# Patient Record
Sex: MALE | Race: OTHER | URBAN - METROPOLITAN AREA
[De-identification: names, ages, dates, MRNs, and addresses within clinical notes are randomized per-mention and may not be internally consistent; named-entity substitution may affect disease eponyms.]

---

## 2021-11-01 ENCOUNTER — OUTPATIENT (OUTPATIENT)
Dept: OUTPATIENT SERVICES | Facility: HOSPITAL | Age: 51
LOS: 1 days | Discharge: HOME | End: 2021-11-01

## 2021-11-01 DIAGNOSIS — Z11.59 ENCOUNTER FOR SCREENING FOR OTHER VIRAL DISEASES: ICD-10-CM

## 2024-11-15 PROBLEM — Z00.00 ENCOUNTER FOR PREVENTIVE HEALTH EXAMINATION: Status: ACTIVE | Noted: 2024-11-15

## 2024-12-18 ENCOUNTER — APPOINTMENT (OUTPATIENT)
Dept: CARDIOLOGY | Facility: CLINIC | Age: 54
End: 2024-12-18
Payer: COMMERCIAL

## 2024-12-18 ENCOUNTER — RESULT CHARGE (OUTPATIENT)
Age: 54
End: 2024-12-18

## 2024-12-18 VITALS
WEIGHT: 192 LBS | HEIGHT: 70 IN | HEART RATE: 84 BPM | SYSTOLIC BLOOD PRESSURE: 120 MMHG | DIASTOLIC BLOOD PRESSURE: 62 MMHG | BODY MASS INDEX: 27.49 KG/M2

## 2024-12-18 DIAGNOSIS — Z82.49 FAMILY HISTORY OF ISCHEMIC HEART DISEASE AND OTHER DISEASES OF THE CIRCULATORY SYSTEM: ICD-10-CM

## 2024-12-18 DIAGNOSIS — E78.1 PURE HYPERGLYCERIDEMIA: ICD-10-CM

## 2024-12-18 DIAGNOSIS — E11.9 TYPE 2 DIABETES MELLITUS W/OUT COMPLICATIONS: ICD-10-CM

## 2024-12-18 DIAGNOSIS — R06.00 DYSPNEA, UNSPECIFIED: ICD-10-CM

## 2024-12-18 DIAGNOSIS — Z86.39 PERSONAL HISTORY OF OTHER ENDOCRINE, NUTRITIONAL AND METABOLIC DISEASE: ICD-10-CM

## 2024-12-18 PROCEDURE — 93000 ELECTROCARDIOGRAM COMPLETE: CPT

## 2024-12-18 PROCEDURE — 99204 OFFICE O/P NEW MOD 45 MIN: CPT | Mod: 25

## 2024-12-18 RX ORDER — ICOSAPENT ETHYL 1 G/1
1 CAPSULE ORAL
Refills: 0 | Status: ACTIVE | COMMUNITY

## 2024-12-18 RX ORDER — UBIDECARENONE/VIT E ACET 100MG-5
CAPSULE ORAL
Refills: 0 | Status: ACTIVE | COMMUNITY

## 2024-12-18 RX ORDER — LOSARTAN POTASSIUM 25 MG/1
25 TABLET, FILM COATED ORAL DAILY
Refills: 0 | Status: ACTIVE | COMMUNITY

## 2024-12-18 RX ORDER — METFORMIN HYDROCHLORIDE 500 MG/1
500 TABLET, COATED ORAL DAILY
Refills: 0 | Status: ACTIVE | COMMUNITY

## 2024-12-18 RX ORDER — SEMAGLUTIDE 0.68 MG/ML
2 INJECTION, SOLUTION SUBCUTANEOUS
Refills: 0 | Status: ACTIVE | COMMUNITY

## 2024-12-18 RX ORDER — METOPROLOL TARTRATE 100 MG/1
100 TABLET ORAL
Qty: 2 | Refills: 0 | Status: ACTIVE | COMMUNITY
Start: 2024-12-18 | End: 1900-01-01

## 2024-12-18 RX ORDER — ATORVASTATIN CALCIUM 40 MG/1
40 TABLET, FILM COATED ORAL DAILY
Qty: 90 | Refills: 3 | Status: ACTIVE | COMMUNITY

## 2024-12-20 PROBLEM — E11.9 NON-INSULIN DEPENDENT TYPE 2 DIABETES MELLITUS: Status: ACTIVE | Noted: 2024-12-18

## 2024-12-20 PROBLEM — E78.1 HYPERTRIGLYCERIDEMIA: Status: ACTIVE | Noted: 2024-12-18

## 2024-12-20 PROBLEM — Z86.39 H/O MIXED HYPERLIPIDEMIA: Status: ACTIVE | Noted: 2024-12-18

## 2024-12-31 ENCOUNTER — APPOINTMENT (OUTPATIENT)
Dept: CARDIOLOGY | Facility: CLINIC | Age: 54
End: 2024-12-31
Payer: COMMERCIAL

## 2024-12-31 PROCEDURE — 93306 TTE W/DOPPLER COMPLETE: CPT

## 2025-01-15 ENCOUNTER — OUTPATIENT (OUTPATIENT)
Dept: OUTPATIENT SERVICES | Facility: HOSPITAL | Age: 55
LOS: 1 days | End: 2025-01-15
Payer: COMMERCIAL

## 2025-01-15 ENCOUNTER — RESULT REVIEW (OUTPATIENT)
Age: 55
End: 2025-01-15

## 2025-01-15 DIAGNOSIS — R06.00 DYSPNEA, UNSPECIFIED: ICD-10-CM

## 2025-01-15 DIAGNOSIS — Z00.8 ENCOUNTER FOR OTHER GENERAL EXAMINATION: ICD-10-CM

## 2025-01-15 PROCEDURE — 75574 CT ANGIO HRT W/3D IMAGE: CPT | Mod: 26

## 2025-01-15 PROCEDURE — 75574 CT ANGIO HRT W/3D IMAGE: CPT

## 2025-01-16 DIAGNOSIS — R06.00 DYSPNEA, UNSPECIFIED: ICD-10-CM

## 2025-01-17 ENCOUNTER — RESULT CHARGE (OUTPATIENT)
Age: 55
End: 2025-01-17

## 2025-01-17 ENCOUNTER — APPOINTMENT (OUTPATIENT)
Dept: CARDIOLOGY | Facility: CLINIC | Age: 55
End: 2025-01-17
Payer: COMMERCIAL

## 2025-01-17 ENCOUNTER — NON-APPOINTMENT (OUTPATIENT)
Age: 55
End: 2025-01-17

## 2025-01-17 VITALS
BODY MASS INDEX: 27.49 KG/M2 | DIASTOLIC BLOOD PRESSURE: 78 MMHG | HEART RATE: 99 BPM | WEIGHT: 192 LBS | HEIGHT: 70 IN | SYSTOLIC BLOOD PRESSURE: 114 MMHG

## 2025-01-17 VITALS — SYSTOLIC BLOOD PRESSURE: 114 MMHG | DIASTOLIC BLOOD PRESSURE: 78 MMHG

## 2025-01-17 DIAGNOSIS — I25.118 ATHEROSCLEROTIC HEART DISEASE OF NATIVE CORONARY ARTERY WITH OTHER FORMS OF ANGINA PECTORIS: ICD-10-CM

## 2025-01-17 DIAGNOSIS — R06.00 DYSPNEA, UNSPECIFIED: ICD-10-CM

## 2025-01-17 DIAGNOSIS — E11.9 TYPE 2 DIABETES MELLITUS W/OUT COMPLICATIONS: ICD-10-CM

## 2025-01-17 DIAGNOSIS — Z82.49 FAMILY HISTORY OF ISCHEMIC HEART DISEASE AND OTHER DISEASES OF THE CIRCULATORY SYSTEM: ICD-10-CM

## 2025-01-17 DIAGNOSIS — E78.1 PURE HYPERGLYCERIDEMIA: ICD-10-CM

## 2025-01-17 DIAGNOSIS — Z86.39 PERSONAL HISTORY OF OTHER ENDOCRINE, NUTRITIONAL AND METABOLIC DISEASE: ICD-10-CM

## 2025-01-17 LAB
ANION GAP SERPL CALC-SCNC: 14 MMOL/L
BUN SERPL-MCNC: 11 MG/DL
CALCIUM SERPL-MCNC: 10.2 MG/DL
CHLORIDE SERPL-SCNC: 98 MMOL/L
CHOLEST SERPL-MCNC: 265 MG/DL
CO2 SERPL-SCNC: 28 MMOL/L
CREAT SERPL-MCNC: 0.9 MG/DL
EGFR: 102 ML/MIN/1.73M2
ESTIMATED AVERAGE GLUCOSE: 157 MG/DL
GLUCOSE SERPL-MCNC: 135 MG/DL
HBA1C MFR BLD HPLC: 7.1 %
HCT VFR BLD CALC: 43.7 %
HDLC SERPL-MCNC: 35 MG/DL
HGB BLD-MCNC: 14.5 G/DL
INR PPP: 1.01 RATIO
LDLC SERPL CALC-MCNC: 182 MG/DL
MCHC RBC-ENTMCNC: 25.3 PG
MCHC RBC-ENTMCNC: 33.2 G/DL
MCV RBC AUTO: 76.3 FL
NONHDLC SERPL-MCNC: 230 MG/DL
PLATELET # BLD AUTO: 382 K/UL
PMV BLD AUTO: 0 /100 WBCS
PMV BLD: 8.7 FL
POTASSIUM SERPL-SCNC: 4.5 MMOL/L
PT BLD: 11.9 SEC
RBC # BLD: 5.73 M/UL
RBC # FLD: 14 %
SODIUM SERPL-SCNC: 140 MMOL/L
TRIGL SERPL-MCNC: 251 MG/DL
WBC # FLD AUTO: 6.98 K/UL

## 2025-01-17 PROCEDURE — 93000 ELECTROCARDIOGRAM COMPLETE: CPT

## 2025-01-17 PROCEDURE — 99215 OFFICE O/P EST HI 40 MIN: CPT | Mod: 25

## 2025-01-17 RX ORDER — ASPIRIN 81 MG/1
81 TABLET, COATED ORAL
Qty: 90 | Refills: 3 | Status: ACTIVE | COMMUNITY
Start: 2025-01-17 | End: 1900-01-01

## 2025-01-17 RX ORDER — METOPROLOL TARTRATE 25 MG/1
25 TABLET ORAL TWICE DAILY
Qty: 60 | Refills: 3 | Status: ACTIVE | COMMUNITY
Start: 2025-01-17 | End: 1900-01-01

## 2025-01-18 LAB
APO B SERPL-MCNC: 154 MG/DL
CRP SERPL HS-MCNC: 9.53 MG/L
NT-PROBNP SERPL-MCNC: <36 PG/ML

## 2025-01-20 LAB — APO LP(A) SERPL-MCNC: 58.8 NMOL/L

## 2025-01-21 ENCOUNTER — RESULT REVIEW (OUTPATIENT)
Age: 55
End: 2025-01-21

## 2025-01-21 ENCOUNTER — OUTPATIENT (OUTPATIENT)
Dept: OUTPATIENT SERVICES | Facility: HOSPITAL | Age: 55
LOS: 1 days | End: 2025-01-21
Payer: COMMERCIAL

## 2025-01-21 DIAGNOSIS — R93.1 ABNORMAL FINDINGS ON DIAGNOSTIC IMAGING OF HEART AND CORONARY CIRCULATION: ICD-10-CM

## 2025-01-21 PROCEDURE — 75580 N-INVAS EST C FFR SW ALY CTA: CPT

## 2025-01-21 PROCEDURE — 75580 N-INVAS EST C FFR SW ALY CTA: CPT | Mod: 26

## 2025-01-22 DIAGNOSIS — R93.1 ABNORMAL FINDINGS ON DIAGNOSTIC IMAGING OF HEART AND CORONARY CIRCULATION: ICD-10-CM

## 2025-02-04 VITALS
RESPIRATION RATE: 19 BRPM | DIASTOLIC BLOOD PRESSURE: 80 MMHG | OXYGEN SATURATION: 100 % | SYSTOLIC BLOOD PRESSURE: 119 MMHG | HEART RATE: 82 BPM

## 2025-02-04 NOTE — H&P CARDIOLOGY - HISTORY OF PRESENT ILLNESS
Patient is a 54y Male PMH of HLD, NIDDM, hypertriglyceridemia, + family h/o CAD. Pt reports shortness of breathanddyspnea during exertion, but no chest discomfort, no extremity edema, no intermittent leg claudication, no palpitations, no orthopnea, no PND and no syncope.  CCTA 1/15 showed severe stenosis of LAD and RCA. Further FFR analysis was significant for LAD 0.56 and RCA 0.76. Patient presents today for [facility] with possible intervention.     CCTA 01/15/2025   IMPRESSION:   -The quality of this study was somewhat limited by cardiac motion artifact.  -Severe stenosis in the proximal LAD due to high-risk mixed plaque.  -Severe stenosis in the mid RCA due to non-calcified plaque.  -The total Agatston coronary artery calcium score equals 61    CT FFR  FINDINGS:  Left Main: FFR CT extending to the distal segment is 0.99 with no evidence of hemodynamically significant lesion.  LAD: FFR CT extending to the distal segment is 0.56 which is consistent with a hemodynamically significant lesion.  LCX:FFR CT extending to the distal segment is 0.87 with no evidence of hemodynamically significant lesion.  RCA:FFR CT extending to the distal segment is 0.76 which is consistent with a hemodynamically significant lesion.      Pre cath note:  indication:  [ ] STEMI                [ ] NSTEMI                 [ ] Acute coronary syndrome                   [ ]Unstable Angina   [ ] high risk  [ ] intermediate risk  [ ] low risk                   [ ] Stable Angina     non-invasive testing:         cta     Date:       1/2025      result: [ ] high risk  [ x] intermediate risk  [ ] low risk    Anti- Anginal medications:                    [ ] not used d/t                     [ ] used   ( ) BB     ( ) CCB      ( ) Nitrate   (  ) Ranexa          [ ] not used but strong indication not to use    Ejection Fraction                   [ ] <29            [ ] 30-39%   [ ] 40-49%     [ ]>50%    CHF          [ ] active (within last 14 days on meds   [ ] Chronic (on meds but no exacerbation)  NYHA Functional Class:  (  ) Class I (no limitations)  (  ) Class II (slight limitation)  (  ) Class III (marked limitation)  (  ) Class IV (symptoms at rest)    COPD                   [ ] mild (on chronic bronchodilators)  [ ] moderate (on chronic steroid therapy)      [ ] severe (indication for home O2 or PACO2 >50)    Other risk factors:                     [ ] Previous MI                     [ ] CVA/ stroke                    [ ] carotid stent/ CEA                    [ ] PVD/PAD- (arterial aneurysm, non-palpable pulses, tortuous vessel with inability to insert catheter, infra-renal dissection, renal or subclavian artery stenosis)                    [ ] previous CABG                    [ ] Renal Failure:  on HD  (  ) yes  (  ) no                    [ xDiabetic  (  ) Type 1  ( x ) Type 2                                         (  ) Insulin dependent  (  ) non-insulin dependent                                         ( x ) Metformin  (  ) Januvia  (  ) Glimepiride  (  ) Glipizide  (  ) Glyburide  (  ) Actos                                         ( x ) GLP-1 receptor agonists (Ozempic, Mounjaro, Wegovy, trulicity, Byetta, Victoza)                                         (  ) SGLT2 Inhibitors (Farxiga, Jardiance, Invokana)                                         (  ) Other                Bleeding Risk: 0.7%    Pre-cath Hydration: (  )  cc IV bolus x 1 over 1 hr followed by:  (  ) NS @ 75cc/hr until procedure (up to 2 hrs) if EF> 50%                                                                                                                         (  ) NS @ 50cc/hr until procedure (up to 2 hrs) if EF< 50%                                      (  ) No precath hydration d/t    RIGHT RADIAL ARTERY EVALUATION:  MIKE TEST: [] Negative          [] Positive    EF: 50%  Date: 12/2/24    EKG:   Date: Patient is a 54y Male PMH of HLD, NIDDM, hypertriglyceridemia, + family h/o CAD. Pt reports shortness of breathanddyspnea during exertion, but no chest discomfort, no extremity edema, no intermittent leg claudication, no palpitations, no orthopnea, no PND and no syncope.  CCTA 1/15 showed severe stenosis of LAD and RCA. Further FFR analysis was significant for LAD 0.56 and RCA 0.76. Patient presents today for [facility] with possible intervention.     CCTA 01/15/2025   IMPRESSION:   -The quality of this study was somewhat limited by cardiac motion artifact.  -Severe stenosis in the proximal LAD due to high-risk mixed plaque.  -Severe stenosis in the mid RCA due to non-calcified plaque.  -The total Agatston coronary artery calcium score equals 61    CT FFR  FINDINGS:  Left Main: FFR CT extending to the distal segment is 0.99 with no evidence of hemodynamically significant lesion.  LAD: FFR CT extending to the distal segment is 0.56 which is consistent with a hemodynamically significant lesion.  LCX:FFR CT extending to the distal segment is 0.87 with no evidence of hemodynamically significant lesion.  RCA:FFR CT extending to the distal segment is 0.76 which is consistent with a hemodynamically significant lesion.      Pre cath note:  indication:  [ ] STEMI                [ ] NSTEMI                 [ ] Acute coronary syndrome                   [ ]Unstable Angina   [ ] high risk  [ ] intermediate risk  [ ] low risk                   [x ] Stable Angina     non-invasive testing:         cta     Date:       1/2025      result: [ ] high risk  [ x] intermediate risk  [ ] low risk    Anti- Anginal medications:                    [x ] not used d/t    soft bp                  [ x] used   (x ) BB     ( ) CCB      ( ) Nitrate   (  ) Ranexa          [ ] not used but strong indication not to use    Ejection Fraction                   [ ] <29            [ ] 30-39%   [ ] 40-49%     [x ]>50%    CHF          [ ] active (within last 14 days on meds   [ ] Chronic (on meds but no exacerbation)  NYHA Functional Class:  (  ) Class I (no limitations)  (  ) Class II (slight limitation)  (  ) Class III (marked limitation)  (  ) Class IV (symptoms at rest)    COPD                   [ ] mild (on chronic bronchodilators)  [ ] moderate (on chronic steroid therapy)      [ ] severe (indication for home O2 or PACO2 >50)    Other risk factors:                     [ ] Previous MI                     [ ] CVA/ stroke                    [ ] carotid stent/ CEA                    [ ] PVD/PAD- (arterial aneurysm, non-palpable pulses, tortuous vessel with inability to insert catheter, infra-renal dissection, renal or subclavian artery stenosis)                    [ ] previous CABG                    [ ] Renal Failure:  on HD  (  ) yes  (  ) no                    [ xDiabetic  (  ) Type 1  ( x ) Type 2                                         (  ) Insulin dependent  (  ) non-insulin dependent                                         ( x ) Metformin  (  ) Januvia  (  ) Glimepiride  (  ) Glipizide  (  ) Glyburide  (  ) Actos                                         ( x ) GLP-1 receptor agonists (Ozempic, Mounjaro, Wegovy, trulicity, Byetta, Victoza)                                         (  ) SGLT2 Inhibitors (Farxiga, Jardiance, Invokana)                                         (  ) Other                Bleeding Risk: 0.7%    Pre-cath Hydration: ( x )  cc IV bolus x 1 over 1 hr followed by:  (x  ) NS @ 75cc/hr until procedure (up to 2 hrs) if EF> 50%                                                                                                                         (  ) NS @ 50cc/hr until procedure (up to 2 hrs) if EF< 50%                                      (  ) No precath hydration d/t    RIGHT RADIAL ARTERY EVALUATION:  MIKE TEST: [] Negative          [x] Positive    EF: 50%  Date: 12/2/24    EKG:   Date: 1/17/25 sr

## 2025-02-04 NOTE — H&P CARDIOLOGY - NSICDXPASTMEDICALHX_GEN_ALL_CORE_FT
PAST MEDICAL HISTORY:  DM (diabetes mellitus)     HLD (hyperlipidemia)     Hypertriglyceridemia

## 2025-02-07 ENCOUNTER — OUTPATIENT (OUTPATIENT)
Dept: OUTPATIENT SERVICES | Facility: HOSPITAL | Age: 55
LOS: 1 days | Discharge: HOME | End: 2025-02-07
Payer: COMMERCIAL

## 2025-02-07 ENCOUNTER — RESULT REVIEW (OUTPATIENT)
Age: 55
End: 2025-02-07

## 2025-02-07 ENCOUNTER — TRANSCRIPTION ENCOUNTER (OUTPATIENT)
Age: 55
End: 2025-02-07

## 2025-02-07 VITALS
SYSTOLIC BLOOD PRESSURE: 119 MMHG | DIASTOLIC BLOOD PRESSURE: 64 MMHG | RESPIRATION RATE: 16 BRPM | OXYGEN SATURATION: 100 % | HEART RATE: 80 BPM

## 2025-02-07 DIAGNOSIS — I25.10 ATHEROSCLEROTIC HEART DISEASE OF NATIVE CORONARY ARTERY WITHOUT ANGINA PECTORIS: ICD-10-CM

## 2025-02-07 LAB
ANION GAP SERPL CALC-SCNC: 10 MMOL/L — SIGNIFICANT CHANGE UP (ref 7–14)
BUN SERPL-MCNC: 10 MG/DL — SIGNIFICANT CHANGE UP (ref 10–20)
CALCIUM SERPL-MCNC: 9.3 MG/DL — SIGNIFICANT CHANGE UP (ref 8.4–10.5)
CHLORIDE SERPL-SCNC: 98 MMOL/L — SIGNIFICANT CHANGE UP (ref 98–110)
CO2 SERPL-SCNC: 29 MMOL/L — SIGNIFICANT CHANGE UP (ref 17–32)
CREAT SERPL-MCNC: 0.9 MG/DL — SIGNIFICANT CHANGE UP (ref 0.7–1.5)
EGFR: 102 ML/MIN/1.73M2 — SIGNIFICANT CHANGE UP
GLUCOSE BLDC GLUCOMTR-MCNC: 135 MG/DL — HIGH (ref 70–99)
GLUCOSE SERPL-MCNC: 122 MG/DL — HIGH (ref 70–99)
HCT VFR BLD CALC: 38.3 % — LOW (ref 42–52)
HGB BLD-MCNC: 13 G/DL — LOW (ref 14–18)
MCHC RBC-ENTMCNC: 25.2 PG — LOW (ref 27–31)
MCHC RBC-ENTMCNC: 33.9 G/DL — SIGNIFICANT CHANGE UP (ref 32–37)
MCV RBC AUTO: 74.2 FL — LOW (ref 80–94)
NRBC # BLD: 0 /100 WBCS — SIGNIFICANT CHANGE UP (ref 0–0)
NRBC BLD-RTO: 0 /100 WBCS — SIGNIFICANT CHANGE UP (ref 0–0)
PLATELET # BLD AUTO: 297 K/UL — SIGNIFICANT CHANGE UP (ref 130–400)
PMV BLD: 8.9 FL — SIGNIFICANT CHANGE UP (ref 7.4–10.4)
POTASSIUM SERPL-MCNC: 4.8 MMOL/L — SIGNIFICANT CHANGE UP (ref 3.5–5)
POTASSIUM SERPL-SCNC: 4.8 MMOL/L — SIGNIFICANT CHANGE UP (ref 3.5–5)
RBC # BLD: 5.16 M/UL — SIGNIFICANT CHANGE UP (ref 4.7–6.1)
RBC # FLD: 13.6 % — SIGNIFICANT CHANGE UP (ref 11.5–14.5)
SODIUM SERPL-SCNC: 137 MMOL/L — SIGNIFICANT CHANGE UP (ref 135–146)
WBC # BLD: 6.9 K/UL — SIGNIFICANT CHANGE UP (ref 4.8–10.8)
WBC # FLD AUTO: 6.9 K/UL — SIGNIFICANT CHANGE UP (ref 4.8–10.8)

## 2025-02-07 PROCEDURE — C1887: CPT

## 2025-02-07 PROCEDURE — 71250 CT THORAX DX C-: CPT | Mod: 26

## 2025-02-07 PROCEDURE — 80048 BASIC METABOLIC PNL TOTAL CA: CPT

## 2025-02-07 PROCEDURE — 71250 CT THORAX DX C-: CPT | Mod: MC

## 2025-02-07 PROCEDURE — 71045 X-RAY EXAM CHEST 1 VIEW: CPT

## 2025-02-07 PROCEDURE — 93306 TTE W/DOPPLER COMPLETE: CPT

## 2025-02-07 PROCEDURE — 93458 L HRT ARTERY/VENTRICLE ANGIO: CPT

## 2025-02-07 PROCEDURE — 93306 TTE W/DOPPLER COMPLETE: CPT | Mod: 26

## 2025-02-07 PROCEDURE — 82962 GLUCOSE BLOOD TEST: CPT

## 2025-02-07 PROCEDURE — 71045 X-RAY EXAM CHEST 1 VIEW: CPT | Mod: 26

## 2025-02-07 PROCEDURE — 85027 COMPLETE CBC AUTOMATED: CPT

## 2025-02-07 PROCEDURE — C1894: CPT

## 2025-02-07 PROCEDURE — 36415 COLL VENOUS BLD VENIPUNCTURE: CPT

## 2025-02-07 PROCEDURE — 93880 EXTRACRANIAL BILAT STUDY: CPT

## 2025-02-07 PROCEDURE — C1769: CPT

## 2025-02-07 PROCEDURE — 93458 L HRT ARTERY/VENTRICLE ANGIO: CPT | Mod: 26

## 2025-02-07 PROCEDURE — 93880 EXTRACRANIAL BILAT STUDY: CPT | Mod: 26

## 2025-02-07 RX ORDER — ACETAMINOPHEN 160 MG/5ML
650 SUSPENSION ORAL ONCE
Refills: 0 | Status: COMPLETED | OUTPATIENT
Start: 2025-02-07 | End: 2025-02-07

## 2025-02-07 RX ORDER — BACTERIOSTATIC SODIUM CHLORIDE 0.9 %
1000 VIAL (ML) INJECTION
Refills: 0 | Status: DISCONTINUED | OUTPATIENT
Start: 2025-02-07 | End: 2025-02-07

## 2025-02-07 RX ORDER — ASPIRIN 81 MG/1
81 TABLET, COATED ORAL ONCE
Refills: 0 | Status: COMPLETED | OUTPATIENT
Start: 2025-02-07 | End: 2025-02-07

## 2025-02-07 RX ORDER — ASPIRIN 81 MG/1
0 TABLET, COATED ORAL
Refills: 0 | DISCHARGE

## 2025-02-07 RX ORDER — ATORVASTATIN CALCIUM 80 MG/1
1 TABLET, FILM COATED ORAL
Refills: 0 | DISCHARGE

## 2025-02-07 RX ORDER — METFORMIN HYDROCHLORIDE 1000 MG/1
1 TABLET, COATED ORAL
Refills: 0 | DISCHARGE

## 2025-02-07 RX ORDER — BACTERIOSTATIC SODIUM CHLORIDE 0.9 %
250 VIAL (ML) INJECTION ONCE
Refills: 0 | Status: COMPLETED | OUTPATIENT
Start: 2025-02-07 | End: 2025-02-07

## 2025-02-07 RX ORDER — SEMAGLUTIDE 0.25 MG/.5ML
0 INJECTION, SOLUTION SUBCUTANEOUS
Refills: 0 | DISCHARGE

## 2025-02-07 RX ORDER — LOSARTAN POTASSIUM 100 MG
1 TABLET ORAL
Refills: 0 | DISCHARGE

## 2025-02-07 RX ORDER — ICOSAPENT ETHYL 0.5 G/1
2 CAPSULE ORAL
Refills: 0 | DISCHARGE

## 2025-02-07 RX ORDER — ROSUVASTATIN CALCIUM 10 MG/1
80 TABLET, FILM COATED ORAL
Refills: 0 | DISCHARGE

## 2025-02-07 RX ORDER — METOPROLOL SUCCINATE 25 MG
1 TABLET, EXTENDED RELEASE 24 HR ORAL
Refills: 0 | DISCHARGE

## 2025-02-07 RX ADMIN — ASPIRIN 81 MILLIGRAM(S): 81 TABLET, COATED ORAL at 10:46

## 2025-02-07 RX ADMIN — Medication 500 MILLILITER(S): at 10:46

## 2025-02-07 RX ADMIN — ACETAMINOPHEN 650 MILLIGRAM(S): 160 SUSPENSION ORAL at 17:11

## 2025-02-07 RX ADMIN — Medication 100 MILLILITER(S): at 15:01

## 2025-02-07 NOTE — ASU DISCHARGE PLAN (ADULT/PEDIATRIC) - NS MD DC FALL RISK RISK
For information on Fall & Injury Prevention, visit: [email or web address] OR  [email or web address] OR  https://www.cdc.gov/steadi/patient.html

## 2025-02-07 NOTE — ASU DISCHARGE PLAN (ADULT/PEDIATRIC) - PROVIDER TOKENS
PROVIDER:[TOKEN:[[id number]],FOLLOWUP:[2 weeks]],PROVIDER:[TOKEN:[[id number]],SCHEDULEDAPPT:[02/13/2025],SCHEDULEDAPPTTIME:[11:15 AM]]

## 2025-02-07 NOTE — ASU DISCHARGE PLAN (ADULT/PEDIATRIC) - ASU DC SPECIAL INSTRUCTIONSFT
Activity:  - Do not drive or operate heavy machinery for 24 hours.  - Limit your physical or any strenuous activity for 2 weeks after angioplasty and 48 hours for angiogram. Support the groin site with your hand when you sneeze or cough. No heavy lifting ( objects more then 10 pounds).  - For wrist access, avoid using affected arm for 24 hours after removal of dressing and avoid heavy lifting for 7 days.  Hygiene:  - After 24 hours, you may shower and remove the dressing from the site. Do not tub bathe for one week. Do not rub or apply lotion, cream, powder to the affected site. Leave it open to air.   Diet:   - You may resume your diet. Low Sodium. Low Fat, Low Cholesterol.  If Diabetic - Carbohydrate Consistent Diet.      - Drink extra fluid unless otherwise advised.   Special Instructions:  - Bruising or black and blue at the puncture site is possible.  - If there is bleeding from the puncture site (groin or wrist) apply direct firm pressure on the site and call 911.  - Any sudden swelling, redness, fever, discharge or severe pain, call your physician or call the cath lab.   - If you notice any scab formation in the area avoid touching the site and allow it to heal.  - Numbness or "pins and needle" sensation in the affected arm, hand, leg or if the affected site become cool to touch or pale that persist for extended period of time call your physician immediately to be checked.  - If you developed chest pain, not relieved by your usual routine medication, fainting, lethargy, weakness, report to the nearest emergency room.   - Inform your Dentist or Surgeon if you are taking Aspirin or any antiplatelet medications. Report any bleeding in your urine or stool.   Medications:  - Soreness or tenderness at the site is possible it will diminish over time. You may take Tylenol every 4-6 hours as needed. Nothing stronger is needed.  - If you are diabetic and taking medication containing Metformin, do not take them for 48 hours after the procedure.     Any questions call Cardiac Cath Lab at [phone] or [phone], Monday - Friday from 7 - 9 pm.

## 2025-02-07 NOTE — ASU DISCHARGE PLAN (ADULT/PEDIATRIC) - FINANCIAL ASSISTANCE
[facility] provides services at a reduced cost to those who are determined to be eligible through [facility]’s financial assistance program. Information regarding [facility]’s financial assistance program can be found by going to [email or web address] or by calling [phone].

## 2025-02-07 NOTE — CHART NOTE - NSCHARTNOTEFT_GEN_A_CORE
PROCEDURE:   - Left heart cath       PHYSICIAN: Dr. Kothari  FELLOW: Dr. José Toledo    Pre-procedure Diagnosis: +CCTA    Consent: Patient   Anesthesia: Sedation | Local     ACCESS & CLOSURE:  6 Fr R Radial Artery -> TR Band    IV Contrast: 60 mL      Intervention:   None    Implants:   None    AUC: 7     FINDINGS:     Coronary Dominance: Right    LM: Mild disease    LAD: 80% lesion in pLAD, 80% lesion in mLAD and diffuse disease in dLAD  D1: mild disease     CX: 70% lesion at the level of bifurcation of pLcx and OM1 ([place] code 1,1,1)    RCA:  at the level of dRCA with L-R collaterals to PDA supplied by Lcx and LAD septals  RPDA: retrograde filling from L-R collaterals    LVEDP: 7 mmHg     EF:  50 % by Echo       SYNTAX Score: 49     ESTIMATED BLOOD LOSS: < 10 mL      CONDITION: Good   SPECIMEN REMOVED: N/A     POST-OP DIAGNOSIS:    3- Vessel Coronary Artery Disease:  LAD: 80% lesion in pLAD, 80% lesion in mLAD and diffuse disease in dLAD  RCA:  at the level of dRCA with L-R collaterals to PDA supplied by Lcx and LAD septals  CX: 70% lesion at the level of bifurcation of pLcx and OM1 ([place] code 1,1,1)    SYNTAX score: 49     PLAN OF CARE:   [X] D/C Home Today   [X] CT Surgery evaluation (saw patient in cath lab): patient will follow up as OP  [X] Medications:   - Continue ASA 81 mg PO QD  - Atorvastatin 80 mg QD  [X] LVEDP guided IV Fluids: NS @ 100cc/h until discharge   [X] Remove TR band. Hold manual pressure if signs of hematoma or bleeding over radial access site. PROCEDURE:   - Left heart cath       PHYSICIAN: Dr. Kothari  FELLOW: Dr. José Toledo    Pre-procedure Diagnosis: +CCTA    Consent: Patient   Anesthesia: Sedation | Local     ACCESS & CLOSURE:  6 Fr R Radial Artery -> TR Band    IV Contrast: 60 mL      Intervention:   None    Implants:   None    AUC: 7     FINDINGS:     Coronary Dominance: Right    LM: Mild disease    LAD: 80% lesion in pLAD, 90% lesion in mLAD and diffuse disease in dLAD  D1: mild disease     CX: 70-80% lesion at the level of bifurcation of pLcx and OM1 ([place]air code 1,1,1)    RCA:  at the level of dRCA with L-R collaterals to PDA supplied by Lcx and LAD septals  RPDA: retrograde filling from L-R collaterals    LVEDP: 7 mmHg     EF:  50 % by Echo       SYNTAX Score: 49     ESTIMATED BLOOD LOSS: < 10 mL      CONDITION: Good   SPECIMEN REMOVED: N/A     POST-OP DIAGNOSIS:    3- Vessel Coronary Artery Disease:  LAD: 80% lesion in pLAD, 80% lesion in mLAD and diffuse disease in dLAD  RCA:  at the level of dRCA with L-R collaterals to PDA supplied by Lcx and LAD septals  CX: 70% lesion at the level of bifurcation of pLcx and OM1 ([place] code 1,1,1)    SYNTAX score: 49     PLAN OF CARE:   [X] D/C Home Today   [X] CT Surgery evaluation (saw patient in cath lab): patient will follow up as OP  [X] Medications:   - Continue ASA 81 mg PO QD  - Atorvastatin 80 mg QD  [X] LVEDP guided IV Fluids: NS @ 100cc/h until discharge   [X] Remove TR band. Hold manual pressure if signs of hematoma or bleeding over radial access site.

## 2025-02-07 NOTE — ASU DISCHARGE PLAN (ADULT/PEDIATRIC) - CARE PROVIDER_API CALL
Brenton Sánchez  Cardiovascular Disease  [address] [place] [address], [address]  [place] [address]  Phone: [phone]  Fax: [phone]  Follow Up Time: 2 weeks    Gabriel Babcock  Thoracic and Cardiac Surgery  [address] [place] [address], [address]  [place] [address]  Phone: [phone]  Fax: [phone]  Scheduled Appointment: 02/13/2025 11:15 AM

## 2025-02-07 NOTE — CHART NOTE - NSCHARTNOTEFT_GEN_A_CORE
CT Surgery was called for an evaluation of myocardial revascularization via bypass grafts. Patient is to be discharged per Interventional Cardiology, will follow-up with CT Surgeon Dr. Babcock on 2/13 @ 11:15am

## 2025-02-08 PROBLEM — E11.9 TYPE 2 DIABETES MELLITUS WITHOUT COMPLICATIONS: Chronic | Status: ACTIVE | Noted: 2025-02-04

## 2025-02-08 PROBLEM — E78.5 HYPERLIPIDEMIA, UNSPECIFIED: Chronic | Status: ACTIVE | Noted: 2025-02-04

## 2025-02-08 PROBLEM — E78.1 PURE HYPERGLYCERIDEMIA: Chronic | Status: ACTIVE | Noted: 2025-02-04

## 2025-02-11 DIAGNOSIS — I25.10 ATHEROSCLEROTIC HEART DISEASE OF NATIVE CORONARY ARTERY WITHOUT ANGINA PECTORIS: ICD-10-CM

## 2025-02-11 DIAGNOSIS — E78.5 HYPERLIPIDEMIA, UNSPECIFIED: ICD-10-CM

## 2025-02-11 DIAGNOSIS — I10 ESSENTIAL (PRIMARY) HYPERTENSION: ICD-10-CM

## 2025-02-13 ENCOUNTER — APPOINTMENT (OUTPATIENT)
Dept: CARDIOTHORACIC SURGERY | Facility: CLINIC | Age: 55
End: 2025-02-13

## 2025-02-13 VITALS
HEART RATE: 76 BPM | WEIGHT: 185 LBS | DIASTOLIC BLOOD PRESSURE: 77 MMHG | RESPIRATION RATE: 16 BRPM | TEMPERATURE: 98.6 F | SYSTOLIC BLOOD PRESSURE: 116 MMHG | BODY MASS INDEX: 26.48 KG/M2 | HEIGHT: 70 IN | OXYGEN SATURATION: 98 %

## 2025-02-13 DIAGNOSIS — I25.118 ATHEROSCLEROTIC HEART DISEASE OF NATIVE CORONARY ARTERY WITH OTHER FORMS OF ANGINA PECTORIS: ICD-10-CM

## 2025-02-13 PROCEDURE — 99204 OFFICE O/P NEW MOD 45 MIN: CPT

## 2025-02-19 ENCOUNTER — APPOINTMENT (OUTPATIENT)
Dept: CARDIOLOGY | Facility: CLINIC | Age: 55
End: 2025-02-19

## 2025-02-26 ENCOUNTER — OUTPATIENT (OUTPATIENT)
Dept: OUTPATIENT SERVICES | Facility: HOSPITAL | Age: 55
LOS: 1 days | End: 2025-02-26
Payer: COMMERCIAL

## 2025-02-26 ENCOUNTER — APPOINTMENT (OUTPATIENT)
Dept: PULMONOLOGY | Facility: HOSPITAL | Age: 55
End: 2025-02-26
Payer: COMMERCIAL

## 2025-02-26 DIAGNOSIS — R06.02 SHORTNESS OF BREATH: ICD-10-CM

## 2025-02-26 PROCEDURE — 94060 EVALUATION OF WHEEZING: CPT | Mod: 26

## 2025-02-26 PROCEDURE — 94729 DIFFUSING CAPACITY: CPT | Mod: 26

## 2025-02-26 PROCEDURE — 94729 DIFFUSING CAPACITY: CPT

## 2025-02-26 PROCEDURE — 94726 PLETHYSMOGRAPHY LUNG VOLUMES: CPT

## 2025-02-26 PROCEDURE — 94070 EVALUATION OF WHEEZING: CPT

## 2025-02-26 PROCEDURE — 94727 GAS DIL/WSHOT DETER LNG VOL: CPT | Mod: 26

## 2025-02-26 PROCEDURE — 94664 DEMO&/EVAL PT USE INHALER: CPT

## 2025-02-27 DIAGNOSIS — R06.02 SHORTNESS OF BREATH: ICD-10-CM

## 2025-02-28 ENCOUNTER — NON-APPOINTMENT (OUTPATIENT)
Age: 55
End: 2025-02-28

## 2025-02-28 ENCOUNTER — RESULT REVIEW (OUTPATIENT)
Age: 55
End: 2025-02-28

## 2025-02-28 ENCOUNTER — OUTPATIENT (OUTPATIENT)
Dept: OUTPATIENT SERVICES | Facility: HOSPITAL | Age: 55
LOS: 1 days | End: 2025-02-28
Payer: COMMERCIAL

## 2025-02-28 VITALS
HEART RATE: 74 BPM | WEIGHT: 184.97 LBS | HEIGHT: 70 IN | SYSTOLIC BLOOD PRESSURE: 135 MMHG | OXYGEN SATURATION: 100 % | RESPIRATION RATE: 18 BRPM | DIASTOLIC BLOOD PRESSURE: 88 MMHG | TEMPERATURE: 98 F

## 2025-02-28 DIAGNOSIS — I25.10 ATHEROSCLEROTIC HEART DISEASE OF NATIVE CORONARY ARTERY WITHOUT ANGINA PECTORIS: ICD-10-CM

## 2025-02-28 DIAGNOSIS — Z01.818 ENCOUNTER FOR OTHER PREPROCEDURAL EXAMINATION: ICD-10-CM

## 2025-02-28 LAB
A1C WITH ESTIMATED AVERAGE GLUCOSE RESULT: 6.6 % — HIGH (ref 4–5.6)
ALBUMIN SERPL ELPH-MCNC: 4.6 G/DL — SIGNIFICANT CHANGE UP (ref 3.5–5.2)
ALP SERPL-CCNC: 94 U/L — SIGNIFICANT CHANGE UP (ref 30–115)
ALT FLD-CCNC: 17 U/L — SIGNIFICANT CHANGE UP (ref 0–41)
ANION GAP SERPL CALC-SCNC: 13 MMOL/L — SIGNIFICANT CHANGE UP (ref 7–14)
APPEARANCE UR: CLEAR — SIGNIFICANT CHANGE UP
APTT BLD: 37.7 SEC — SIGNIFICANT CHANGE UP (ref 27–39.2)
AST SERPL-CCNC: 14 U/L — SIGNIFICANT CHANGE UP (ref 0–41)
BASOPHILS # BLD AUTO: 0.02 K/UL — SIGNIFICANT CHANGE UP (ref 0–0.2)
BASOPHILS NFR BLD AUTO: 0.3 % — SIGNIFICANT CHANGE UP (ref 0–1)
BILIRUB SERPL-MCNC: 0.4 MG/DL — SIGNIFICANT CHANGE UP (ref 0.2–1.2)
BILIRUB UR-MCNC: NEGATIVE — SIGNIFICANT CHANGE UP
BUN SERPL-MCNC: 11 MG/DL — SIGNIFICANT CHANGE UP (ref 10–20)
CALCIUM SERPL-MCNC: 9.9 MG/DL — SIGNIFICANT CHANGE UP (ref 8.4–10.5)
CHLORIDE SERPL-SCNC: 98 MMOL/L — SIGNIFICANT CHANGE UP (ref 98–110)
CO2 SERPL-SCNC: 27 MMOL/L — SIGNIFICANT CHANGE UP (ref 17–32)
COLOR SPEC: YELLOW — SIGNIFICANT CHANGE UP
CREAT SERPL-MCNC: 0.9 MG/DL — SIGNIFICANT CHANGE UP (ref 0.7–1.5)
DIFF PNL FLD: NEGATIVE — SIGNIFICANT CHANGE UP
EGFR: 102 ML/MIN/1.73M2 — SIGNIFICANT CHANGE UP
EOSINOPHIL # BLD AUTO: 0.2 K/UL — SIGNIFICANT CHANGE UP (ref 0–0.7)
EOSINOPHIL NFR BLD AUTO: 3.2 % — SIGNIFICANT CHANGE UP (ref 0–8)
ESTIMATED AVERAGE GLUCOSE: 143 MG/DL — HIGH (ref 68–114)
GLUCOSE SERPL-MCNC: 222 MG/DL — HIGH (ref 70–99)
GLUCOSE UR QL: NEGATIVE MG/DL — SIGNIFICANT CHANGE UP
HCT VFR BLD CALC: 38.9 % — LOW (ref 42–52)
HGB BLD-MCNC: 13.3 G/DL — LOW (ref 14–18)
IMM GRANULOCYTES NFR BLD AUTO: 0.2 % — SIGNIFICANT CHANGE UP (ref 0.1–0.3)
INR BLD: 0.89 RATIO — SIGNIFICANT CHANGE UP (ref 0.65–1.3)
KETONES UR-MCNC: ABNORMAL MG/DL
LEUKOCYTE ESTERASE UR-ACNC: NEGATIVE — SIGNIFICANT CHANGE UP
LYMPHOCYTES # BLD AUTO: 1.67 K/UL — SIGNIFICANT CHANGE UP (ref 1.2–3.4)
LYMPHOCYTES # BLD AUTO: 27.1 % — SIGNIFICANT CHANGE UP (ref 20.5–51.1)
MCHC RBC-ENTMCNC: 25.6 PG — LOW (ref 27–31)
MCHC RBC-ENTMCNC: 34.2 G/DL — SIGNIFICANT CHANGE UP (ref 32–37)
MCV RBC AUTO: 75 FL — LOW (ref 80–94)
MONOCYTES # BLD AUTO: 0.4 K/UL — SIGNIFICANT CHANGE UP (ref 0.1–0.6)
MONOCYTES NFR BLD AUTO: 6.5 % — SIGNIFICANT CHANGE UP (ref 1.7–9.3)
MRSA PCR RESULT.: NEGATIVE — SIGNIFICANT CHANGE UP
NEUTROPHILS # BLD AUTO: 3.86 K/UL — SIGNIFICANT CHANGE UP (ref 1.4–6.5)
NEUTROPHILS NFR BLD AUTO: 62.7 % — SIGNIFICANT CHANGE UP (ref 42.2–75.2)
NITRITE UR-MCNC: NEGATIVE — SIGNIFICANT CHANGE UP
NRBC BLD AUTO-RTO: 0 /100 WBCS — SIGNIFICANT CHANGE UP (ref 0–0)
PH UR: 5.5 — SIGNIFICANT CHANGE UP (ref 5–8)
PLATELET # BLD AUTO: 306 K/UL — SIGNIFICANT CHANGE UP (ref 130–400)
PMV BLD: 9.6 FL — SIGNIFICANT CHANGE UP (ref 7.4–10.4)
POTASSIUM SERPL-MCNC: 4.6 MMOL/L — SIGNIFICANT CHANGE UP (ref 3.5–5)
POTASSIUM SERPL-SCNC: 4.6 MMOL/L — SIGNIFICANT CHANGE UP (ref 3.5–5)
PROT SERPL-MCNC: 8.1 G/DL — HIGH (ref 6–8)
PROT UR-MCNC: NEGATIVE MG/DL — SIGNIFICANT CHANGE UP
PROTHROM AB SERPL-ACNC: 10.5 SEC — SIGNIFICANT CHANGE UP (ref 9.95–12.87)
RBC # BLD: 5.19 M/UL — SIGNIFICANT CHANGE UP (ref 4.7–6.1)
RBC # FLD: 14.2 % — SIGNIFICANT CHANGE UP (ref 11.5–14.5)
SODIUM SERPL-SCNC: 138 MMOL/L — SIGNIFICANT CHANGE UP (ref 135–146)
SP GR SPEC: 1.02 — SIGNIFICANT CHANGE UP (ref 1–1.03)
UROBILINOGEN FLD QL: 0.2 MG/DL — SIGNIFICANT CHANGE UP (ref 0.2–1)
WBC # BLD: 6.16 K/UL — SIGNIFICANT CHANGE UP (ref 4.8–10.8)
WBC # FLD AUTO: 6.16 K/UL — SIGNIFICANT CHANGE UP (ref 4.8–10.8)

## 2025-02-28 PROCEDURE — 87086 URINE CULTURE/COLONY COUNT: CPT

## 2025-02-28 PROCEDURE — 85025 COMPLETE CBC W/AUTO DIFF WBC: CPT

## 2025-02-28 PROCEDURE — 36415 COLL VENOUS BLD VENIPUNCTURE: CPT

## 2025-02-28 PROCEDURE — 80053 COMPREHEN METABOLIC PANEL: CPT

## 2025-02-28 PROCEDURE — 86901 BLOOD TYPING SEROLOGIC RH(D): CPT

## 2025-02-28 PROCEDURE — 99214 OFFICE O/P EST MOD 30 MIN: CPT | Mod: 25

## 2025-02-28 PROCEDURE — 93005 ELECTROCARDIOGRAM TRACING: CPT

## 2025-02-28 PROCEDURE — 87641 MR-STAPH DNA AMP PROBE: CPT

## 2025-02-28 PROCEDURE — 86850 RBC ANTIBODY SCREEN: CPT

## 2025-02-28 PROCEDURE — 71046 X-RAY EXAM CHEST 2 VIEWS: CPT

## 2025-02-28 PROCEDURE — 86900 BLOOD TYPING SEROLOGIC ABO: CPT

## 2025-02-28 PROCEDURE — 87640 STAPH A DNA AMP PROBE: CPT

## 2025-02-28 PROCEDURE — 81003 URINALYSIS AUTO W/O SCOPE: CPT

## 2025-02-28 PROCEDURE — 85610 PROTHROMBIN TIME: CPT

## 2025-02-28 PROCEDURE — 85730 THROMBOPLASTIN TIME PARTIAL: CPT

## 2025-02-28 PROCEDURE — 93010 ELECTROCARDIOGRAM REPORT: CPT

## 2025-02-28 PROCEDURE — 71046 X-RAY EXAM CHEST 2 VIEWS: CPT | Mod: 26

## 2025-02-28 PROCEDURE — 83036 HEMOGLOBIN GLYCOSYLATED A1C: CPT

## 2025-02-28 NOTE — H&P PST ADULT - NSICDXPASTMEDICALHX_GEN_ALL_CORE_FT
PAST MEDICAL HISTORY:  CAD (coronary artery disease)     DM (diabetes mellitus)     HLD (hyperlipidemia)     HTN (hypertension)     Hypertriglyceridemia

## 2025-02-28 NOTE — H&P PST ADULT - HISTORY OF PRESENT ILLNESS
PATIENT/GUARDIAN CURRENTLY DENIES CHEST PAIN  SHORTNESS OF BREATH  PALPITATIONS,  DYSURIA, OR UPPER RESPIRATORY INFECTION IN PAST 2 WEEKS  denies travel outside the [place] in the past 30 days    Anesthesia Alert  NO--Difficult Airway; class III  NO--History of neck surgery or radiation  NO--Limited ROM of neck  NO--History of Malignant hyperthermia  NO--No personal or family history of Pseudocholinesterase deficiency.  NO--Prior Anesthesia Complication  NO--Latex Allergy  NO--Loose teeth  NO--History of Rheumatoid Arthritis  NO--Bleeding risk  NO--BLANCA  NO--Other_____    PT/GUARDIAN DENIES ANY RASHES, ABRASION, OR OPEN WOUNDS OR CUTS    AS PER THE PT/GUARDIAN, THIS IS HIS/HER COMPLETE MEDICAL AND SURGICAL HX, INCLUDING MEDICATIONS PRESCRIBED AND OVER THE COUNTER    Patient/guardian understands the instructions and was given the opportunity to ask questions and have them answered.      Duke Activity Status Index (DASI)Duke Activity Status Index (DASI) from [employer]  on 2/28/2025  ** All calculations should be rechecked by clinician prior to use **    RESULT SUMMARY:  58.2 points  The higher the score (maximum 58.2), the higher the functional status.    9.89 METs        INPUTS:  Take care of self —> 2.75 = Yes  Walk indoors —> 1.75 = Yes  Walk 1&ndash;2 blocks on level ground —> 2.75 = Yes  Climb a flight of stairs or walk up a hill —> 5.5 = Yes  Run a short distance —> 8 = Yes  Do light work around the house —> 2.7 = Yes  Do moderate work around the house —> 3.5 = Yes  Do heavy work around the house —> 8 = Yes  Do yardwork —> 4.5 = Yes  Have sexual relations —> 5.25 = Yes  Participate in moderate recreational activities —> 6 = Yes  Participate in strenuous sports —> 7.5 = Yes        Revised Cardiac Risk Index for Pre-Operative Risk  Revised Cardiac Risk Index for Pre-Operative Risk from [employer]  on 2/28/2025  ** All calculations should be rechecked by clinician prior to use **    RESULT SUMMARY:  1 points  RCRI Score    6.0 %  Risk of major cardiac event      INPUTS:  High-risk surgery —> 0 = No  History of ischemic heart disease —> 1 = Yes  History of congestive heart failure —> 0 = No  History of cerebrovascular disease —> 0 = No  Pre-operative treatment with insulin —> 0 = No  Pre-operative creatinine >2 mg/dL / 176.8 µmol/L —> 0 = No      Frailty score reviewed

## 2025-03-01 DIAGNOSIS — Z01.818 ENCOUNTER FOR OTHER PREPROCEDURAL EXAMINATION: ICD-10-CM

## 2025-03-01 DIAGNOSIS — I25.10 ATHEROSCLEROTIC HEART DISEASE OF NATIVE CORONARY ARTERY WITHOUT ANGINA PECTORIS: ICD-10-CM

## 2025-03-01 LAB
CULTURE RESULTS: SIGNIFICANT CHANGE UP
SPECIMEN SOURCE: SIGNIFICANT CHANGE UP

## 2025-03-04 VITALS
WEIGHT: 185.19 LBS | HEART RATE: 66 BPM | HEIGHT: 70.08 IN | DIASTOLIC BLOOD PRESSURE: 73 MMHG | RESPIRATION RATE: 20 BRPM | OXYGEN SATURATION: 100 % | TEMPERATURE: 98 F | SYSTOLIC BLOOD PRESSURE: 120 MMHG

## 2025-03-04 NOTE — PRE-OP CHECKLIST - SELECT TESTS ORDERED
Left a message for the patient to get his labs drawn prior to his appointment on 6-26-18.  He will need a PSA blood test. The orders are in the Epic system.    Results in MD note CONFIRMATION TUBE SENT/Results in MD note

## 2025-03-05 ENCOUNTER — APPOINTMENT (OUTPATIENT)
Dept: CARDIOTHORACIC SURGERY | Facility: HOSPITAL | Age: 55
End: 2025-03-05

## 2025-03-05 ENCOUNTER — INPATIENT (INPATIENT)
Facility: HOSPITAL | Age: 55
LOS: 8 days | Discharge: HOME HEALTH | DRG: 166 | End: 2025-03-14
Attending: THORACIC SURGERY (CARDIOTHORACIC VASCULAR SURGERY) | Admitting: THORACIC SURGERY (CARDIOTHORACIC VASCULAR SURGERY)
Payer: COMMERCIAL

## 2025-03-05 ENCOUNTER — TRANSCRIPTION ENCOUNTER (OUTPATIENT)
Age: 55
End: 2025-03-05

## 2025-03-05 VITALS
HEIGHT: 70 IN | DIASTOLIC BLOOD PRESSURE: 88 MMHG | RESPIRATION RATE: 18 BRPM | OXYGEN SATURATION: 100 % | WEIGHT: 184.97 LBS | TEMPERATURE: 98 F | SYSTOLIC BLOOD PRESSURE: 135 MMHG | HEART RATE: 74 BPM

## 2025-03-05 DIAGNOSIS — I25.10 ATHEROSCLEROTIC HEART DISEASE OF NATIVE CORONARY ARTERY WITHOUT ANGINA PECTORIS: ICD-10-CM

## 2025-03-05 LAB
ABO RH CONFIRMATION: SIGNIFICANT CHANGE UP
ALBUMIN SERPL ELPH-MCNC: 3.9 G/DL — SIGNIFICANT CHANGE UP (ref 3.5–5.2)
ALBUMIN SERPL ELPH-MCNC: 4.1 G/DL — SIGNIFICANT CHANGE UP (ref 3.5–5.2)
ALP SERPL-CCNC: 44 U/L — SIGNIFICANT CHANGE UP (ref 30–115)
ALP SERPL-CCNC: 50 U/L — SIGNIFICANT CHANGE UP (ref 30–115)
ALT FLD-CCNC: 11 U/L — SIGNIFICANT CHANGE UP (ref 0–41)
ALT FLD-CCNC: 13 U/L — SIGNIFICANT CHANGE UP (ref 0–41)
ANION GAP SERPL CALC-SCNC: 7 MMOL/L — SIGNIFICANT CHANGE UP (ref 7–14)
ANION GAP SERPL CALC-SCNC: 9 MMOL/L — SIGNIFICANT CHANGE UP (ref 7–14)
APTT BLD: 20.4 SEC — CRITICAL LOW (ref 27–39.2)
APTT BLD: 30.7 SEC — SIGNIFICANT CHANGE UP (ref 27–39.2)
APTT BLD: 37.9 SEC — SIGNIFICANT CHANGE UP (ref 27–39.2)
AST SERPL-CCNC: 24 U/L — SIGNIFICANT CHANGE UP (ref 0–41)
AST SERPL-CCNC: 29 U/L — SIGNIFICANT CHANGE UP (ref 0–41)
BASOPHILS # BLD AUTO: 0.02 K/UL — SIGNIFICANT CHANGE UP (ref 0–0.2)
BASOPHILS NFR BLD AUTO: 0.1 % — SIGNIFICANT CHANGE UP (ref 0–1)
BILIRUB SERPL-MCNC: 0.7 MG/DL — SIGNIFICANT CHANGE UP (ref 0.2–1.2)
BILIRUB SERPL-MCNC: 0.8 MG/DL — SIGNIFICANT CHANGE UP (ref 0.2–1.2)
BUN SERPL-MCNC: 10 MG/DL — SIGNIFICANT CHANGE UP (ref 10–20)
BUN SERPL-MCNC: 10 MG/DL — SIGNIFICANT CHANGE UP (ref 10–20)
CALCIUM SERPL-MCNC: 7.8 MG/DL — LOW (ref 8.4–10.5)
CALCIUM SERPL-MCNC: 8 MG/DL — LOW (ref 8.4–10.5)
CHLORIDE SERPL-SCNC: 107 MMOL/L — SIGNIFICANT CHANGE UP (ref 98–110)
CHLORIDE SERPL-SCNC: 110 MMOL/L — SIGNIFICANT CHANGE UP (ref 98–110)
CO2 SERPL-SCNC: 23 MMOL/L — SIGNIFICANT CHANGE UP (ref 17–32)
CO2 SERPL-SCNC: 24 MMOL/L — SIGNIFICANT CHANGE UP (ref 17–32)
CREAT SERPL-MCNC: 0.7 MG/DL — SIGNIFICANT CHANGE UP (ref 0.7–1.5)
CREAT SERPL-MCNC: 0.8 MG/DL — SIGNIFICANT CHANGE UP (ref 0.7–1.5)
EGFR: 106 ML/MIN/1.73M2 — SIGNIFICANT CHANGE UP
EGFR: 106 ML/MIN/1.73M2 — SIGNIFICANT CHANGE UP
EGFR: 110 ML/MIN/1.73M2 — SIGNIFICANT CHANGE UP
EGFR: 110 ML/MIN/1.73M2 — SIGNIFICANT CHANGE UP
EOSINOPHIL # BLD AUTO: 0.17 K/UL — SIGNIFICANT CHANGE UP (ref 0–0.7)
EOSINOPHIL NFR BLD AUTO: 1 % — SIGNIFICANT CHANGE UP (ref 0–8)
FIBRINOGEN PPP-MCNC: 212 MG/DL — SIGNIFICANT CHANGE UP (ref 200–435)
FIBRINOGEN PPP-MCNC: 225 MG/DL — SIGNIFICANT CHANGE UP (ref 200–435)
GAS PNL BLDA: SIGNIFICANT CHANGE UP
GLUCOSE BLDC GLUCOMTR-MCNC: 115 MG/DL — HIGH (ref 70–99)
GLUCOSE BLDC GLUCOMTR-MCNC: 126 MG/DL — HIGH (ref 70–99)
GLUCOSE BLDC GLUCOMTR-MCNC: 129 MG/DL — HIGH (ref 70–99)
GLUCOSE BLDC GLUCOMTR-MCNC: 131 MG/DL — HIGH (ref 70–99)
GLUCOSE BLDC GLUCOMTR-MCNC: 153 MG/DL — HIGH (ref 70–99)
GLUCOSE BLDC GLUCOMTR-MCNC: 86 MG/DL — SIGNIFICANT CHANGE UP (ref 70–99)
GLUCOSE SERPL-MCNC: 118 MG/DL — HIGH (ref 70–99)
GLUCOSE SERPL-MCNC: 80 MG/DL — SIGNIFICANT CHANGE UP (ref 70–99)
HCT VFR BLD CALC: 22.5 % — LOW (ref 42–52)
HCT VFR BLD CALC: 23.1 % — LOW (ref 42–52)
HCT VFR BLD CALC: 25.9 % — LOW (ref 42–52)
HGB BLD-MCNC: 7.7 G/DL — LOW (ref 14–18)
HGB BLD-MCNC: 8.1 G/DL — LOW (ref 14–18)
HGB BLD-MCNC: 9.1 G/DL — LOW (ref 14–18)
IMM GRANULOCYTES NFR BLD AUTO: 0.7 % — HIGH (ref 0.1–0.3)
INR BLD: 1.15 RATIO — SIGNIFICANT CHANGE UP (ref 0.65–1.3)
INR BLD: 1.17 RATIO — SIGNIFICANT CHANGE UP (ref 0.65–1.3)
INR BLD: 1.24 RATIO — SIGNIFICANT CHANGE UP (ref 0.65–1.3)
LYMPHOCYTES # BLD AUTO: 16.3 % — LOW (ref 20.5–51.1)
LYMPHOCYTES # BLD AUTO: 2.85 K/UL — SIGNIFICANT CHANGE UP (ref 1.2–3.4)
MAGNESIUM SERPL-MCNC: 1.9 MG/DL — SIGNIFICANT CHANGE UP (ref 1.8–2.4)
MAGNESIUM SERPL-MCNC: 2.5 MG/DL — HIGH (ref 1.8–2.4)
MCHC RBC-ENTMCNC: 25.7 PG — LOW (ref 27–31)
MCHC RBC-ENTMCNC: 25.8 PG — LOW (ref 27–31)
MCHC RBC-ENTMCNC: 26 PG — LOW (ref 27–31)
MCHC RBC-ENTMCNC: 34.2 G/DL — SIGNIFICANT CHANGE UP (ref 32–37)
MCHC RBC-ENTMCNC: 35.1 G/DL — SIGNIFICANT CHANGE UP (ref 32–37)
MCHC RBC-ENTMCNC: 35.1 G/DL — SIGNIFICANT CHANGE UP (ref 32–37)
MCV RBC AUTO: 73.2 FL — LOW (ref 80–94)
MCV RBC AUTO: 74.3 FL — LOW (ref 80–94)
MCV RBC AUTO: 75.3 FL — LOW (ref 80–94)
MONOCYTES # BLD AUTO: 0.45 K/UL — SIGNIFICANT CHANGE UP (ref 0.1–0.6)
MONOCYTES NFR BLD AUTO: 2.6 % — SIGNIFICANT CHANGE UP (ref 1.7–9.3)
NEUTROPHILS # BLD AUTO: 13.85 K/UL — HIGH (ref 1.4–6.5)
NEUTROPHILS NFR BLD AUTO: 79.3 % — HIGH (ref 42.2–75.2)
NRBC BLD AUTO-RTO: 0 /100 WBCS — SIGNIFICANT CHANGE UP (ref 0–0)
PLATELET # BLD AUTO: 179 K/UL — SIGNIFICANT CHANGE UP (ref 130–400)
PLATELET # BLD AUTO: 214 K/UL — SIGNIFICANT CHANGE UP (ref 130–400)
PLATELET # BLD AUTO: 273 K/UL — SIGNIFICANT CHANGE UP (ref 130–400)
PMV BLD: 9.1 FL — SIGNIFICANT CHANGE UP (ref 7.4–10.4)
PMV BLD: 9.3 FL — SIGNIFICANT CHANGE UP (ref 7.4–10.4)
PMV BLD: 9.4 FL — SIGNIFICANT CHANGE UP (ref 7.4–10.4)
POTASSIUM SERPL-MCNC: 3.8 MMOL/L — SIGNIFICANT CHANGE UP (ref 3.5–5)
POTASSIUM SERPL-MCNC: 4.2 MMOL/L — SIGNIFICANT CHANGE UP (ref 3.5–5)
POTASSIUM SERPL-SCNC: 3.8 MMOL/L — SIGNIFICANT CHANGE UP (ref 3.5–5)
POTASSIUM SERPL-SCNC: 4.2 MMOL/L — SIGNIFICANT CHANGE UP (ref 3.5–5)
PROT SERPL-MCNC: 5.1 G/DL — LOW (ref 6–8)
PROT SERPL-MCNC: 5.3 G/DL — LOW (ref 6–8)
PROTHROM AB SERPL-ACNC: 13.6 SEC — HIGH (ref 9.95–12.87)
PROTHROM AB SERPL-ACNC: 13.8 SEC — HIGH (ref 9.95–12.87)
PROTHROM AB SERPL-ACNC: 14.7 SEC — HIGH (ref 9.95–12.87)
RBC # BLD: 2.99 M/UL — LOW (ref 4.7–6.1)
RBC # BLD: 3.11 M/UL — LOW (ref 4.7–6.1)
RBC # BLD: 3.54 M/UL — LOW (ref 4.7–6.1)
RBC # FLD: 13.7 % — SIGNIFICANT CHANGE UP (ref 11.5–14.5)
RBC # FLD: 13.9 % — SIGNIFICANT CHANGE UP (ref 11.5–14.5)
RBC # FLD: 13.9 % — SIGNIFICANT CHANGE UP (ref 11.5–14.5)
SODIUM SERPL-SCNC: 138 MMOL/L — SIGNIFICANT CHANGE UP (ref 135–146)
SODIUM SERPL-SCNC: 142 MMOL/L — SIGNIFICANT CHANGE UP (ref 135–146)
WBC # BLD: 12.13 K/UL — HIGH (ref 4.8–10.8)
WBC # BLD: 15.1 K/UL — HIGH (ref 4.8–10.8)
WBC # BLD: 17.46 K/UL — HIGH (ref 4.8–10.8)
WBC # FLD AUTO: 12.13 K/UL — HIGH (ref 4.8–10.8)
WBC # FLD AUTO: 15.1 K/UL — HIGH (ref 4.8–10.8)
WBC # FLD AUTO: 17.46 K/UL — HIGH (ref 4.8–10.8)

## 2025-03-05 PROCEDURE — 94760 N-INVAS EAR/PLS OXIMETRY 1: CPT

## 2025-03-05 PROCEDURE — 93005 ELECTROCARDIOGRAM TRACING: CPT

## 2025-03-05 PROCEDURE — 83735 ASSAY OF MAGNESIUM: CPT

## 2025-03-05 PROCEDURE — C1889: CPT

## 2025-03-05 PROCEDURE — 84132 ASSAY OF SERUM POTASSIUM: CPT

## 2025-03-05 PROCEDURE — 85027 COMPLETE CBC AUTOMATED: CPT

## 2025-03-05 PROCEDURE — 33534 CABG ARTERIAL TWO: CPT | Mod: AS

## 2025-03-05 PROCEDURE — 80202 ASSAY OF VANCOMYCIN: CPT

## 2025-03-05 PROCEDURE — 87205 SMEAR GRAM STAIN: CPT

## 2025-03-05 PROCEDURE — 97110 THERAPEUTIC EXERCISES: CPT | Mod: GP

## 2025-03-05 PROCEDURE — 84145 PROCALCITONIN (PCT): CPT

## 2025-03-05 PROCEDURE — 86900 BLOOD TYPING SEROLOGIC ABO: CPT

## 2025-03-05 PROCEDURE — 36430 TRANSFUSION BLD/BLD COMPNT: CPT

## 2025-03-05 PROCEDURE — 85018 HEMOGLOBIN: CPT

## 2025-03-05 PROCEDURE — C1751: CPT

## 2025-03-05 PROCEDURE — P9037: CPT

## 2025-03-05 PROCEDURE — 84480 ASSAY TRIIODOTHYRONINE (T3): CPT

## 2025-03-05 PROCEDURE — 85610 PROTHROMBIN TIME: CPT

## 2025-03-05 PROCEDURE — 84436 ASSAY OF TOTAL THYROXINE: CPT

## 2025-03-05 PROCEDURE — 85014 HEMATOCRIT: CPT

## 2025-03-05 PROCEDURE — 85025 COMPLETE CBC W/AUTO DIFF WBC: CPT

## 2025-03-05 PROCEDURE — 84443 ASSAY THYROID STIM HORMONE: CPT

## 2025-03-05 PROCEDURE — 33534 CABG ARTERIAL TWO: CPT

## 2025-03-05 PROCEDURE — 82330 ASSAY OF CALCIUM: CPT

## 2025-03-05 PROCEDURE — 81003 URINALYSIS AUTO W/O SCOPE: CPT

## 2025-03-05 PROCEDURE — C1769: CPT

## 2025-03-05 PROCEDURE — 71046 X-RAY EXAM CHEST 2 VIEWS: CPT

## 2025-03-05 PROCEDURE — 93010 ELECTROCARDIOGRAM REPORT: CPT

## 2025-03-05 PROCEDURE — 80053 COMPREHEN METABOLIC PANEL: CPT

## 2025-03-05 PROCEDURE — 36415 COLL VENOUS BLD VENIPUNCTURE: CPT

## 2025-03-05 PROCEDURE — 85384 FIBRINOGEN ACTIVITY: CPT

## 2025-03-05 PROCEDURE — P9047: CPT | Mod: JZ

## 2025-03-05 PROCEDURE — 71045 X-RAY EXAM CHEST 1 VIEW: CPT | Mod: 26,76

## 2025-03-05 PROCEDURE — 33508 ENDOSCOPIC VEIN HARVEST: CPT | Mod: 59

## 2025-03-05 PROCEDURE — 84439 ASSAY OF FREE THYROXINE: CPT

## 2025-03-05 PROCEDURE — 33508 ENDOSCOPIC VEIN HARVEST: CPT | Mod: AS,59

## 2025-03-05 PROCEDURE — P9016: CPT

## 2025-03-05 PROCEDURE — 33518 CABG ARTERY-VEIN TWO: CPT

## 2025-03-05 PROCEDURE — 94640 AIRWAY INHALATION TREATMENT: CPT

## 2025-03-05 PROCEDURE — 86891 AUTOLOGOUS BLOOD OP SALVAGE: CPT

## 2025-03-05 PROCEDURE — 33518 CABG ARTERY-VEIN TWO: CPT | Mod: AS

## 2025-03-05 PROCEDURE — 86901 BLOOD TYPING SEROLOGIC RH(D): CPT

## 2025-03-05 PROCEDURE — 87040 BLOOD CULTURE FOR BACTERIA: CPT

## 2025-03-05 PROCEDURE — 87070 CULTURE OTHR SPECIMN AEROBIC: CPT

## 2025-03-05 PROCEDURE — 99291 CRITICAL CARE FIRST HOUR: CPT

## 2025-03-05 PROCEDURE — 97530 THERAPEUTIC ACTIVITIES: CPT | Mod: GP

## 2025-03-05 PROCEDURE — 86850 RBC ANTIBODY SCREEN: CPT

## 2025-03-05 PROCEDURE — 86923 COMPATIBILITY TEST ELECTRIC: CPT

## 2025-03-05 PROCEDURE — 83605 ASSAY OF LACTIC ACID: CPT

## 2025-03-05 PROCEDURE — 97116 GAIT TRAINING THERAPY: CPT | Mod: GP

## 2025-03-05 PROCEDURE — 84295 ASSAY OF SERUM SODIUM: CPT

## 2025-03-05 PROCEDURE — 71250 CT THORAX DX C-: CPT | Mod: MC

## 2025-03-05 PROCEDURE — 97162 PT EVAL MOD COMPLEX 30 MIN: CPT | Mod: GP

## 2025-03-05 PROCEDURE — P9100: CPT

## 2025-03-05 PROCEDURE — 82962 GLUCOSE BLOOD TEST: CPT

## 2025-03-05 PROCEDURE — 85730 THROMBOPLASTIN TIME PARTIAL: CPT

## 2025-03-05 PROCEDURE — 94010 BREATHING CAPACITY TEST: CPT

## 2025-03-05 PROCEDURE — 94002 VENT MGMT INPAT INIT DAY: CPT

## 2025-03-05 PROCEDURE — P9045: CPT | Mod: JZ

## 2025-03-05 PROCEDURE — 71045 X-RAY EXAM CHEST 1 VIEW: CPT

## 2025-03-05 PROCEDURE — 82803 BLOOD GASES ANY COMBINATION: CPT

## 2025-03-05 RX ORDER — FENTANYL CITRATE-0.9 % NACL/PF 100MCG/2ML
25 SYRINGE (ML) INTRAVENOUS ONCE
Refills: 0 | Status: DISCONTINUED | OUTPATIENT
Start: 2025-03-05 | End: 2025-03-05

## 2025-03-05 RX ORDER — HYDROMORPHONE/SOD CHLOR,ISO/PF 2 MG/10 ML
0.5 SYRINGE (ML) INJECTION EVERY 6 HOURS
Refills: 0 | Status: DISCONTINUED | OUTPATIENT
Start: 2025-03-05 | End: 2025-03-05

## 2025-03-05 RX ORDER — VASOPRESSIN 20 [USP'U]/ML
0.04 INJECTION INTRAVENOUS
Qty: 40 | Refills: 0 | Status: DISCONTINUED | OUTPATIENT
Start: 2025-03-05 | End: 2025-03-06

## 2025-03-05 RX ORDER — ATORVASTATIN CALCIUM 80 MG/1
1 TABLET, FILM COATED ORAL
Refills: 0 | DISCHARGE

## 2025-03-05 RX ORDER — NICARDIPINE HCL 30 MG
5 CAPSULE ORAL
Qty: 40 | Refills: 0 | Status: DISCONTINUED | OUTPATIENT
Start: 2025-03-05 | End: 2025-03-06

## 2025-03-05 RX ORDER — SENNA 187 MG
2 TABLET ORAL AT BEDTIME
Refills: 0 | Status: DISCONTINUED | OUTPATIENT
Start: 2025-03-06 | End: 2025-03-14

## 2025-03-05 RX ORDER — DEXMEDETOMIDINE HYDROCHLORIDE IN SODIUM CHLORIDE 4 UG/ML
0.02 INJECTION INTRAVENOUS
Qty: 200 | Refills: 0 | Status: DISCONTINUED | OUTPATIENT
Start: 2025-03-05 | End: 2025-03-06

## 2025-03-05 RX ORDER — DEXTROSE 50 % IN WATER 50 %
25 SYRINGE (ML) INTRAVENOUS
Refills: 0 | Status: DISCONTINUED | OUTPATIENT
Start: 2025-03-05 | End: 2025-03-14

## 2025-03-05 RX ORDER — ALBUMIN (HUMAN) 12.5 G/50ML
500 INJECTION, SOLUTION INTRAVENOUS ONCE
Refills: 0 | Status: COMPLETED | OUTPATIENT
Start: 2025-03-05 | End: 2025-03-05

## 2025-03-05 RX ORDER — NOREPINEPHRINE BITARTRATE 8 MG
0.05 SOLUTION INTRAVENOUS
Qty: 8 | Refills: 0 | Status: DISCONTINUED | OUTPATIENT
Start: 2025-03-05 | End: 2025-03-06

## 2025-03-05 RX ORDER — ASPIRIN 325 MG
300 TABLET ORAL ONCE
Refills: 0 | Status: COMPLETED | OUTPATIENT
Start: 2025-03-05 | End: 2025-03-05

## 2025-03-05 RX ORDER — ACETAMINOPHEN 500 MG/5ML
650 LIQUID (ML) ORAL EVERY 6 HOURS
Refills: 0 | Status: DISCONTINUED | OUTPATIENT
Start: 2025-03-05 | End: 2025-03-07

## 2025-03-05 RX ORDER — CEFAZOLIN SODIUM IN 0.9 % NACL 3 G/100 ML
1000 INTRAVENOUS SOLUTION, PIGGYBACK (ML) INTRAVENOUS EVERY 8 HOURS
Refills: 0 | Status: COMPLETED | OUTPATIENT
Start: 2025-03-05 | End: 2025-03-07

## 2025-03-05 RX ORDER — PROPOFOL 10 MG/ML
15 INJECTION, EMULSION INTRAVENOUS
Qty: 1000 | Refills: 0 | Status: DISCONTINUED | OUTPATIENT
Start: 2025-03-05 | End: 2025-03-06

## 2025-03-05 RX ORDER — BISACODYL 5 MG
10 TABLET, DELAYED RELEASE (ENTERIC COATED) ORAL ONCE
Refills: 0 | Status: DISCONTINUED | OUTPATIENT
Start: 2025-03-07 | End: 2025-03-14

## 2025-03-05 RX ORDER — OXYCODONE HYDROCHLORIDE 30 MG/1
10 TABLET ORAL EVERY 4 HOURS
Refills: 0 | Status: DISCONTINUED | OUTPATIENT
Start: 2025-03-05 | End: 2025-03-11

## 2025-03-05 RX ORDER — OXYCODONE HYDROCHLORIDE 30 MG/1
5 TABLET ORAL EVERY 4 HOURS
Refills: 0 | Status: DISCONTINUED | OUTPATIENT
Start: 2025-03-05 | End: 2025-03-12

## 2025-03-05 RX ORDER — DEXTROSE 50 % IN WATER 50 %
50 SYRINGE (ML) INTRAVENOUS
Refills: 0 | Status: DISCONTINUED | OUTPATIENT
Start: 2025-03-05 | End: 2025-03-14

## 2025-03-05 RX ORDER — VANCOMYCIN HCL IN 5 % DEXTROSE 1.5G/250ML
1000 PLASTIC BAG, INJECTION (ML) INTRAVENOUS EVERY 12 HOURS
Refills: 0 | Status: COMPLETED | OUTPATIENT
Start: 2025-03-05 | End: 2025-03-06

## 2025-03-05 RX ORDER — ACETAMINOPHEN 500 MG/5ML
1000 LIQUID (ML) ORAL ONCE
Refills: 0 | Status: COMPLETED | OUTPATIENT
Start: 2025-03-05 | End: 2025-03-05

## 2025-03-05 RX ORDER — NITROGLYCERIN 20 MG/G
15 OINTMENT TOPICAL
Qty: 50 | Refills: 0 | Status: DISCONTINUED | OUTPATIENT
Start: 2025-03-05 | End: 2025-03-06

## 2025-03-05 RX ORDER — POLYETHYLENE GLYCOL 3350 17 G/17G
17 POWDER, FOR SOLUTION ORAL DAILY
Refills: 0 | Status: DISCONTINUED | OUTPATIENT
Start: 2025-03-06 | End: 2025-03-14

## 2025-03-05 RX ADMIN — Medication 20 MILLIGRAM(S): at 17:08

## 2025-03-05 RX ADMIN — Medication 100 MILLIEQUIVALENT(S): at 17:08

## 2025-03-05 RX ADMIN — Medication 15 MILLILITER(S): at 17:06

## 2025-03-05 RX ADMIN — NOREPINEPHRINE BITARTRATE 7.88 MICROGRAM(S)/KG/MIN: 8 SOLUTION at 17:09

## 2025-03-05 RX ADMIN — Medication 25 MICROGRAM(S): at 21:30

## 2025-03-05 RX ADMIN — Medication 25 MICROGRAM(S): at 20:30

## 2025-03-05 RX ADMIN — ALBUMIN (HUMAN) 250 MILLILITER(S): 12.5 INJECTION, SOLUTION INTRAVENOUS at 17:06

## 2025-03-05 RX ADMIN — VASOPRESSIN 6 UNIT(S)/MIN: 20 INJECTION INTRAVENOUS at 17:09

## 2025-03-05 RX ADMIN — Medication 100 MILLIGRAM(S): at 22:23

## 2025-03-05 RX ADMIN — Medication 15 MILLILITER(S): at 17:07

## 2025-03-05 RX ADMIN — Medication 250 MILLIGRAM(S): at 17:06

## 2025-03-05 RX ADMIN — Medication 400 MILLIGRAM(S): at 20:00

## 2025-03-05 RX ADMIN — Medication 1000 MILLIGRAM(S): at 21:00

## 2025-03-05 RX ADMIN — Medication 5 UNIT(S)/HR: at 18:15

## 2025-03-05 RX ADMIN — ALBUMIN (HUMAN) 100 MILLILITER(S): 12.5 INJECTION, SOLUTION INTRAVENOUS at 18:57

## 2025-03-05 RX ADMIN — Medication 1 APPLICATION(S): at 17:08

## 2025-03-05 NOTE — DISCHARGE NOTE PROVIDER - NSDCCPTREATMENT_GEN_ALL_CORE_FT
PRINCIPAL PROCEDURE  Procedure: CABG, with CATARINA  Findings and Treatment: Activities/Restrictions  1.	Do not – drive, lift, pull or push anything over 10 pounds for 8 weeks.  2.	Shower every night and carefully wash wound, pat dry.  Cover is wound is draining with dry sterile dressing. No baths or swimming for two months.   3.	Apply support stockings /ace wraps to legs as soon as you get out of bed in the morning, remove in evening.   4.	Do progressive walking exercises every day, gradually increasing to 30 to 40 minutes/day, five days a week and incentive spirometer 10 times every hour while awake  5.	DO NOT DRIVE OR CONSUME ALCOHOL WHILE TAKING PAIN MEDICATION.  Contact your Physician promptly if:  1.	 Signs of wound infection, such as increasing redness, swelling, pain or drainage from incision.  2.	Progressive shortness of breath or increasing difficulty with breathing when lying down.  3.	Excessive nausea, vomiting, diarrhea or coughing.  4.	Increase swelling of legs that does not resolve with leg elevation.  5.	Chest pain that spreads to arms, jaw or back or sudden development of numbness, weakness, difficulty speaking or facial droop – Call 911.  6.	Diabetics who are unable to keep finger stick glucose under 150 for three consecutive readings.  Instructions:  1.	 Keep a daily log for Temperature, pulse, blood pressure, and weight twice a day and Glucose if diabetic with each meal. Call office for Temp > 101, pulse greater than 110 or less than 55, BP first # greater than 160 or less than 100, 3 pound weight gain in 1 day or 5 pounds in 3 days  2.	Hold pillow to chest and grab elbows when you need to cough.

## 2025-03-05 NOTE — DISCHARGE NOTE PROVIDER - CARE PROVIDER_API CALL
Gabriel Babcock  Thoracic and Cardiac Surgery  [address], [address]  [place] [address]  Phone: [phone]  Fax: [phone]  Follow Up Time:     Brenton Sánchez  Cardiovascular Disease  [address], [address]  [place] [address]  Phone: [phone]  Fax: [phone]  Follow Up Time:

## 2025-03-05 NOTE — BRIEF OPERATIVE NOTE - NSICDXBRIEFPROCEDURE_GEN_ALL_CORE_FT
PROCEDURES:  CABG, with CATARINA 05-Mar-2025 15:28:42 LIMA to LAd, RSVG to LPL, RPDA, LRAD to OM1 Manolo Molina

## 2025-03-05 NOTE — DISCHARGE NOTE PROVIDER - NSDCFUSCHEDAPPT_GEN_ALL_CORE_FT
Gabriel Babcock  [facility] Physician [facility]  CTSURG [address]  Scheduled Appointment: 03/20/2025    Brenton Sánchez  [facility]  CARDIOLOGY [address]  Scheduled Appointment: 04/10/2025

## 2025-03-05 NOTE — PRE-ANESTHESIA EVALUATION ADULT - NSANTHAIRWAYFT_ENT_ALL_CORE
[facility]    [address]    [facility] [address]    Phone:  [phone]    Fax:  [phone]       Thank You for choosing us for your health care visit. We are glad to serve you and happy to provide you with this summary of your visit. Please help us to ensure we have accurate records. If you find anything that needs to be changed, please let our staff know as soon as possible.          Your Demographic Information     Patient Name Sex Jack Clements Male 1984       Ethnic Group Patient Race    Not of  or  Origin White      Your Visit Details     Date & Time Provider Department    3/28/2017 8:40 AM Geronimo Vicente MD [facility]      Conditions Discussed Today or Order-Related Diagnoses        Comments    Other acute sinusitis    -  Primary       Your Vitals Were     BP Pulse Temp Smoking Status          131/91 (BP Location: [facility] [place] Patient Position: Sitting, Cuff Size: Regular) 89 97.7 °F (36.5 °C) (Tympanic) Never Smoker        Medications Prescribed or Re-Ordered Today     azithromycin (ZITHROMAX Z-SABI) 250 MG tablet    Si tablets day one, then 1 tablet days 2-5    Class: Eprescribe    Pharmacy: [facility] [address] [place] [address] AT [facility] Ph #: [phone]    methylPREDNISolone (MEDROL, SABI,) 4 MG tablet    Sig: follow package directions    Class: Eprescribe    Pharmacy: [facility] [address] [place] [address] AT [facility] Ph #: [phone]      Your Current Medications Are        Disp Refills Start End    azithromycin (ZITHROMAX Z-SABI) 250 MG tablet 6 tablet 0 3/28/2017 2017    Si tablets day one, then 1 tablet days 2-5    Class: Eprescribe    methylPREDNISolone (MEDROL, SABI,) 4 MG tablet 21 tablet 0 3/28/2017     Sig: follow package directions    Class: Eprescribe      Allergies     No Known Allergies              Patient Instructions      Sinusitis  (Antibiotic Treatment)    The sinuses are air-filled spaces within the bones of the face. They connect to the inside of the nose. Sinusitis is an inflammation of the tissue lining the sinus cavity. Sinus inflammation can occur during a cold. It can also be due to allergies to pollens and other particles in the air. Sinusitis can cause symptoms of sinus congestion and fullness. A sinus infection causes fever, headache and facial pain. There is often green or yellow drainage from the nose or into the back of the throat (post-nasal drip). You have been given antibiotics to treat this condition.  Home care:  · Take the full course of antibiotics as instructed. Do not stop taking them, even if you feel better.  · Drink plenty of water, hot tea, and other liquids. This may help thin mucus. It also may promote sinus drainage.  · Heat may help soothe painful areas of the face. Use a towel soaked in hot water. Or,  the shower and direct the hot spray onto your face. Using a vaporizer along with a menthol rub at night may also help.   · An expectorant containing guaifenesin may help thin the mucus and promote drainage from the sinuses.  · Over-the-counter decongestants may be used unless a similar medicine was prescribed. Nasal sprays work the fastest. Use one that contains phenylephrine or oxymetazoline. First blow the nose gently. Then use the spray. Do not use these medicines more often than directed on the label or symptoms may get worse. You may also use tablets containing pseudoephedrine. Avoid products that combine ingredients, because side effects may be increased. Read labels. You can also ask the pharmacist for help. (NOTE: Persons with high blood pressure should not use decongestants. They can raise blood pressure.)  · Over-the-counter antihistamines may help if allergies contributed to your sinusitis.    · Do not use nasal rinses or irrigation during an acute sinus infection, unless told to by your health  care provider. Rinsing may spread the infection to other sinuses.  · Use acetaminophen or ibuprofen to control pain, unless another pain medicine was prescribed. (If you have chronic liver or kidney disease or ever had a stomach ulcer, talk with your doctor before using these medicines. Aspirin should never be used in anyone under 18 years of age who is ill with a fever. It may cause severe liver damage.)  · Don't smoke. This can worsen symptoms.  Follow-up care  Follow up with your healthcare provider or our staff if you are not improving within the next week.  When to seek medical advice  Call your healthcare provider if any of these occur:  · Facial pain or headache becoming more severe  · Stiff neck  · Unusual drowsiness or confusion  · Swelling of the forehead or eyelids  · Vision problems, including blurred or double vision  · Fever of 100.4ºF (38ºC) or higher, or as directed by your healthcare provider  · Seizure  · Breathing problems  · Symptoms not resolving within 10 days  © 9616-8646 [employer]. [address] [place] [address], [place] [address]. All rights reserved. This information is not intended as a substitute for professional medical care. Always follow your healthcare professional's instructions.              FTOM  TMD.3FB

## 2025-03-05 NOTE — DISCHARGE NOTE PROVIDER - HOSPITAL COURSE
Mr. BYRON NAJERA is a 54 year M with PMhx  includes DLD, HTN, CAD. Mr. NAJERA was worked up for complaints SOB on exertion. He  had a cardiac catheterization which revealed multivessel disease. On 03/05/25, he underwent surgical myocardial revascularization. Post-operatively, Mr. BYRON NAJERA is a 54 year old M with PMHx  includes DLD, HTN, CAD. Mr. NAJERA was worked up for complaints SOB on exertion. He  had a cardiac catheterization which revealed multivessel disease. On 03/05/25, he underwent surgical myocardial revascularization (CABG x 4 with left radial artery harvest). Post-operatively, the patient developed a left pleural effusion and had a pigtail cath inserted by thoracic surgery for evacuation of a loculated pleural effusion.  He developed a fever and was started on broad spectrum antibiotics. Mr. BYRON NAJERA is a 54 year old M with PMHx  includes DLD, HTN, CAD. Mr. NAJERA was worked up for complaints SOB on exertion. He  had a cardiac catheterization which revealed multivessel disease. On 03/05/25, he underwent surgical myocardial revascularization (CABG x 4 with left radial artery harvest). Post-operatively, the patient developed a left pleural effusion and had a pigtail cath inserted by thoracic surgery for evacuation of a loculated pleural effusion.  He developed a fever and was started on broad spectrum antibiotics for PNA.  He was started on Flomax for urinary retention.  He was transfused 2 units RBC's for acute blood loss anemia.  He otherwise had an uneventful hospital course and was discharged home in stable condition on POD # 9.    Pt was educated on the importance of attending cardiac rehab post op and was given materials re cardiac rehab program.  He was instructed to inquire further upon seeing his cardiologist.

## 2025-03-05 NOTE — PRE-ANESTHESIA EVALUATION ADULT - LAST CARDIAC ANGIOGRAM/IMAGING
Rhofade Counseling: Rhofade is a topical medication which can decrease superficial blood flow where applied. Side effects are uncommon and include stinging, redness and allergic reactions. 2/2025

## 2025-03-05 NOTE — PROGRESS NOTE ADULT - SUBJECTIVE AND OBJECTIVE BOX
CTU Attending Progress Daily Note     05 Mar 2025 21:36  Procedure: CABG  POD#: 0    He has history of HLD (hyperlipidemia)    Hypertriglyceridemia    DM (diabetes mellitus)    CAD (coronary artery disease)    HTN (hypertension)      HPI:  Mr. BYRON NAJERA is a 54 year M with PMhx  includes DLD, HTN, CAD. Mr. NAJERA was worked up for complaints SOB on exertion. He  had a cardiac catheterization which revealed multivessel disease. On 03/05/25, he underwent surgical myocardial revascularization.    Hospital Course:  3/5 CABG 4 LIMA + Lt Radial  OR C2, Alb1, ,      on Levo and Vaso: Albumin 500 given  post-extubation hypercapnia on BIPAP  chest tube 300ml: Hb 7.7, 1 PRBCs 1 Platelets    OBJECTIVE:  ICU Vital Signs Last 24 Hrs  T(C): 37.3 (05 Mar 2025 20:00), Max: 37.3 (05 Mar 2025 19:00)  T(F): 99.1 (05 Mar 2025 20:00), Max: 99.1 (05 Mar 2025 19:00)  HR: 102 (05 Mar 2025 20:50) (72 - 102)  BP: 95/53 (05 Mar 2025 17:30) (95/53 - 135/88)  BP(mean): 72 (05 Mar 2025 17:30) (72 - 72)  ABP: 104/51 (05 Mar 2025 20:00) (91/48 - 137/61)  ABP(mean): 66 (05 Mar 2025 20:00) (59 - 83)  RR: 37 (05 Mar 2025 20:00) (13 - 37)  SpO2: 100% (05 Mar 2025 20:50) (99% - 100%)    O2 Parameters below as of 05 Mar 2025 20:00  Patient On (Oxygen Delivery Method): mask, aerosol    O2 Concentration (%): 50      I&O's Summary    05 Mar 2025 07:01  -  05 Mar 2025 21:36  --------------------------------------------------------  IN: 994.7 mL / OUT: 945 mL / NET: 49.7 mL      I&O's Detail    05 Mar 2025 07:01  -  05 Mar 2025 21:36  --------------------------------------------------------  IN:    Albumin 5%  - 500 mL: 100 mL    Dexmedetomidine: 10.5 mL    Insulin: 5 mL    IV PiggyBack: 750 mL    Norepinephrine: 25.2 mL    sodium chloride 0.9%: 80 mL    Vasopressin: 24 mL  Total IN: 994.7 mL    OUT:    Chest Tube (mL): 45 mL    Chest Tube (mL): 200 mL    Intermittent Catheterization - Urethral (mL): 700 mL  Total OUT: 945 mL    Total NET: 49.7 mL        Adult Advanced Hemodynamics Last 24 Hrs  CVP(mm Hg): 27 (05 Mar 2025 20:00) (5 - 27)  CVP(cm H2O): --  CO: 6.5 (05 Mar 2025 19:00) (5 - 6.5)  CI: 3.2 (05 Mar 2025 19:00) (2.4 - 3.2)  PA: 29/20 (05 Mar 2025 20:00) (16/9 - 29/21)  PA(mean): 25 (05 Mar 2025 20:00) (12 - 25)  PCWP: --  SVR: 590 (05 Mar 2025 19:00) (590 - 1086)  SVRI: 1198 (05 Mar 2025 19:00) (1198 - 2263)  PVR: --  PVRI: --  Mode: CPAP with PS  FiO2: 40  PEEP: 5  PS: 8    CAPILLARY BLOOD GLUCOSE      POCT Blood Glucose.: 131 mg/dL (05 Mar 2025 19:40)  POCT Blood Glucose.: 153 mg/dL (05 Mar 2025 18:34)  POCT Blood Glucose.: 115 mg/dL (05 Mar 2025 16:57)  POCT Blood Glucose.: 86 mg/dL (05 Mar 2025 15:46)    LABS:  ABG - ( 05 Mar 2025 20:30 )  pH, Arterial: 7.27  pH, Blood: x     /  pCO2: 51    /  pO2: 153   / HCO3: 23    / Base Excess: -3.5  /  SaO2: 99.0                                    7.7    15.10 )-----------( 214      ( 05 Mar 2025 21:13 )             22.5     03-05    142  |  110  |  10  ----------------------------<  80  3.8   |  23  |  0.7    Ca    8.0[L]      05 Mar 2025 16:02  Mg     2.5     03-05    TPro  5.1[L]  /  Alb  3.9  /  TBili  0.8  /  DBili  x   /  AST  24  /  ALT  11  /  AlkPhos  50  03-05    PT/INR - ( 05 Mar 2025 16:02 )   PT: 13.80 sec;   INR: 1.17 ratio         PTT - ( 05 Mar 2025 16:02 )  PTT:30.7 sec  Urinalysis Basic - ( 05 Mar 2025 16:02 )    Color: x / Appearance: x / SG: x / pH: x  Gluc: 80 mg/dL / Ketone: x  / Bili: x / Urobili: x   Blood: x / Protein: x / Nitrite: x   Leuk Esterase: x / RBC: x / WBC x   Sq Epi: x / Non Sq Epi: x / Bacteria: x        Home Medications:  aspirin 81 mg oral delayed release capsule: orally once a day (05 Mar 2025 06:53)  atorvastatin 80 mg oral tablet: 1 tab(s) orally once a day (05 Mar 2025 06:53)  icosapent 1 g oral capsule: 2 cap(s) orally once a day (05 Mar 2025 06:53)  losartan 25 mg oral tablet: 1 tab(s) orally once a day (05 Mar 2025 06:53)  metFORMIN 500 mg oral tablet: 1 tab(s) orally 2 times a day (05 Mar 2025 06:53)  metoprolol succinate 25 mg oral tablet, extended release: 1 tab(s) orally once a day (05 Mar 2025 06:53)  Ozempic 2 mg/1.5 mL (1 mg dose) subcutaneous solution: subcutaneous once a week (05 Mar 2025 06:53)    HOSPITAL MEDICATIONS:  MEDICATIONS  (STANDING):  acetaminophen     Tablet .. 650 milliGRAM(s) Oral every 6 hours  acetaminophen   IVPB .. 1000 milliGRAM(s) IV Intermittent once  aspirin Suppository 300 milliGRAM(s) Rectal once  ceFAZolin   IVPB 1000 milliGRAM(s) IV Intermittent every 8 hours  chlorhexidine 0.12% Liquid 15 milliLiter(s) Oral Mucosa every 12 hours  chlorhexidine 0.12% Liquid 15 milliLiter(s) Oral Mucosa every 12 hours  chlorhexidine 2% Cloths 1 Application(s) Topical daily  dexMEDEtomidine Infusion 0.02 MICROgram(s)/kG/Hr (0.42 mL/Hr) IV Continuous <Continuous>  dextrose 50% Injectable 50 milliLiter(s) IV Push every 15 minutes  dextrose 50% Injectable 25 milliLiter(s) IV Push every 15 minutes  famotidine Injectable 20 milliGRAM(s) IV Push every 12 hours  fentaNYL    Injectable 25 MICROGram(s) IV Push once  insulin regular Infusion 5 Unit(s)/Hr (5 mL/Hr) IV Continuous <Continuous>  meperidine     Injectable 25 milliGRAM(s) IV Push once  niCARdipine Infusion 5 mG/Hr (25 mL/Hr) IV Continuous <Continuous>  nitroglycerin  Infusion 15 MICROgram(s)/Min (4.5 mL/Hr) IV Continuous <Continuous>  norepinephrine Infusion 0.05 MICROgram(s)/kG/Min (7.88 mL/Hr) IV Continuous <Continuous>  propofol Infusion 15 MICROgram(s)/kG/Min (7.56 mL/Hr) IV Continuous <Continuous>  sodium chloride 0.9%. 1000 milliLiter(s) (20 mL/Hr) IV Continuous <Continuous>  vancomycin  IVPB 1000 milliGRAM(s) IV Intermittent every 12 hours  vasopressin Infusion 0.04 Unit(s)/Min (6 mL/Hr) IV Continuous <Continuous>    MEDICATIONS  (PRN):  HYDROmorphone  Injectable 0.5 milliGRAM(s) IV Push every 6 hours PRN Breakthrough Pain  oxyCODONE    IR 5 milliGRAM(s) Oral every 4 hours PRN Moderate Pain (4 - 6)  oxyCODONE    IR 10 milliGRAM(s) Oral every 4 hours PRN Severe Pain (7 - 10)    RADIOLOGY:  Chest X-ray Reviewed    REVIEW OF SYSTEMS:    [ x ] Unable to assess ROS, on BIPAP    PHYSICAL EXAM:          CONSTITUTIONAL: Well-developed; well-nourished; in no acute distress.   	SKIN: warm, dry  	HEAD: Normocephalic; atraumatic.  	EYES: PERRL, EOM, no conj injection, sclera clear  	ENT: No nasal discharge; airway clear.  	NECK: Supple; non tender.   	CARD: S1, S2 normal; no murmurs, gallops, or rubs. Regular rate and rhythm.  no carotid bruits  	RESP: CTA B/L; good air movement No wheezes, rales or rhonchi.  	ABD: Soft, not tender, not distended,  no rebound or guarding, bowel sounds present  	EXT: Normal ROM.  No clubbing, cyanosis or edema.   warm and well perfused.  pulses palpable  	NEURO: Alert, awake, motor 5/5 R, 5/5 L    Assessment   s/p CABG POD 0 currently on Levo and Vaso for hypotension, Acute hypercapnia on BIPAP    Plan:    Neuro:  Multimodal pain management  Tylenol, Lidoderm patch, gabapentin, opiates as needed  Monitor neuro status in the post op period    CV:  S/P CABG  Monitor perfusion, , lactate, UOP, index and MVO2   Maintain MAP > 65      Resp:  BIPAP and frequent ABGs  Supplemental oxygen to maintain sats > 92%  Continuous pulse oximetry monitoring  IS / Chest PT  Nebulizers as needed    GI:  NPO  Bowel Regimen - escalate as needed  PUD ppx per protocol    Renal  High risk for TERENCE post surgery  Monitor UOP, lytes, creatinine  Diuretics as needed for negative fluid balance  Keep K > 4, Mg > 2    Endo:  Tight glucose control per CTICU protocol  Insulin gtt as needed  Transition to Lantus / SSI and premeal insulin as needed    Heme:  Acute blood loss anemia post surgery  PRBC transfusion ongoing    ID:  Perioperative prophylactic abx per protocol  Monitor fever curve and WBC count        Upon my evaluation, the above patient has a high probability of imminent or life threatening deterioration due to a high risk for Shock, Cardiogenic shock, arrythmias, acute blood loss, acute kidney injury and acute pulmonary insufficiency which required my direct attention, intervention, and personal management.  Total time was 55 minutes which includes review of radiology results, ordering, interpreting and reviewing diagnostic studies / lab tests with immediate assessment and treatment, consultant collaboration on findings and treatment options, monitoring for potential decompensation, obtaining history from family, EMT, nursing home staff and/or treating physicians; directing the titration of pressors, oxygen support devices, fluid resuscitation, interpretation of cardiac output measurements, interpretation of radiological assessments, interpretation of EKG / rhythm strips, telemetry.  This time did not overlap with the management of other patients or performing separately reportable procedures.      Management of this patient was discussed with the CT surgery attending and mid-level providers.    I acknowledge the use of copied documentation.  All copy forward documentation is my own and has been reviewed and revised as appropriate.  If no changes were made, it is because that information remains the same.

## 2025-03-05 NOTE — DISCHARGE NOTE PROVIDER - NSDCMRMEDTOKEN_GEN_ALL_CORE_FT
aspirin 81 mg oral delayed release capsule: orally once a day  atorvastatin 80 mg oral tablet: 1 tab(s) orally once a day  icosapent 1 g oral capsule: 2 cap(s) orally once a day  losartan 25 mg oral tablet: 1 tab(s) orally once a day  metFORMIN 500 mg oral tablet: 1 tab(s) orally 2 times a day  metoprolol succinate 25 mg oral tablet, extended release: 1 tab(s) orally once a day  Ozempic 2 mg/1.5 mL (1 mg dose) subcutaneous solution: subcutaneous once a week   acetaminophen 325 mg oral tablet: 2 tab(s) orally every 6 hours As needed Mild Pain (1 - 3)  amoxicillin-clavulanate 875 mg-125 mg oral tablet: 1 tab(s) orally every 12 hours  aspirin 325 mg oral delayed release tablet: 1 tab(s) orally once a day  atorvastatin 80 mg oral tablet: 1 tab(s) orally once a day  Cardizem  mg/24 hours oral capsule, extended release: 1 cap(s) orally once a day  ferrous sulfate 325 mg (65 mg elemental iron) oral delayed release tablet: 1 tab(s) orally once a day  Flomax 0.4 mg oral capsule: 1 cap(s) orally once a day (at bedtime)  folic acid 1 mg oral tablet: 1 tab(s) orally once a day  icosapent 1 g oral capsule: 2 cap(s) orally once a day  Lasix 40 mg oral tablet: 1 tab(s) orally once a day  metFORMIN 500 mg oral tablet: 1 tab(s) orally 2 times a day  metoprolol tartrate 50 mg oral tablet: 1 tab(s) orally every 8 hours  Neurontin 300 mg oral capsule: 1 cap(s) orally every 8 hours  oxycodone-acetaminophen 5 mg-325 mg oral tablet: 1 tab(s) orally every 6 hours as needed for  moderate pain MDD: 6  Ozempic 2 mg/1.5 mL (1 mg dose) subcutaneous solution: subcutaneous once a week  pantoprazole 40 mg oral delayed release tablet: 1 tab(s) orally once a day  Potassium Chloride (Eqv-K-Tab) 20 mEq oral tablet, extended release: 1 tab(s) orally once a day  Vitamin C 500 mg oral tablet: 1 tab(s) orally once a day

## 2025-03-05 NOTE — DISCHARGE NOTE PROVIDER - NSDCFUADDAPPT_GEN_ALL_CORE_FT
please follow up with Dr. Babcock on 3/20 at 12:15pm    please follow up with cardiology in 2 weeks and with pmd in 4 weeks

## 2025-03-06 PROBLEM — I10 ESSENTIAL (PRIMARY) HYPERTENSION: Chronic | Status: ACTIVE | Noted: 2025-02-28

## 2025-03-06 PROBLEM — I25.10 ATHEROSCLEROTIC HEART DISEASE OF NATIVE CORONARY ARTERY WITHOUT ANGINA PECTORIS: Chronic | Status: ACTIVE | Noted: 2025-02-28

## 2025-03-06 LAB
ALBUMIN SERPL ELPH-MCNC: 4.4 G/DL — SIGNIFICANT CHANGE UP (ref 3.5–5.2)
ALP SERPL-CCNC: 47 U/L — SIGNIFICANT CHANGE UP (ref 30–115)
ALT FLD-CCNC: 16 U/L — SIGNIFICANT CHANGE UP (ref 0–41)
ANION GAP SERPL CALC-SCNC: 8 MMOL/L — SIGNIFICANT CHANGE UP (ref 7–14)
APTT BLD: 32.4 SEC — SIGNIFICANT CHANGE UP (ref 27–39.2)
AST SERPL-CCNC: 35 U/L — SIGNIFICANT CHANGE UP (ref 0–41)
BASE EXCESS BLDMV CALC-SCNC: -2.2 MMOL/L — SIGNIFICANT CHANGE UP
BASOPHILS # BLD AUTO: 0.01 K/UL — SIGNIFICANT CHANGE UP (ref 0–0.2)
BASOPHILS NFR BLD AUTO: 0.1 % — SIGNIFICANT CHANGE UP (ref 0–1)
BILIRUB SERPL-MCNC: 1 MG/DL — SIGNIFICANT CHANGE UP (ref 0.2–1.2)
BUN SERPL-MCNC: 12 MG/DL — SIGNIFICANT CHANGE UP (ref 10–20)
CALCIUM SERPL-MCNC: 8 MG/DL — LOW (ref 8.4–10.5)
CHLORIDE SERPL-SCNC: 104 MMOL/L — SIGNIFICANT CHANGE UP (ref 98–110)
CO2 SERPL-SCNC: 23 MMOL/L — SIGNIFICANT CHANGE UP (ref 17–32)
CREAT SERPL-MCNC: 0.9 MG/DL — SIGNIFICANT CHANGE UP (ref 0.7–1.5)
EGFR: 102 ML/MIN/1.73M2 — SIGNIFICANT CHANGE UP
EGFR: 102 ML/MIN/1.73M2 — SIGNIFICANT CHANGE UP
EOSINOPHIL # BLD AUTO: 0 K/UL — SIGNIFICANT CHANGE UP (ref 0–0.7)
EOSINOPHIL NFR BLD AUTO: 0 % — SIGNIFICANT CHANGE UP (ref 0–8)
GAS PNL BLDA: SIGNIFICANT CHANGE UP
GAS PNL BLDMV: SIGNIFICANT CHANGE UP
GLUCOSE BLDC GLUCOMTR-MCNC: 110 MG/DL — HIGH (ref 70–99)
GLUCOSE BLDC GLUCOMTR-MCNC: 118 MG/DL — HIGH (ref 70–99)
GLUCOSE BLDC GLUCOMTR-MCNC: 120 MG/DL — HIGH (ref 70–99)
GLUCOSE BLDC GLUCOMTR-MCNC: 125 MG/DL — HIGH (ref 70–99)
GLUCOSE BLDC GLUCOMTR-MCNC: 129 MG/DL — HIGH (ref 70–99)
GLUCOSE BLDC GLUCOMTR-MCNC: 131 MG/DL — HIGH (ref 70–99)
GLUCOSE BLDC GLUCOMTR-MCNC: 148 MG/DL — HIGH (ref 70–99)
GLUCOSE BLDC GLUCOMTR-MCNC: 97 MG/DL — SIGNIFICANT CHANGE UP (ref 70–99)
GLUCOSE BLDC GLUCOMTR-MCNC: 99 MG/DL — SIGNIFICANT CHANGE UP (ref 70–99)
GLUCOSE SERPL-MCNC: 112 MG/DL — HIGH (ref 70–99)
HCO3 BLDMV-SCNC: 25 MMOL/L — SIGNIFICANT CHANGE UP (ref 20–28)
HCT VFR BLD CALC: 24.3 % — LOW (ref 42–52)
HGB BLD-MCNC: 8.2 G/DL — LOW (ref 14–18)
IMM GRANULOCYTES NFR BLD AUTO: 0.1 % — SIGNIFICANT CHANGE UP (ref 0.1–0.3)
INR BLD: 1.14 RATIO — SIGNIFICANT CHANGE UP (ref 0.65–1.3)
LYMPHOCYTES # BLD AUTO: 0.59 K/UL — LOW (ref 1.2–3.4)
LYMPHOCYTES # BLD AUTO: 6.5 % — LOW (ref 20.5–51.1)
MAGNESIUM SERPL-MCNC: 1.9 MG/DL — SIGNIFICANT CHANGE UP (ref 1.8–2.4)
MCHC RBC-ENTMCNC: 26.5 PG — LOW (ref 27–31)
MCHC RBC-ENTMCNC: 33.7 G/DL — SIGNIFICANT CHANGE UP (ref 32–37)
MCV RBC AUTO: 78.4 FL — LOW (ref 80–94)
MONOCYTES # BLD AUTO: 0.39 K/UL — SIGNIFICANT CHANGE UP (ref 0.1–0.6)
MONOCYTES NFR BLD AUTO: 4.3 % — SIGNIFICANT CHANGE UP (ref 1.7–9.3)
NEUTROPHILS # BLD AUTO: 8.05 K/UL — HIGH (ref 1.4–6.5)
NEUTROPHILS NFR BLD AUTO: 89 % — HIGH (ref 42.2–75.2)
NRBC BLD AUTO-RTO: 0 /100 WBCS — SIGNIFICANT CHANGE UP (ref 0–0)
O2 CT VFR BLD CALC: 38 MMHG — SIGNIFICANT CHANGE UP (ref 30–65)
PCO2 BLDMV: 54 MMHG — SIGNIFICANT CHANGE UP (ref 30–65)
PH BLDMV: 7.28 — SIGNIFICANT CHANGE UP
PLATELET # BLD AUTO: 183 K/UL — SIGNIFICANT CHANGE UP (ref 130–400)
PMV BLD: 9.6 FL — SIGNIFICANT CHANGE UP (ref 7.4–10.4)
POTASSIUM SERPL-MCNC: 4.9 MMOL/L — SIGNIFICANT CHANGE UP (ref 3.5–5)
POTASSIUM SERPL-SCNC: 4.9 MMOL/L — SIGNIFICANT CHANGE UP (ref 3.5–5)
PROT SERPL-MCNC: 5.7 G/DL — LOW (ref 6–8)
PROTHROM AB SERPL-ACNC: 13.5 SEC — HIGH (ref 9.95–12.87)
RBC # BLD: 3.1 M/UL — LOW (ref 4.7–6.1)
RBC # FLD: 15 % — HIGH (ref 11.5–14.5)
SAO2 % BLDMV: 68.7 — SIGNIFICANT CHANGE UP (ref 60–90)
SODIUM SERPL-SCNC: 135 MMOL/L — SIGNIFICANT CHANGE UP (ref 135–146)
WBC # BLD: 9.05 K/UL — SIGNIFICANT CHANGE UP (ref 4.8–10.8)
WBC # FLD AUTO: 9.05 K/UL — SIGNIFICANT CHANGE UP (ref 4.8–10.8)

## 2025-03-06 PROCEDURE — 99291 CRITICAL CARE FIRST HOUR: CPT

## 2025-03-06 PROCEDURE — 71045 X-RAY EXAM CHEST 1 VIEW: CPT | Mod: 26

## 2025-03-06 PROCEDURE — 93010 ELECTROCARDIOGRAM REPORT: CPT

## 2025-03-06 RX ORDER — ACETAMINOPHEN 500 MG/5ML
1000 LIQUID (ML) ORAL ONCE
Refills: 0 | Status: COMPLETED | OUTPATIENT
Start: 2025-03-06 | End: 2025-03-06

## 2025-03-06 RX ORDER — METOPROLOL SUCCINATE 50 MG/1
12.5 TABLET, EXTENDED RELEASE ORAL EVERY 12 HOURS
Refills: 0 | Status: DISCONTINUED | OUTPATIENT
Start: 2025-03-06 | End: 2025-03-07

## 2025-03-06 RX ORDER — AMIODARONE HYDROCHLORIDE 50 MG/ML
1 INJECTION, SOLUTION INTRAVENOUS
Qty: 450 | Refills: 0 | Status: DISCONTINUED | OUTPATIENT
Start: 2025-03-06 | End: 2025-03-06

## 2025-03-06 RX ORDER — SODIUM BICARBONATE 1 MEQ/ML
50 SYRINGE (ML) INTRAVENOUS ONCE
Refills: 0 | Status: COMPLETED | OUTPATIENT
Start: 2025-03-06 | End: 2025-03-06

## 2025-03-06 RX ORDER — ATORVASTATIN CALCIUM 80 MG/1
80 TABLET, FILM COATED ORAL AT BEDTIME
Refills: 0 | Status: DISCONTINUED | OUTPATIENT
Start: 2025-03-06 | End: 2025-03-14

## 2025-03-06 RX ORDER — ACETAMINOPHEN 500 MG/5ML
1000 LIQUID (ML) ORAL ONCE
Refills: 0 | Status: COMPLETED | OUTPATIENT
Start: 2025-03-07 | End: 2025-03-07

## 2025-03-06 RX ORDER — ASPIRIN 325 MG
81 TABLET ORAL DAILY
Refills: 0 | Status: DISCONTINUED | OUTPATIENT
Start: 2025-03-06 | End: 2025-03-10

## 2025-03-06 RX ORDER — FENTANYL CITRATE-0.9 % NACL/PF 100MCG/2ML
25 SYRINGE (ML) INTRAVENOUS ONCE
Refills: 0 | Status: DISCONTINUED | OUTPATIENT
Start: 2025-03-06 | End: 2025-03-06

## 2025-03-06 RX ORDER — ONDANSETRON HCL/PF 4 MG/2 ML
4 VIAL (ML) INJECTION EVERY 4 HOURS
Refills: 0 | Status: DISCONTINUED | OUTPATIENT
Start: 2025-03-06 | End: 2025-03-06

## 2025-03-06 RX ORDER — MELATONIN 5 MG
5 TABLET ORAL AT BEDTIME
Refills: 0 | Status: DISCONTINUED | OUTPATIENT
Start: 2025-03-06 | End: 2025-03-14

## 2025-03-06 RX ORDER — ONDANSETRON HCL/PF 4 MG/2 ML
4 VIAL (ML) INJECTION EVERY 4 HOURS
Refills: 0 | Status: COMPLETED | OUTPATIENT
Start: 2025-03-06 | End: 2025-03-07

## 2025-03-06 RX ORDER — HEPARIN SODIUM 1000 [USP'U]/ML
5000 INJECTION INTRAVENOUS; SUBCUTANEOUS EVERY 8 HOURS
Refills: 0 | Status: DISCONTINUED | OUTPATIENT
Start: 2025-03-06 | End: 2025-03-14

## 2025-03-06 RX ADMIN — ATORVASTATIN CALCIUM 80 MILLIGRAM(S): 80 TABLET, FILM COATED ORAL at 22:49

## 2025-03-06 RX ADMIN — OXYCODONE HYDROCHLORIDE 5 MILLIGRAM(S): 30 TABLET ORAL at 18:16

## 2025-03-06 RX ADMIN — AMIODARONE HYDROCHLORIDE 33.3 MG/MIN: 50 INJECTION, SOLUTION INTRAVENOUS at 01:57

## 2025-03-06 RX ADMIN — Medication 1 APPLICATION(S): at 11:57

## 2025-03-06 RX ADMIN — METOPROLOL SUCCINATE 12.5 MILLIGRAM(S): 50 TABLET, EXTENDED RELEASE ORAL at 22:49

## 2025-03-06 RX ADMIN — METOPROLOL SUCCINATE 12.5 MILLIGRAM(S): 50 TABLET, EXTENDED RELEASE ORAL at 12:38

## 2025-03-06 RX ADMIN — Medication 100 MILLIGRAM(S): at 05:24

## 2025-03-06 RX ADMIN — Medication 100 MILLIGRAM(S): at 14:58

## 2025-03-06 RX ADMIN — Medication 81 MILLIGRAM(S): at 12:38

## 2025-03-06 RX ADMIN — Medication 1000 MILLIGRAM(S): at 10:00

## 2025-03-06 RX ADMIN — Medication 50 MILLIEQUIVALENT(S): at 02:44

## 2025-03-06 RX ADMIN — Medication 40 MILLIGRAM(S): at 12:38

## 2025-03-06 RX ADMIN — Medication 1000 MILLIGRAM(S): at 04:00

## 2025-03-06 RX ADMIN — Medication 250 MILLIGRAM(S): at 17:22

## 2025-03-06 RX ADMIN — Medication 20 MILLIGRAM(S): at 05:25

## 2025-03-06 RX ADMIN — Medication 1000 MILLIGRAM(S): at 20:50

## 2025-03-06 RX ADMIN — Medication 5 MILLIGRAM(S): at 22:51

## 2025-03-06 RX ADMIN — OXYCODONE HYDROCHLORIDE 10 MILLIGRAM(S): 30 TABLET ORAL at 22:52

## 2025-03-06 RX ADMIN — OXYCODONE HYDROCHLORIDE 5 MILLIGRAM(S): 30 TABLET ORAL at 13:08

## 2025-03-06 RX ADMIN — Medication 400 MILLIGRAM(S): at 15:55

## 2025-03-06 RX ADMIN — OXYCODONE HYDROCHLORIDE 5 MILLIGRAM(S): 30 TABLET ORAL at 19:16

## 2025-03-06 RX ADMIN — Medication 400 MILLIGRAM(S): at 02:44

## 2025-03-06 RX ADMIN — POLYETHYLENE GLYCOL 3350 17 GRAM(S): 17 POWDER, FOR SOLUTION ORAL at 12:38

## 2025-03-06 RX ADMIN — Medication 2 TABLET(S): at 22:50

## 2025-03-06 RX ADMIN — Medication 100 MILLIGRAM(S): at 22:49

## 2025-03-06 RX ADMIN — OXYCODONE HYDROCHLORIDE 10 MILLIGRAM(S): 30 TABLET ORAL at 23:20

## 2025-03-06 RX ADMIN — Medication 400 MILLIGRAM(S): at 20:19

## 2025-03-06 RX ADMIN — HEPARIN SODIUM 5000 UNIT(S): 1000 INJECTION INTRAVENOUS; SUBCUTANEOUS at 14:58

## 2025-03-06 RX ADMIN — Medication 4 MILLIGRAM(S): at 11:57

## 2025-03-06 RX ADMIN — Medication 25 MICROGRAM(S): at 08:30

## 2025-03-06 RX ADMIN — Medication 15 MILLILITER(S): at 05:25

## 2025-03-06 RX ADMIN — Medication 25 MICROGRAM(S): at 09:00

## 2025-03-06 RX ADMIN — OXYCODONE HYDROCHLORIDE 5 MILLIGRAM(S): 30 TABLET ORAL at 14:08

## 2025-03-06 RX ADMIN — HEPARIN SODIUM 5000 UNIT(S): 1000 INJECTION INTRAVENOUS; SUBCUTANEOUS at 22:49

## 2025-03-06 RX ADMIN — Medication 250 MILLIGRAM(S): at 05:24

## 2025-03-06 RX ADMIN — Medication 1000 MILLIGRAM(S): at 16:10

## 2025-03-06 RX ADMIN — Medication 400 MILLIGRAM(S): at 09:45

## 2025-03-06 NOTE — PHYSICAL THERAPY INITIAL EVALUATION ADULT - IMPAIRMENTS FOUND, PT EVAL
aerobic capacity/endurance/gait, locomotion, and balance/poor safety awareness/posture/ventilation and respiration/gas exchange

## 2025-03-06 NOTE — PHYSICAL THERAPY INITIAL EVALUATION ADULT - GENERAL OBSERVATIONS, REHAB EVAL
13:45-14:15. chart reviewed. Pt received semi-ruiz at B/S, alert, oriented, able to follow multi-step instructions and agreeable to PT evaluation. + IV, + a-line, + CT X2, + ex pacer, + foly, + O2 4 L via NC, VSS, -ve orthostatic, c/o sternal pain 4/10 post pain medication, NAD.
100

## 2025-03-06 NOTE — PHYSICAL THERAPY INITIAL EVALUATION ADULT - PERTINENT HX OF CURRENT PROBLEM, REHAB EVAL
54 year M with PMhx  includes DLD, HTN, CAD. Mr. NAJERA was worked up for complaints SOB on exertion. He  had a cardiac catheterization which revealed multivessel disease. On 03/05/25, he underwent surgical myocardial revascularization.

## 2025-03-06 NOTE — PHYSICAL THERAPY INITIAL EVALUATION ADULT - GAIT TRAINING, PT EVAL
Pt will ambulate using RW or least restrictive AD for 300 ft with supervision by discharge to facilitate return to PLOF. Pt will negotiate 12 steps using 1 HR under supervision

## 2025-03-06 NOTE — PHYSICAL THERAPY INITIAL EVALUATION ADULT - ADDITIONAL COMMENTS
Pt resides with daughter in a town house, using 1 step to enter and 12 to access bedroom/shower. pt used to be independent with overall functional mobility, able to ambulate using no AD at baseline

## 2025-03-06 NOTE — PROGRESS NOTE ADULT - SUBJECTIVE AND OBJECTIVE BOX
CTU Attending Progress Daily Note     05 Mar 2025 21:36  Procedure: CABG  POD#: 1    He has history of HLD (hyperlipidemia)    Hypertriglyceridemia    DM (diabetes mellitus)    CAD (coronary artery disease)    HTN (hypertension)      HPI:  Mr. BYRON NAJERA is a 54 year M with PMhx  includes DLD, HTN, CAD. Mr. NAJERA was worked up for complaints SOB on exertion. He  had a cardiac catheterization which revealed multivessel disease. On 03/05/25, he underwent surgical myocardial revascularization.    Hospital Course:  3/5 CABG 4 LIMA + Lt Radial  OR C2, Alb1, ,      on Levo and Vaso: Albumin 500 given  post-extubation hypercapnia on BIPAP  chest tube 300ml: Hb 7.7,              1 PRBCs 1 Platelets          OBJECTIVE:  ICU Vital Signs Last 24 Hrs  T(C): 37.5 (06 Mar 2025 16:00), Max: 37.7 (06 Mar 2025 06:00)  T(F): 99.5 (06 Mar 2025 16:00), Max: 99.9 (06 Mar 2025 06:00)  HR: 103 (06 Mar 2025 19:00) (88 - 113)  BP: 105/59 (06 Mar 2025 19:00) (99/61 - 111/64)  BP(mean): 78 (06 Mar 2025 19:00) (73 - 80)  ABP: 102/48 (06 Mar 2025 19:00) (0/0 - 136/51)  ABP(mean): 63 (06 Mar 2025 19:00) (0 - 84)  RR: 22 (06 Mar 2025 19:00) (12 - 43)  SpO2: 98% (06 Mar 2025 19:00) (98% - 100%)    O2 Parameters below as of 06 Mar 2025 19:00  Patient On (Oxygen Delivery Method): nasal cannula  O2 Flow (L/min): 4        I&O's Summary    05 Mar 2025 07:01  -  06 Mar 2025 07:00  --------------------------------------------------------  IN: 3213.1 mL / OUT: 2511 mL / NET: 702.1 mL    06 Mar 2025 07:01  -  06 Mar 2025 19:37  --------------------------------------------------------  IN: 714.6 mL / OUT: 560 mL / NET: 154.6 mL      I&O's Detail    05 Mar 2025 07:01  -  06 Mar 2025 07:00  --------------------------------------------------------  IN:    Albumin 5%  - 500 mL: 500 mL    Amiodarone: 199.8 mL    Dexmedetomidine: 10.5 mL    Insulin: 40 mL    IV PiggyBack: 1400 mL    NiCARdipine: 100 mL    Norepinephrine: 40.8 mL    Platelets - Single Donor: 226 mL    PRBCs (Packed Red Blood Cells): 322 mL    sodium chloride 0.9%: 320 mL    Vasopressin: 54 mL  Total IN: 3213.1 mL    OUT:    Chest Tube (mL): 430 mL    Chest Tube (mL): 360 mL    Intermittent Catheterization - Urethral (mL): 1721 mL  Total OUT: 2511 mL    Total NET: 702.1 mL      06 Mar 2025 07:01  -  06 Mar 2025 19:37  --------------------------------------------------------  IN:    Amiodarone: 66.6 mL    Insulin: 8 mL    IV PiggyBack: 500 mL    sodium chloride 0.9%: 140 mL  Total IN: 714.6 mL    OUT:    Chest Tube (mL): 20 mL    Chest Tube (mL): 60 mL    Intermittent Catheterization - Urethral (mL): 480 mL  Total OUT: 560 mL    Total NET: 154.6 mL        Adult Advanced Hemodynamics Last 24 Hrs  CVP(mm Hg): 21 (06 Mar 2025 10:00) (0 - 32)  CVP(cm H2O): --  CO: 5.8 (06 Mar 2025 08:00) (5.6 - 10.3)  CI: 2.9 (06 Mar 2025 08:00) (2.7 - 5)  PA: 35/21 (06 Mar 2025 10:00) (5/0 - 53/24)  PA(mean): 28 (06 Mar 2025 10:00) (0 - 35)  PCWP: --  SVR: 716 (06 Mar 2025 08:00) (422 - 716)  SVRI: 1432 (06 Mar 2025 08:00) (869 - 1432)  PVR: --  PVRI: --  Mode: CPAP with PS  FiO2: 40  PEEP: 5  PS: 8    CAPILLARY BLOOD GLUCOSE      POCT Blood Glucose.: 129 mg/dL (06 Mar 2025 17:08)  POCT Blood Glucose.: 125 mg/dL (06 Mar 2025 15:09)  POCT Blood Glucose.: 118 mg/dL (06 Mar 2025 13:13)  POCT Blood Glucose.: 110 mg/dL (06 Mar 2025 11:18)  POCT Blood Glucose.: 99 mg/dL (06 Mar 2025 07:56)  POCT Blood Glucose.: 97 mg/dL (06 Mar 2025 06:35)  POCT Blood Glucose.: 120 mg/dL (06 Mar 2025 04:21)  POCT Blood Glucose.: 131 mg/dL (06 Mar 2025 02:02)  POCT Blood Glucose.: 126 mg/dL (05 Mar 2025 22:53)  POCT Blood Glucose.: 129 mg/dL (05 Mar 2025 22:14)  POCT Blood Glucose.: 131 mg/dL (05 Mar 2025 19:40)    LABS:  ABG - ( 06 Mar 2025 06:46 )  pH, Arterial: 7.42  pH, Blood: x     /  pCO2: 38    /  pO2: 165   / HCO3: 25    / Base Excess: 0.2   /  SaO2: 99.8                                    8.2    9.05  )-----------( 183      ( 06 Mar 2025 03:00 )             24.3     03-06    135  |  104  |  12  ----------------------------<  112[H]  4.9   |  23  |  0.9    Ca    8.0[L]      06 Mar 2025 03:00  Mg     1.9     03-06    TPro  5.7[L]  /  Alb  4.4  /  TBili  1.0  /  DBili  x   /  AST  35  /  ALT  16  /  AlkPhos  47  03-06    PT/INR - ( 06 Mar 2025 03:00 )   PT: 13.50 sec;   INR: 1.14 ratio         PTT - ( 06 Mar 2025 03:00 )  PTT:32.4 sec  Urinalysis Basic - ( 06 Mar 2025 03:00 )    Color: x / Appearance: x / SG: x / pH: x  Gluc: 112 mg/dL / Ketone: x  / Bili: x / Urobili: x   Blood: x / Protein: x / Nitrite: x   Leuk Esterase: x / RBC: x / WBC x   Sq Epi: x / Non Sq Epi: x / Bacteria: x        Home Medications:  aspirin 81 mg oral delayed release capsule: orally once a day (05 Mar 2025 06:53)  atorvastatin 80 mg oral tablet: 1 tab(s) orally once a day (05 Mar 2025 06:53)  icosapent 1 g oral capsule: 2 cap(s) orally once a day (05 Mar 2025 06:53)  losartan 25 mg oral tablet: 1 tab(s) orally once a day (05 Mar 2025 06:53)  metFORMIN 500 mg oral tablet: 1 tab(s) orally 2 times a day (05 Mar 2025 06:53)  metoprolol succinate 25 mg oral tablet, extended release: 1 tab(s) orally once a day (05 Mar 2025 06:53)  Ozempic 2 mg/1.5 mL (1 mg dose) subcutaneous solution: subcutaneous once a week (05 Mar 2025 06:53)    HOSPITAL MEDICATIONS:  MEDICATIONS  (STANDING):  acetaminophen     Tablet .. 650 milliGRAM(s) Oral every 6 hours  acetaminophen   IVPB .. 1000 milliGRAM(s) IV Intermittent once  ascorbic acid 500 milliGRAM(s) Oral daily  aspirin enteric coated 81 milliGRAM(s) Oral daily  atorvastatin 80 milliGRAM(s) Oral at bedtime  ceFAZolin   IVPB 1000 milliGRAM(s) IV Intermittent every 8 hours  chlorhexidine 2% Cloths 1 Application(s) Topical daily  dextrose 50% Injectable 50 milliLiter(s) IV Push every 15 minutes  dextrose 50% Injectable 25 milliLiter(s) IV Push every 15 minutes  heparin   Injectable 5000 Unit(s) SubCutaneous every 8 hours  insulin regular Infusion 5 Unit(s)/Hr (5 mL/Hr) IV Continuous <Continuous>  meperidine     Injectable 25 milliGRAM(s) IV Push once  metoprolol tartrate 12.5 milliGRAM(s) Oral every 12 hours  pantoprazole    Tablet 40 milliGRAM(s) Oral before breakfast  polyethylene glycol 3350 17 Gram(s) Oral daily  senna 2 Tablet(s) Oral at bedtime  sodium chloride 0.9%. 1000 milliLiter(s) (20 mL/Hr) IV Continuous <Continuous>    MEDICATIONS  (PRN):  HYDROmorphone  Injectable 0.5 milliGRAM(s) IV Push every 6 hours PRN Breakthrough Pain  melatonin 5 milliGRAM(s) Oral at bedtime PRN Insomnia  ondansetron Injectable 4 milliGRAM(s) IV Push every 4 hours PRN Nausea and/or Vomiting  oxyCODONE    IR 5 milliGRAM(s) Oral every 4 hours PRN Moderate Pain (4 - 6)  oxyCODONE    IR 10 milliGRAM(s) Oral every 4 hours PRN Severe Pain (7 - 10)    RADIOLOGY:  Chest X-ray Reviewed    REVIEW OF SYSTEMS:  CONSTITUTIONAL: [X] all negative; [ ] weakness, [ ] fevers, [ ] chills  EYES/ENT: [X] all negative; [ ] visual changes, [ ] vertigo, [ ] throat pain   NECK: [X] all negative; [ ] pain, [ ] stiffness  RESPIRATORY: [] all negative, [ ] cough, [ ] wheezing, [ ] hemoptysis, [ ] shortness of breath  CARDIOVASCULAR: [] all negative; [ ] chest pain, [ ] palpitations, [ ] orthopnea  GASTROINTESTINAL: [X] all negative; [ ]abdominal pain, [ ] nausea, [ ] vomiting, [ ] hematemesis, [ ] diarrhea, [ ] constipation, [ ] melena, [ ] hematochezia.  GENITOURINARY: [X] all negative; [ ] dysuria, [ ] frequency, [ ] hematuria  NEUROLOGICAL: [X] all negative; [ ] numbness, [ ] weakness  SKIN: [X] all negative; [ ] itching, [ ] burning, [ ] rashes, [ ] lesions   All other review of systems is negative unless indicated above.  [  ] Unable to assess ROS because     PHYSICAL EXAM:          CONSTITUTIONAL: Well-developed; well-nourished; in no acute distress.   	SKIN: warm, dry  	HEAD: Normocephalic; atraumatic.  	EYES: PERRL, EOM, no conj injection, sclera clear  	ENT: No nasal discharge; airway clear.  	NECK: Supple; non tender.   	CARD: S1, S2 normal; no murmurs, gallops, or rubs. Regular rate and rhythm.  no carotid bruits  	RESP: CTA B/L; good air movement No wheezes, rales or rhonchi.  	ABD: Soft, not tender, not distended,  no rebound or guarding, bowel sounds present  	EXT: Normal ROM.  No clubbing, cyanosis or edema.   warm and well perfused.  pulses palpable  	NEURO: Alert, awake, motor 5/5 R, 5/5 L            Assessment   s/p CABG POD 1      off pressors    Acute hypercapnia on BIPAP  d/maliha early in am    Plan:    Neuro:  Multimodal pain management  Tylenol, Lidoderm patch, gabapentin, opiates as needed  Monitor neuro status in the post op period    CV:  S/P CABG  ASA bet blockers started Statein  HTN lopressor started  Monitor perfusion, , lactate, UOP, index and MVO2   Maintain MAP > 65      Resp:  d/c BIPAP  abg improved   Supplemental oxygen to maintain sats > 92%  Continuous pulse oximetry monitoring  IS / Chest PT  Nebulizers as needed    GI:  NPO  Bowel Regimen - escalate as needed  PUD ppx per protocol    Renal  High risk for TERENCE post surgery  Monitor UOP, lytes, creatinine  Diuretics as needed for negative fluid balance  Keep K > 4, Mg > 2    Endo:  Tight glucose control per CTICU protocol  Insulin gtt as needed  Transition to Lantus / SSI and premeal insulin as needed    Heme:  Acute blood loss anemia post surgery  PRBC transfusion ongoing    ID:  Perioperative prophylactic abx per protocol  Monitor fever curve and WBC count  d/c swan  keep chest tubes till am tomorw      Upon my evaluation, the above patient has a high probability of imminent or life threatening deterioration due to a high risk for Shock, Cardiogenic shock, arrythmias, acute blood loss, acute kidney injury and acute pulmonary insufficiency which required my direct attention, intervention, and personal management.  Total time was 55 minutes which includes review of radiology results, ordering, interpreting and reviewing diagnostic studies / lab tests with immediate assessment and treatment, consultant collaboration on findings and treatment options, monitoring for potential decompensation, obtaining history from family, EMT, nursing home staff and/or treating physicians; directing the titration of pressors, oxygen support devices, fluid resuscitation, interpretation of cardiac output measurements, interpretation of radiological assessments, interpretation of EKG / rhythm strips, telemetry.  This time did not overlap with the management of other patients or performing separately reportable procedures.      Management of this patient was discussed with the CT surgery attending and mid-level providers.    I acknowledge the use of copied documentation.  All copy forward documentation is my own and has been reviewed and revised as appropriate.  If no changes were made, it is because that information remains the same.

## 2025-03-06 NOTE — PROGRESS NOTE ADULT - SUBJECTIVE AND OBJECTIVE BOX
OPERATIVE PROCEDURE(s):   cabg x 4/ left radial             POD # 1    SURGEON(s): kobe    SUBJECTIVE ASSESSMENT: pt is complaining of chest pain that responds to pain meds    Vital Signs Last 24 Hrs  T(C): 37.4 (06 Mar 2025 12:00), Max: 37.7 (06 Mar 2025 06:00)  T(F): 99.3 (06 Mar 2025 12:00), Max: 99.9 (06 Mar 2025 06:00)  HR: 88 (06 Mar 2025 13:00) (72 - 113)  BP: 111/64 (06 Mar 2025 01:03) (95/53 - 111/64)  BP(mean): 80 (06 Mar 2025 01:03) (72 - 80)  RR: 28 (06 Mar 2025 13:00) (12 - 43)  SpO2: 100% (06 Mar 2025 13:00) (98% - 100%)    Parameters below as of 06 Mar 2025 14:00  Patient On (Oxygen Delivery Method): nasal cannula  O2 Flow (L/min): 4    03-05-25 @ 07:01  -  03-06-25 @ 07:00  --------------------------------------------------------  IN: 3213.1 mL / OUT: 2511 mL / NET: 702.1 mL    03-06-25 @ 07:01  -  03-06-25 @ 14:26  --------------------------------------------------------  IN: 109.6 mL / OUT: 125 mL / NET: -15.4 mL    Physical Exam  General: alert and oriented x 3  Chest: cta bl   CVS: s1s2  Abd: pos bs soft nt  GI/ :  Ext: no sig edema  incisions- dressed  sternum- intact    LABS:                        8.2    9.05  )-----------( 183      ( 06 Mar 2025 03:00 )             24.3     COUMADIN:   [ ] YES [x ] NO    PT/INR - ( 06 Mar 2025 03:00 )   PT: 13.50 sec;   INR: 1.14 ratio         PTT - ( 06 Mar 2025 03:00 )  PTT:32.4 sec  03-06    135  |  104  |  12  ----------------------------<  112[H]  4.9   |  23  |  0.9    Ca    8.0[L]      06 Mar 2025 03:00  Mg     1.9     03-06    TPro  5.7[L]  /  Alb  4.4  /  TBili  1.0  /  DBili  x   /  AST  35  /  ALT  16  /  AlkPhos  47  03-06    Urinalysis Basic - ( 06 Mar 2025 03:00 )    Color: x / Appearance: x / SG: x / pH: x  Gluc: 112 mg/dL / Ketone: x  / Bili: x / Urobili: x   Blood: x / Protein: x / Nitrite: x   Leuk Esterase: x / RBC: x / WBC x   Sq Epi: x / Non Sq Epi: x / Bacteria: x        MEDICATIONS  (STANDING):  acetaminophen     Tablet .. 650 milliGRAM(s) Oral every 6 hours  aspirin enteric coated 81 milliGRAM(s) Oral daily  atorvastatin 80 milliGRAM(s) Oral at bedtime  ceFAZolin   IVPB 1000 milliGRAM(s) IV Intermittent every 8 hours  chlorhexidine 2% Cloths 1 Application(s) Topical daily  dextrose 50% Injectable 50 milliLiter(s) IV Push every 15 minutes  dextrose 50% Injectable 25 milliLiter(s) IV Push every 15 minutes  heparin   Injectable 5000 Unit(s) SubCutaneous every 8 hours  insulin regular Infusion 5 Unit(s)/Hr (5 mL/Hr) IV Continuous <Continuous>  meperidine     Injectable 25 milliGRAM(s) IV Push once  metoprolol tartrate 12.5 milliGRAM(s) Oral every 12 hours  pantoprazole    Tablet 40 milliGRAM(s) Oral before breakfast  polyethylene glycol 3350 17 Gram(s) Oral daily  senna 2 Tablet(s) Oral at bedtime  sodium chloride 0.9%. 1000 milliLiter(s) (20 mL/Hr) IV Continuous <Continuous>  vancomycin  IVPB 1000 milliGRAM(s) IV Intermittent every 12 hours    MEDICATIONS  (PRN):  HYDROmorphone  Injectable 0.5 milliGRAM(s) IV Push every 6 hours PRN Breakthrough Pain  melatonin 5 milliGRAM(s) Oral at bedtime PRN Insomnia  ondansetron Injectable 4 milliGRAM(s) IV Push every 4 hours PRN Nausea and/or Vomiting  oxyCODONE    IR 5 milliGRAM(s) Oral every 4 hours PRN Moderate Pain (4 - 6)  oxyCODONE    IR 10 milliGRAM(s) Oral every 4 hours PRN Severe Pain (7 - 10)    Allergies    No Known Allergies    Intolerances    Ambulation/Activity Status:  ambulates well with pt    RADIOLOGY & ADDITIONAL TESTS:  Diet, DASH/TLC:   Sodium & Cholesterol Restricted  Consistent Carbohydrate Evening Snack (CSTCHOSN) (03-06-25 @ 09:33)      ACC: [id number] EXAM:  XR CHEST PORTABLE ROUTINE 1V   ORDERED BY: ABBY KU     PROCEDURE DATE:  03/06/2025      INTERPRETATION:  CLINICAL HISTORY: Postoperative evaluation    COMPARISON: March 5, 2025.    TECHNIQUE: Portable frontal chest radiograph.    FINDINGS:    Support devices: Right IJ [place]-Lan catheter and left-sided chest tube in   place    Cardiac/mediastinum/hilum: Stable.    Lung parenchyma/Pleura: Slightly increasing left effusion. No definite   pneumothorax.    Skeleton/soft tissues: Stable.    IMPRESSION:    Slightly increasing left effusion. No definite pneumothorax.    --- End of Report ---    Assessment/Plan: Patient is a 55 yo M s/p CABG x 4 / left radial pod # 1  cont present tx as per ct surgeon  s/p cabg- cont asa, lopressor, and statin  keep chest tubes in for today as per surgeon  acute blood loss anemia- secondary to surgery; cont to monitor s/p transfusion  pain management  d/c femoral a line and swan  encourage is and ambulation with pt  cad- add plavix once chest tubes are out  s/p radial artery harvest- start norvasc once blood press allows    Social Service Disposition:  home in a few days

## 2025-03-07 LAB
ALBUMIN SERPL ELPH-MCNC: 3.8 G/DL — SIGNIFICANT CHANGE UP (ref 3.5–5.2)
ALP SERPL-CCNC: 50 U/L — SIGNIFICANT CHANGE UP (ref 30–115)
ALT FLD-CCNC: 23 U/L — SIGNIFICANT CHANGE UP (ref 0–41)
ANION GAP SERPL CALC-SCNC: 10 MMOL/L — SIGNIFICANT CHANGE UP (ref 7–14)
APTT BLD: 35.4 SEC — SIGNIFICANT CHANGE UP (ref 27–39.2)
AST SERPL-CCNC: 34 U/L — SIGNIFICANT CHANGE UP (ref 0–41)
BASOPHILS # BLD AUTO: 0.01 K/UL — SIGNIFICANT CHANGE UP (ref 0–0.2)
BASOPHILS NFR BLD AUTO: 0.1 % — SIGNIFICANT CHANGE UP (ref 0–1)
BILIRUB SERPL-MCNC: 0.9 MG/DL — SIGNIFICANT CHANGE UP (ref 0.2–1.2)
BUN SERPL-MCNC: 17 MG/DL — SIGNIFICANT CHANGE UP (ref 10–20)
CALCIUM SERPL-MCNC: 7.6 MG/DL — LOW (ref 8.4–10.5)
CHLORIDE SERPL-SCNC: 103 MMOL/L — SIGNIFICANT CHANGE UP (ref 98–110)
CO2 SERPL-SCNC: 25 MMOL/L — SIGNIFICANT CHANGE UP (ref 17–32)
CREAT SERPL-MCNC: 0.7 MG/DL — SIGNIFICANT CHANGE UP (ref 0.7–1.5)
EGFR: 110 ML/MIN/1.73M2 — SIGNIFICANT CHANGE UP
EGFR: 110 ML/MIN/1.73M2 — SIGNIFICANT CHANGE UP
EOSINOPHIL # BLD AUTO: 0 K/UL — SIGNIFICANT CHANGE UP (ref 0–0.7)
EOSINOPHIL NFR BLD AUTO: 0 % — SIGNIFICANT CHANGE UP (ref 0–8)
GLUCOSE BLDC GLUCOMTR-MCNC: 108 MG/DL — HIGH (ref 70–99)
GLUCOSE BLDC GLUCOMTR-MCNC: 134 MG/DL — HIGH (ref 70–99)
GLUCOSE BLDC GLUCOMTR-MCNC: 135 MG/DL — HIGH (ref 70–99)
GLUCOSE BLDC GLUCOMTR-MCNC: 136 MG/DL — HIGH (ref 70–99)
GLUCOSE BLDC GLUCOMTR-MCNC: 147 MG/DL — HIGH (ref 70–99)
GLUCOSE BLDC GLUCOMTR-MCNC: 170 MG/DL — HIGH (ref 70–99)
GLUCOSE BLDC GLUCOMTR-MCNC: 193 MG/DL — HIGH (ref 70–99)
GLUCOSE SERPL-MCNC: 132 MG/DL — HIGH (ref 70–99)
HCT VFR BLD CALC: 22 % — LOW (ref 42–52)
HGB BLD-MCNC: 7.5 G/DL — LOW (ref 14–18)
IMM GRANULOCYTES NFR BLD AUTO: 0.5 % — HIGH (ref 0.1–0.3)
INR BLD: 1.68 RATIO — HIGH (ref 0.65–1.3)
LYMPHOCYTES # BLD AUTO: 0.84 K/UL — LOW (ref 1.2–3.4)
LYMPHOCYTES # BLD AUTO: 8.2 % — LOW (ref 20.5–51.1)
MAGNESIUM SERPL-MCNC: 2 MG/DL — SIGNIFICANT CHANGE UP (ref 1.8–2.4)
MCHC RBC-ENTMCNC: 26.3 PG — LOW (ref 27–31)
MCHC RBC-ENTMCNC: 34.1 G/DL — SIGNIFICANT CHANGE UP (ref 32–37)
MCV RBC AUTO: 77.2 FL — LOW (ref 80–94)
MONOCYTES # BLD AUTO: 0.81 K/UL — HIGH (ref 0.1–0.6)
MONOCYTES NFR BLD AUTO: 7.9 % — SIGNIFICANT CHANGE UP (ref 1.7–9.3)
NEUTROPHILS # BLD AUTO: 8.5 K/UL — HIGH (ref 1.4–6.5)
NEUTROPHILS NFR BLD AUTO: 83.3 % — HIGH (ref 42.2–75.2)
NRBC BLD AUTO-RTO: 0 /100 WBCS — SIGNIFICANT CHANGE UP (ref 0–0)
PLATELET # BLD AUTO: 145 K/UL — SIGNIFICANT CHANGE UP (ref 130–400)
PMV BLD: 10.2 FL — SIGNIFICANT CHANGE UP (ref 7.4–10.4)
POTASSIUM SERPL-MCNC: 4 MMOL/L — SIGNIFICANT CHANGE UP (ref 3.5–5)
POTASSIUM SERPL-SCNC: 4 MMOL/L — SIGNIFICANT CHANGE UP (ref 3.5–5)
PROT SERPL-MCNC: 5.3 G/DL — LOW (ref 6–8)
PROTHROM AB SERPL-ACNC: 20 SEC — HIGH (ref 9.95–12.87)
RBC # BLD: 2.85 M/UL — LOW (ref 4.7–6.1)
RBC # FLD: 15.1 % — HIGH (ref 11.5–14.5)
SODIUM SERPL-SCNC: 138 MMOL/L — SIGNIFICANT CHANGE UP (ref 135–146)
WBC # BLD: 10.21 K/UL — SIGNIFICANT CHANGE UP (ref 4.8–10.8)
WBC # FLD AUTO: 10.21 K/UL — SIGNIFICANT CHANGE UP (ref 4.8–10.8)

## 2025-03-07 PROCEDURE — 93010 ELECTROCARDIOGRAM REPORT: CPT

## 2025-03-07 PROCEDURE — 99291 CRITICAL CARE FIRST HOUR: CPT

## 2025-03-07 PROCEDURE — 71045 X-RAY EXAM CHEST 1 VIEW: CPT | Mod: 26

## 2025-03-07 RX ORDER — INSULIN LISPRO 100 U/ML
INJECTION, SOLUTION INTRAVENOUS; SUBCUTANEOUS
Refills: 0 | Status: DISCONTINUED | OUTPATIENT
Start: 2025-03-07 | End: 2025-03-14

## 2025-03-07 RX ORDER — GABAPENTIN 400 MG/1
300 CAPSULE ORAL THREE TIMES A DAY
Refills: 0 | Status: DISCONTINUED | OUTPATIENT
Start: 2025-03-07 | End: 2025-03-14

## 2025-03-07 RX ORDER — METOPROLOL SUCCINATE 50 MG/1
12.5 TABLET, EXTENDED RELEASE ORAL EVERY 12 HOURS
Refills: 0 | Status: DISCONTINUED | OUTPATIENT
Start: 2025-03-07 | End: 2025-03-07

## 2025-03-07 RX ORDER — SODIUM CHLORIDE 9 G/1000ML
1000 INJECTION, SOLUTION INTRAVENOUS
Refills: 0 | Status: DISCONTINUED | OUTPATIENT
Start: 2025-03-07 | End: 2025-03-14

## 2025-03-07 RX ORDER — FOLIC ACID 1 MG/1
1 TABLET ORAL DAILY
Refills: 0 | Status: DISCONTINUED | OUTPATIENT
Start: 2025-03-07 | End: 2025-03-14

## 2025-03-07 RX ORDER — METOPROLOL SUCCINATE 50 MG/1
25 TABLET, EXTENDED RELEASE ORAL
Refills: 0 | Status: DISCONTINUED | OUTPATIENT
Start: 2025-03-07 | End: 2025-03-07

## 2025-03-07 RX ORDER — FERROUS SULFATE 137(45) MG
325 TABLET, EXTENDED RELEASE ORAL DAILY
Refills: 0 | Status: DISCONTINUED | OUTPATIENT
Start: 2025-03-07 | End: 2025-03-07

## 2025-03-07 RX ORDER — INSULIN LISPRO 100 U/ML
INJECTION, SOLUTION INTRAVENOUS; SUBCUTANEOUS AT BEDTIME
Refills: 0 | Status: DISCONTINUED | OUTPATIENT
Start: 2025-03-07 | End: 2025-03-14

## 2025-03-07 RX ORDER — ALBUMIN (HUMAN) 12.5 G/50ML
50 INJECTION, SOLUTION INTRAVENOUS ONCE
Refills: 0 | Status: COMPLETED | OUTPATIENT
Start: 2025-03-07 | End: 2025-03-07

## 2025-03-07 RX ORDER — IRON SUCROSE 20 MG/ML
200 INJECTION, SOLUTION INTRAVENOUS EVERY 24 HOURS
Refills: 0 | Status: DISCONTINUED | OUTPATIENT
Start: 2025-03-07 | End: 2025-03-11

## 2025-03-07 RX ORDER — DEXTROSE 50 % IN WATER 50 %
15 SYRINGE (ML) INTRAVENOUS ONCE
Refills: 0 | Status: DISCONTINUED | OUTPATIENT
Start: 2025-03-07 | End: 2025-03-14

## 2025-03-07 RX ORDER — MELATONIN 5 MG
5 TABLET ORAL AT BEDTIME
Refills: 0 | Status: DISCONTINUED | OUTPATIENT
Start: 2025-03-07 | End: 2025-03-14

## 2025-03-07 RX ORDER — METOPROLOL SUCCINATE 50 MG/1
25 TABLET, EXTENDED RELEASE ORAL
Refills: 0 | Status: DISCONTINUED | OUTPATIENT
Start: 2025-03-07 | End: 2025-03-08

## 2025-03-07 RX ORDER — FUROSEMIDE 10 MG/ML
20 INJECTION INTRAMUSCULAR; INTRAVENOUS DAILY
Refills: 0 | Status: DISCONTINUED | OUTPATIENT
Start: 2025-03-07 | End: 2025-03-09

## 2025-03-07 RX ORDER — GLUCAGON 3 MG/1
1 POWDER NASAL ONCE
Refills: 0 | Status: DISCONTINUED | OUTPATIENT
Start: 2025-03-07 | End: 2025-03-14

## 2025-03-07 RX ORDER — PROCHLORPERAZINE 25 MG
10 SUPPOSITORY, RECTAL RECTAL ONCE
Refills: 0 | Status: COMPLETED | OUTPATIENT
Start: 2025-03-07 | End: 2025-03-07

## 2025-03-07 RX ADMIN — ALBUMIN (HUMAN) 50 MILLILITER(S): 12.5 INJECTION, SOLUTION INTRAVENOUS at 11:53

## 2025-03-07 RX ADMIN — HEPARIN SODIUM 5000 UNIT(S): 1000 INJECTION INTRAVENOUS; SUBCUTANEOUS at 14:23

## 2025-03-07 RX ADMIN — Medication 20 MILLIEQUIVALENT(S): at 05:21

## 2025-03-07 RX ADMIN — Medication 20 MILLIEQUIVALENT(S): at 07:07

## 2025-03-07 RX ADMIN — POLYETHYLENE GLYCOL 3350 17 GRAM(S): 17 POWDER, FOR SOLUTION ORAL at 11:52

## 2025-03-07 RX ADMIN — METOPROLOL SUCCINATE 25 MILLIGRAM(S): 50 TABLET, EXTENDED RELEASE ORAL at 18:19

## 2025-03-07 RX ADMIN — Medication 100 MILLIGRAM(S): at 14:23

## 2025-03-07 RX ADMIN — Medication 1000 MILLIGRAM(S): at 19:33

## 2025-03-07 RX ADMIN — HEPARIN SODIUM 5000 UNIT(S): 1000 INJECTION INTRAVENOUS; SUBCUTANEOUS at 05:19

## 2025-03-07 RX ADMIN — Medication 81 MILLIGRAM(S): at 11:52

## 2025-03-07 RX ADMIN — GABAPENTIN 300 MILLIGRAM(S): 400 CAPSULE ORAL at 14:23

## 2025-03-07 RX ADMIN — Medication 400 MILLIGRAM(S): at 02:18

## 2025-03-07 RX ADMIN — FUROSEMIDE 20 MILLIGRAM(S): 10 INJECTION INTRAMUSCULAR; INTRAVENOUS at 11:53

## 2025-03-07 RX ADMIN — Medication 1000 MILLIGRAM(S): at 08:30

## 2025-03-07 RX ADMIN — OXYCODONE HYDROCHLORIDE 5 MILLIGRAM(S): 30 TABLET ORAL at 22:12

## 2025-03-07 RX ADMIN — Medication 40 MILLIGRAM(S): at 05:20

## 2025-03-07 RX ADMIN — Medication 100 MILLIGRAM(S): at 05:20

## 2025-03-07 RX ADMIN — METOPROLOL SUCCINATE 25 MILLIGRAM(S): 50 TABLET, EXTENDED RELEASE ORAL at 06:29

## 2025-03-07 RX ADMIN — OXYCODONE HYDROCHLORIDE 5 MILLIGRAM(S): 30 TABLET ORAL at 05:50

## 2025-03-07 RX ADMIN — IRON SUCROSE 100 MILLIGRAM(S): 20 INJECTION, SOLUTION INTRAVENOUS at 11:53

## 2025-03-07 RX ADMIN — Medication 1000 MILLIGRAM(S): at 22:04

## 2025-03-07 RX ADMIN — Medication 400 MILLIGRAM(S): at 21:36

## 2025-03-07 RX ADMIN — Medication 5 UNIT(S)/HR: at 02:18

## 2025-03-07 RX ADMIN — Medication 400 MILLIGRAM(S): at 14:23

## 2025-03-07 RX ADMIN — HEPARIN SODIUM 5000 UNIT(S): 1000 INJECTION INTRAVENOUS; SUBCUTANEOUS at 21:36

## 2025-03-07 RX ADMIN — Medication 500 MILLIGRAM(S): at 11:52

## 2025-03-07 RX ADMIN — FOLIC ACID 1 MILLIGRAM(S): 1 TABLET ORAL at 11:52

## 2025-03-07 RX ADMIN — Medication 400 MILLIGRAM(S): at 08:00

## 2025-03-07 RX ADMIN — Medication 5 MILLIGRAM(S): at 21:35

## 2025-03-07 RX ADMIN — Medication 2 TABLET(S): at 21:36

## 2025-03-07 RX ADMIN — Medication 4 MILLIGRAM(S): at 08:30

## 2025-03-07 RX ADMIN — GABAPENTIN 300 MILLIGRAM(S): 400 CAPSULE ORAL at 21:35

## 2025-03-07 RX ADMIN — OXYCODONE HYDROCHLORIDE 5 MILLIGRAM(S): 30 TABLET ORAL at 05:19

## 2025-03-07 RX ADMIN — Medication 10 MILLIGRAM(S): at 08:45

## 2025-03-07 RX ADMIN — Medication 1000 MILLIGRAM(S): at 02:50

## 2025-03-07 RX ADMIN — ATORVASTATIN CALCIUM 80 MILLIGRAM(S): 80 TABLET, FILM COATED ORAL at 21:35

## 2025-03-07 NOTE — PROGRESS NOTE ADULT - ASSESSMENT
Assessment   s/p CABG POD 3 off pressors      Plan:    Neuro:  Multimodal pain management  Tylenol, Lidoderm patch, gabapentin, opiates as needed  Monitor neuro status in the post op period    CV:  S/P CABG  ASA bet blockers started Statein  HTN lopressor started  Monitor perfusion, , lactate, UOP, index and MVO2   Maintain MAP > 65  Start diuresis  Remove chest tubes tomorrow    Resp:  L lung atelactasis - no effusion on POCUS  Supplemental oxygen to maintain sats > 92%  Continuous pulse oximetry monitoring  IS / Chest PT  Nebulizers as needed  Flutter valve  Nebulizers    GI:  Advance diet - cardiac  Bowel Regimen - escalate as needed  PUD ppx per protocol    Renal  High risk for TERENCE post surgery  Monitor UOP, lytes, creatinine  Start lasix today - goal negative 1L   Keep K > 4, Mg > 2    Endo:  Tight glucose control per CTICU protocol  Insulin gtt as needed  Transition to Lantus / SSI and premeal insulin as needed    Heme:  Acute blood loss anemia post surgery  DVT ppx    ID:  Perioperative prophylactic abx per protocol  Monitor fever curve and WBC count      Upon my evaluation, the above patient has a high probability of imminent or life threatening deterioration due to a high risk for Shock, Cardiogenic shock, arrythmia, acute blood loss, acute kidney injury and acute pulmonary insufficiency which required my direct attention, intervention, and personal management.  Total critical care time indicated in the note includes review of radiology results, ordering, interpreting and reviewing diagnostic studies / lab tests with immediate assessment and treatment, consultant collaboration on findings and treatment options, monitoring for potential decompensation, obtaining history from family, EMT, nursing home staff and/or treating physicians; directing the titration of pressors, oxygen support devices, fluid resuscitation, interpretation of cardiac output measurements, interpretation of radiological assessments, interpretation of EKG / rhythm strips, telemetry.  This time did not overlap with the management of other patients or performing separately reportable procedures.      Management of this patient was discussed with the CT Surgery/Thoracic surgery/Structural Cardiology attending and mid-level providers.    I acknowledge the use of copied documentation.  All copy forward documentation is my own and has been reviewed and revised as appropriate.  If no changes were made, it is because that information remains the same.

## 2025-03-07 NOTE — PROGRESS NOTE ADULT - SUBJECTIVE AND OBJECTIVE BOX
CTU Attending Progress Daily Note       Procedure: CABG  POD#: 3    He has history of HLD (hyperlipidemia)    Hypertriglyceridemia    DM (diabetes mellitus)    CAD (coronary artery disease)    HTN (hypertension)      HPI:  Mr. BYRON NAJERA is a 54 year M with PMhx  includes DLD, HTN, CAD. Mr. NAJERA was worked up for complaints SOB on exertion. He  had a cardiac catheterization which revealed multivessel disease. On 03/05/25, he underwent surgical myocardial revascularization.    Hospital Course:  3/5 CABG 4 LIMA + Lt Radial  OR C2, Alb1, ,      on Levo and Vaso: Albumin 500 given  post-extubation hypercapnia on BIPAP  chest tube 300ml: Hb 7.7,              1 PRBCs 1 Platelets  3/6 Stable hemodynamics, off pressors, start BB  3/7 started diuresis    OBJECTIVE:  ICU Vital Signs Last 24 Hrs  T(C): 37.1 (07 Mar 2025 16:00), Max: 37.3 (07 Mar 2025 04:00)  T(F): 98.7 (07 Mar 2025 16:00), Max: 99.2 (07 Mar 2025 04:00)  HR: 94 (07 Mar 2025 21:00) (88 - 106)  BP: 114/70 (07 Mar 2025 11:00) (114/70 - 114/70)  BP(mean): 87 (07 Mar 2025 11:00) (87 - 87)  ABP: 126/59 (07 Mar 2025 21:00) (92/41 - 143/64)  ABP(mean): 80 (07 Mar 2025 21:00) (55 - 95)  RR: 16 (07 Mar 2025 21:00) (12 - 36)  SpO2: 100% (07 Mar 2025 21:00) (97% - 100%)    O2 Parameters below as of 07 Mar 2025 21:00  Patient On (Oxygen Delivery Method): nasal cannula  O2 Flow (L/min): 2        I&O's Summary    06 Mar 2025 07:01  -  07 Mar 2025 07:00  --------------------------------------------------------  IN: 1610.6 mL / OUT: 1435 mL / NET: 175.6 mL    07 Mar 2025 07:01  -  07 Mar 2025 22:22  --------------------------------------------------------  IN: 184 mL / OUT: 1330 mL / NET: -1146 mL      I&O's Detail    06 Mar 2025 07:01  -  07 Mar 2025 07:00  --------------------------------------------------------  IN:    Amiodarone: 66.6 mL    Insulin: 24 mL    IV PiggyBack: 800 mL    Oral Fluid: 340 mL    sodium chloride 0.9%: 380 mL  Total IN: 1610.6 mL    OUT:    Chest Tube (mL): 230 mL    Chest Tube (mL): 160 mL    Intermittent Catheterization - Urethral (mL): 480 mL    Voided (mL): 565 mL  Total OUT: 1435 mL    Total NET: 175.6 mL      07 Mar 2025 07:01  -  07 Mar 2025 22:22  --------------------------------------------------------  IN:    Insulin: 24 mL    sodium chloride 0.9%: 160 mL  Total IN: 184 mL    OUT:    Chest Tube (mL): 170 mL    Chest Tube (mL): 80 mL    Indwelling Catheter - Urethral (mL): 1080 mL  Total OUT: 1330 mL    Total NET: -1146 mL        Adult Advanced Hemodynamics Last 24 Hrs  CVP(mm Hg): --  CVP(cm H2O): --  CO: --  CI: --  PA: --  PA(mean): --  PCWP: --  SVR: --  SVRI: --  PVR: --  PVRI: --    CAPILLARY BLOOD GLUCOSE      POCT Blood Glucose.: 135 mg/dL (07 Mar 2025 21:40)  POCT Blood Glucose.: 108 mg/dL (07 Mar 2025 16:48)  POCT Blood Glucose.: 170 mg/dL (07 Mar 2025 12:23)  POCT Blood Glucose.: 193 mg/dL (07 Mar 2025 09:54)  POCT Blood Glucose.: 136 mg/dL (07 Mar 2025 05:38)  POCT Blood Glucose.: 134 mg/dL (07 Mar 2025 03:59)  POCT Blood Glucose.: 147 mg/dL (07 Mar 2025 01:40)  POCT Blood Glucose.: 148 mg/dL (06 Mar 2025 22:41)    LABS:  ABG - ( 06 Mar 2025 06:46 )  pH, Arterial: 7.42  pH, Blood: x     /  pCO2: 38    /  pO2: 165   / HCO3: 25    / Base Excess: 0.2   /  SaO2: 99.8                                    7.5    10.21 )-----------( 145      ( 07 Mar 2025 02:15 )             22.0     03-07    138  |  103  |  17  ----------------------------<  132[H]  4.0   |  25  |  0.7    Ca    7.6[L]      07 Mar 2025 02:15  Mg     2.0     03-07    TPro  5.3[L]  /  Alb  3.8  /  TBili  0.9  /  DBili  x   /  AST  34  /  ALT  23  /  AlkPhos  50  03-07    PT/INR - ( 07 Mar 2025 02:15 )   PT: 20.00 sec;   INR: 1.68 ratio         PTT - ( 07 Mar 2025 02:15 )  PTT:35.4 sec  Urinalysis Basic - ( 07 Mar 2025 02:15 )    Color: x / Appearance: x / SG: x / pH: x  Gluc: 132 mg/dL / Ketone: x  / Bili: x / Urobili: x   Blood: x / Protein: x / Nitrite: x   Leuk Esterase: x / RBC: x / WBC x   Sq Epi: x / Non Sq Epi: x / Bacteria: x        Home Medications:  aspirin 81 mg oral delayed release capsule: orally once a day (05 Mar 2025 06:53)  atorvastatin 80 mg oral tablet: 1 tab(s) orally once a day (05 Mar 2025 06:53)  icosapent 1 g oral capsule: 2 cap(s) orally once a day (05 Mar 2025 06:53)  losartan 25 mg oral tablet: 1 tab(s) orally once a day (05 Mar 2025 06:53)  metFORMIN 500 mg oral tablet: 1 tab(s) orally 2 times a day (05 Mar 2025 06:53)  metoprolol succinate 25 mg oral tablet, extended release: 1 tab(s) orally once a day (05 Mar 2025 06:53)  Ozempic 2 mg/1.5 mL (1 mg dose) subcutaneous solution: subcutaneous once a week (05 Mar 2025 06:53)    HOSPITAL MEDICATIONS:  MEDICATIONS  (STANDING):  ascorbic acid 500 milliGRAM(s) Oral daily  aspirin enteric coated 81 milliGRAM(s) Oral daily  atorvastatin 80 milliGRAM(s) Oral at bedtime  bisacodyl Suppository 10 milliGRAM(s) Rectal once  chlorhexidine 2% Cloths 1 Application(s) Topical daily  dextrose 5%. 1000 milliLiter(s) (50 mL/Hr) IV Continuous <Continuous>  dextrose 5%. 1000 milliLiter(s) (100 mL/Hr) IV Continuous <Continuous>  dextrose 50% Injectable 50 milliLiter(s) IV Push every 15 minutes  dextrose 50% Injectable 25 milliLiter(s) IV Push every 15 minutes  folic acid 1 milliGRAM(s) Oral daily  furosemide   Injectable 20 milliGRAM(s) IV Push daily  gabapentin 300 milliGRAM(s) Oral three times a day  glucagon  Injectable 1 milliGRAM(s) IntraMuscular once  heparin   Injectable 5000 Unit(s) SubCutaneous every 8 hours  insulin lispro (ADMELOG) corrective regimen sliding scale   SubCutaneous three times a day before meals  insulin lispro (ADMELOG) corrective regimen sliding scale   SubCutaneous at bedtime  iron sucrose IVPB 200 milliGRAM(s) IV Intermittent every 24 hours  melatonin 5 milliGRAM(s) Oral at bedtime  metoprolol tartrate 25 milliGRAM(s) Oral two times a day  pantoprazole    Tablet 40 milliGRAM(s) Oral before breakfast  polyethylene glycol 3350 17 Gram(s) Oral daily  senna 2 Tablet(s) Oral at bedtime  sodium chloride 0.9%. 1000 milliLiter(s) (20 mL/Hr) IV Continuous <Continuous>    MEDICATIONS  (PRN):  dextrose Oral Gel 15 Gram(s) Oral once PRN Blood Glucose LESS THAN 70 milliGRAM(s)/deciliter  melatonin 5 milliGRAM(s) Oral at bedtime PRN Insomnia  oxyCODONE    IR 5 milliGRAM(s) Oral every 4 hours PRN Moderate Pain (4 - 6)  oxyCODONE    IR 10 milliGRAM(s) Oral every 4 hours PRN Severe Pain (7 - 10)    RADIOLOGY:  Chest X-ray Reviewed    REVIEW OF SYSTEMS:  CONSTITUTIONAL: [X] all negative; [ ] weakness, [ ] fevers, [ ] chills  EYES/ENT: [X] all negative; [ ] visual changes, [ ] vertigo, [ ] throat pain   NECK: [X] all negative; [ ] pain, [ ] stiffness  RESPIRATORY: [] all negative, [ ] cough, [ ] wheezing, [ ] hemoptysis, [ ] shortness of breath  CARDIOVASCULAR: [] all negative; [ ] chest pain, [ ] palpitations, [ ] orthopnea  GASTROINTESTINAL: [X] all negative; [ ]abdominal pain, [ ] nausea, [ ] vomiting, [ ] hematemesis, [ ] diarrhea, [ ] constipation, [ ] melena, [ ] hematochezia.  GENITOURINARY: [X] all negative; [ ] dysuria, [ ] frequency, [ ] hematuria  NEUROLOGICAL: [X] all negative; [ ] numbness, [ ] weakness  SKIN: [X] all negative; [ ] itching, [ ] burning, [ ] rashes, [ ] lesions   All other review of systems is negative unless indicated above.  [  ] Unable to assess ROS because     PHYSICAL EXAM:          CONSTITUTIONAL: Well-developed; well-nourished; in no acute distress.   	SKIN: warm, dry  	HEAD: Normocephalic; atraumatic.  	EYES: PERRL, EOM, no conj injection, sclera clear  	ENT: No nasal discharge; airway clear.  	NECK: Supple; non tender.   	CARD: S1, S2 normal; no murmurs, gallops, or rubs. Regular rate and rhythm.  no carotid bruits  	RESP: CTA B/L; good air movement No wheezes, rales or rhonchi.  	ABD: Soft, not tender, not distended,  no rebound or guarding, bowel sounds present  	EXT: Normal ROM.  No clubbing, cyanosis or edema.   warm and well perfused.  pulses palpable  	NEURO: Alert, awake, motor 5/5 R, 5/5 L

## 2025-03-07 NOTE — PROGRESS NOTE ADULT - SUBJECTIVE AND OBJECTIVE BOX
OPERATIVE PROCEDURE(s):   CABgx4 + left radial              POD #   2                    54yMale  SURGEON(s): YARED Babcock  SUBJECTIVE ASSESSMENT: pt seen and examined. no acute complaints at this time  Vital Signs Last 24 Hrs  T(F): 99.2 (07 Mar 2025 04:00), Max: 99.2 (06 Mar 2025 20:00)  HR: 100 (07 Mar 2025 16:00) (88 - 106)  BP: 114/70 (07 Mar 2025 11:00) (103/63 - 114/70)  BP(mean): 87 (07 Mar 2025 11:00) (75 - 87)  ABP: 132/65 (07 Mar 2025 16:00) (90/41 - 133/61)  ABP(mean): 85 (07 Mar 2025 16:00)  RR: 25 (07 Mar 2025 16:00) (12 - 32)  SpO2: 99% (07 Mar 2025 16:00) (97% - 100%)      I&O's Detail    06 Mar 2025 07:01  -  07 Mar 2025 07:00  --------------------------------------------------------  IN:    Amiodarone: 66.6 mL    Insulin: 24 mL    IV PiggyBack: 800 mL    Oral Fluid: 340 mL    sodium chloride 0.9%: 380 mL  Total IN: 1610.6 mL    OUT:    Chest Tube (mL): 230 mL    Chest Tube (mL): 160 mL    Intermittent Catheterization - Urethral (mL): 480 mL    Voided (mL): 565 mL  Total OUT: 1435 mL        Net:   I&O's Detail    05 Mar 2025 07:01  -  06 Mar 2025 07:00  --------------------------------------------------------  Total NET: 702.1 mL      06 Mar 2025 07:01  -  07 Mar 2025 07:00  --------------------------------------------------------  Total NET: 175.6 mL        CAPILLARY BLOOD GLUCOSE      POCT Blood Glucose.: 170 mg/dL (07 Mar 2025 12:23)  POCT Blood Glucose.: 193 mg/dL (07 Mar 2025 09:54)  POCT Blood Glucose.: 136 mg/dL (07 Mar 2025 05:38)  POCT Blood Glucose.: 134 mg/dL (07 Mar 2025 03:59)  POCT Blood Glucose.: 147 mg/dL (07 Mar 2025 01:40)  POCT Blood Glucose.: 148 mg/dL (06 Mar 2025 22:41)  POCT Blood Glucose.: 129 mg/dL (06 Mar 2025 17:08)    Physical Exam:  General: NAD; A&Ox3, no focal deficits clear speech   Cardiac: S1/S2, RRR, no murmur, no rubs  Lungs: unlabored respirations, CTA b/l, no wheeze, no rales, no crackles  Abdomen: Soft/NT/ND; positive bowel sounds x 4  Sternum: Intact, no click, incision healing well with no drainage  Incisions: Incisions clean/dry/intact  Extremities: No edema b/l lower extremities; good capillary refill; no cyanosis; palpable 1+ pedal pulses b/l    Central Venous Catheter: Yes[x]  No[] , If Yes indication:    critical       Day #2  Moore Catheter: Yes  [x] , No  [] , If yes indication:  strict i.o                    Day # 2  EPICARDIAL WIRES:  [x] YES [] NO                                              Day # 2  BOWEL MOVEMENT:  [] YES [x] NO, If No, Timing since last BM:      Day #  CHEST TUBE(Left/Right):  [x] YES [] NO, If yes -  AIR LEAKS:  [] YES [] NO        LABS:                        7.5[L]  10.21 )-----------( 145      ( 07 Mar 2025 02:15 )             22.0[L]                        8.2[L]  9.05  )-----------( 183      ( 06 Mar 2025 03:00 )             24.3[L]    03-07    138  |  103  |  17  ----------------------------<  132[H]  4.0   |  25  |  0.7  03-06    135  |  104  |  12  ----------------------------<  112[H]  4.9   |  23  |  0.9    Ca    7.6[L]      07 Mar 2025 02:15  Mg     2.0     03-07    TPro  5.3[L] [6.0 - 8.0]  /  Alb  3.8 [3.5 - 5.2]  /  TBili  0.9 [0.2 - 1.2]  /  DBili  x   /  AST  34 [0 - 41]  /  ALT  23 [0 - 41]  /  AlkPhos  50 [30 - 115]  03-07    PT/INR - ( 07 Mar 2025 02:15 )   PT: ;   INR: 1.68 ratio         PT/INR - ( 06 Mar 2025 03:00 )   PT: ;   INR: 1.14 ratio         PTT - ( 07 Mar 2025 02:15 )  PTT:35.4 sec, PTT - ( 06 Mar 2025 03:00 )  PTT:32.4 sec  Urinalysis Basic - ( 07 Mar 2025 02:15 )    Color: x / Appearance: x / SG: x / pH: x  Gluc: 132 mg/dL / Ketone: x  / Bili: x / Urobili: x   Blood: x / Protein: x / Nitrite: x   Leuk Esterase: x / RBC: x / WBC x   Sq Epi: x / Non Sq Epi: x / Bacteria: x      ABG - ( 06 Mar 2025 06:46 )  pH: 7.42  /  pCO2: 38    /  pO2: 165   / HCO3: 25    / Base Excess: 0.2   /  SaO2: 99.8  /  LA: 1.8              RADIOLOGY & ADDITIONAL TESTS:  CXR: < from: Xray Chest 1 View- PORTABLE-Routine (03.07.25 @ 06:14) >    EKG leads overlie thorax, obscuring anatomy.    Support devices: Satisfactory position.    Cardiac/ Mediastinum: Poststernotomy. Stable cardiac silhouette        Lungs/ Pleura: slightly increased left lung opacity/effusion. No  pneumothorax    Skeletal/ soft tissues: Stable    < end of copied text >    EKG: < from: Xray Chest 1 View- PORTABLE-Routine (03.07.25 @ 06:14) >  impression:    EKG leads overlie thorax, obscuring anatomy.    Support devices: Satisfactory position.    Cardiac/ Mediastinum: Poststernotomy. Stable cardiac silhouette        Lungs/ Pleura: slightly increased left lung opacity/effusion. No  pneumothorax    Skeletal/ soft tissues: Stable    < end of copied text >    MEDICATIONS  (STANDING):  acetaminophen   IVPB .. 1000 milliGRAM(s) IV Intermittent once  ascorbic acid 500 milliGRAM(s) Oral daily  aspirin enteric coated 81 milliGRAM(s) Oral daily  atorvastatin 80 milliGRAM(s) Oral at bedtime  bisacodyl Suppository 10 milliGRAM(s) Rectal once  chlorhexidine 2% Cloths 1 Application(s) Topical daily  dextrose 50% Injectable 50 milliLiter(s) IV Push every 15 minutes  dextrose 50% Injectable 25 milliLiter(s) IV Push every 15 minutes  folic acid 1 milliGRAM(s) Oral daily  furosemide   Injectable 20 milliGRAM(s) IV Push daily  gabapentin 300 milliGRAM(s) Oral three times a day  heparin   Injectable 5000 Unit(s) SubCutaneous every 8 hours  insulin regular Infusion 5 Unit(s)/Hr (5 mL/Hr) IV Continuous <Continuous>  iron sucrose IVPB 200 milliGRAM(s) IV Intermittent every 24 hours  metoprolol tartrate 12.5 milliGRAM(s) Oral every 12 hours  pantoprazole    Tablet 40 milliGRAM(s) Oral before breakfast  polyethylene glycol 3350 17 Gram(s) Oral daily  senna 2 Tablet(s) Oral at bedtime  sodium chloride 0.9%. 1000 milliLiter(s) (20 mL/Hr) IV Continuous <Continuous>    MEDICATIONS  (PRN):  HYDROmorphone  Injectable 0.5 milliGRAM(s) IV Push every 6 hours PRN Breakthrough Pain  melatonin 5 milliGRAM(s) Oral at bedtime PRN Insomnia  oxyCODONE    IR 5 milliGRAM(s) Oral every 4 hours PRN Moderate Pain (4 - 6)  oxyCODONE    IR 10 milliGRAM(s) Oral every 4 hours PRN Severe Pain (7 - 10)    HEPARIN:  [x] YES [] NO  Dose: 5000 UNITS Q8H  SCD's: YES b/l  GI Prophylaxis: Protonix [x], Pepcid [], None [], (Contra-indication:.....)    Post-Op Beta-Blockers: Yes [x], No[], If No, then contraindication:  Post-Op Aspirin: Yes [x],  No [], If No, then contraindication:  Post-Op Statin: Yes [x], No[], If No, then contraindication:  Allergies    No Known Allergies    Intolerances      Ambulation/Activity Status: ambulate     Assessment/Plan:  54y Male status-post CABGx4 + left radial POD#2  - Case and plan discussed with CTU Intensivist and CT Surgeon - Dr. Babcock/Nuno/Nina  - Continue CTU supportive care    - Continue DVT/GI prophylaxis  - Incentive Spirometry 10 times an hour  - Continue to advance physical activity as tolerated and continue PT/OT as directed  1. CAD: Continue ASA, statin, BB, will add plavix once chest tubes are out. ct still draining so will keep another day. pain control prn  2. HTN: cont metoprolol, when bp can tolerate will strt norvasc to prevent radial vasospasm, cont to monitor   3. A. Fib prophylaxis: cont to monitor electrolytes, bb  4. COPD/Hypoxia: cont nebs, wean o2 as tolerated, encourage incentive spirometry, lasix for noncardiogenic fluid overload   5. DM/Glucose Control: cont insulin gtt for now     Social Service Disposition:  home when stable

## 2025-03-08 LAB
ALBUMIN SERPL ELPH-MCNC: 3.6 G/DL — SIGNIFICANT CHANGE UP (ref 3.5–5.2)
ALBUMIN SERPL ELPH-MCNC: 3.7 G/DL — SIGNIFICANT CHANGE UP (ref 3.5–5.2)
ALP SERPL-CCNC: 108 U/L — SIGNIFICANT CHANGE UP (ref 30–115)
ALP SERPL-CCNC: 63 U/L — SIGNIFICANT CHANGE UP (ref 30–115)
ALT FLD-CCNC: 21 U/L — SIGNIFICANT CHANGE UP (ref 0–41)
ALT FLD-CCNC: 21 U/L — SIGNIFICANT CHANGE UP (ref 0–41)
ANION GAP SERPL CALC-SCNC: 11 MMOL/L — SIGNIFICANT CHANGE UP (ref 7–14)
ANION GAP SERPL CALC-SCNC: 7 MMOL/L — SIGNIFICANT CHANGE UP (ref 7–14)
AST SERPL-CCNC: 23 U/L — SIGNIFICANT CHANGE UP (ref 0–41)
AST SERPL-CCNC: 25 U/L — SIGNIFICANT CHANGE UP (ref 0–41)
BASOPHILS # BLD AUTO: 0.01 K/UL — SIGNIFICANT CHANGE UP (ref 0–0.2)
BASOPHILS NFR BLD AUTO: 0.1 % — SIGNIFICANT CHANGE UP (ref 0–1)
BILIRUB SERPL-MCNC: 0.7 MG/DL — SIGNIFICANT CHANGE UP (ref 0.2–1.2)
BILIRUB SERPL-MCNC: 0.9 MG/DL — SIGNIFICANT CHANGE UP (ref 0.2–1.2)
BUN SERPL-MCNC: 17 MG/DL — SIGNIFICANT CHANGE UP (ref 10–20)
BUN SERPL-MCNC: 18 MG/DL — SIGNIFICANT CHANGE UP (ref 10–20)
CALCIUM SERPL-MCNC: 7.8 MG/DL — LOW (ref 8.4–10.5)
CALCIUM SERPL-MCNC: 8.2 MG/DL — LOW (ref 8.4–10.5)
CHLORIDE SERPL-SCNC: 101 MMOL/L — SIGNIFICANT CHANGE UP (ref 98–110)
CHLORIDE SERPL-SCNC: 96 MMOL/L — LOW (ref 98–110)
CO2 SERPL-SCNC: 25 MMOL/L — SIGNIFICANT CHANGE UP (ref 17–32)
CO2 SERPL-SCNC: 29 MMOL/L — SIGNIFICANT CHANGE UP (ref 17–32)
CREAT SERPL-MCNC: 0.6 MG/DL — LOW (ref 0.7–1.5)
CREAT SERPL-MCNC: 0.8 MG/DL — SIGNIFICANT CHANGE UP (ref 0.7–1.5)
EGFR: 106 ML/MIN/1.73M2 — SIGNIFICANT CHANGE UP
EGFR: 106 ML/MIN/1.73M2 — SIGNIFICANT CHANGE UP
EGFR: 115 ML/MIN/1.73M2 — SIGNIFICANT CHANGE UP
EGFR: 115 ML/MIN/1.73M2 — SIGNIFICANT CHANGE UP
EOSINOPHIL # BLD AUTO: 0.06 K/UL — SIGNIFICANT CHANGE UP (ref 0–0.7)
EOSINOPHIL NFR BLD AUTO: 0.6 % — SIGNIFICANT CHANGE UP (ref 0–8)
GLUCOSE BLDC GLUCOMTR-MCNC: 142 MG/DL — HIGH (ref 70–99)
GLUCOSE BLDC GLUCOMTR-MCNC: 146 MG/DL — HIGH (ref 70–99)
GLUCOSE BLDC GLUCOMTR-MCNC: 159 MG/DL — HIGH (ref 70–99)
GLUCOSE SERPL-MCNC: 111 MG/DL — HIGH (ref 70–99)
GLUCOSE SERPL-MCNC: 146 MG/DL — HIGH (ref 70–99)
HCT VFR BLD CALC: 20 % — LOW (ref 42–52)
HGB BLD-MCNC: 6.8 G/DL — CRITICAL LOW (ref 14–18)
IMM GRANULOCYTES NFR BLD AUTO: 0.4 % — HIGH (ref 0.1–0.3)
LYMPHOCYTES # BLD AUTO: 1.13 K/UL — LOW (ref 1.2–3.4)
LYMPHOCYTES # BLD AUTO: 12 % — LOW (ref 20.5–51.1)
MAGNESIUM SERPL-MCNC: 2.1 MG/DL — SIGNIFICANT CHANGE UP (ref 1.8–2.4)
MCHC RBC-ENTMCNC: 26.4 PG — LOW (ref 27–31)
MCHC RBC-ENTMCNC: 34 G/DL — SIGNIFICANT CHANGE UP (ref 32–37)
MCV RBC AUTO: 77.5 FL — LOW (ref 80–94)
MONOCYTES # BLD AUTO: 0.63 K/UL — HIGH (ref 0.1–0.6)
MONOCYTES NFR BLD AUTO: 6.7 % — SIGNIFICANT CHANGE UP (ref 1.7–9.3)
NEUTROPHILS # BLD AUTO: 7.56 K/UL — HIGH (ref 1.4–6.5)
NEUTROPHILS NFR BLD AUTO: 80.2 % — HIGH (ref 42.2–75.2)
NRBC BLD AUTO-RTO: 0 /100 WBCS — SIGNIFICANT CHANGE UP (ref 0–0)
PLATELET # BLD AUTO: 153 K/UL — SIGNIFICANT CHANGE UP (ref 130–400)
PMV BLD: 9.5 FL — SIGNIFICANT CHANGE UP (ref 7.4–10.4)
POTASSIUM SERPL-MCNC: 4 MMOL/L — SIGNIFICANT CHANGE UP (ref 3.5–5)
POTASSIUM SERPL-MCNC: 4 MMOL/L — SIGNIFICANT CHANGE UP (ref 3.5–5)
POTASSIUM SERPL-SCNC: 4 MMOL/L — SIGNIFICANT CHANGE UP (ref 3.5–5)
POTASSIUM SERPL-SCNC: 4 MMOL/L — SIGNIFICANT CHANGE UP (ref 3.5–5)
PROT SERPL-MCNC: 5.3 G/DL — LOW (ref 6–8)
PROT SERPL-MCNC: 6 G/DL — SIGNIFICANT CHANGE UP (ref 6–8)
RBC # BLD: 2.58 M/UL — LOW (ref 4.7–6.1)
RBC # FLD: 15.7 % — HIGH (ref 11.5–14.5)
SODIUM SERPL-SCNC: 132 MMOL/L — LOW (ref 135–146)
SODIUM SERPL-SCNC: 137 MMOL/L — SIGNIFICANT CHANGE UP (ref 135–146)
WBC # BLD: 9.43 K/UL — SIGNIFICANT CHANGE UP (ref 4.8–10.8)
WBC # FLD AUTO: 9.43 K/UL — SIGNIFICANT CHANGE UP (ref 4.8–10.8)

## 2025-03-08 PROCEDURE — 71045 X-RAY EXAM CHEST 1 VIEW: CPT | Mod: 26

## 2025-03-08 PROCEDURE — 99291 CRITICAL CARE FIRST HOUR: CPT

## 2025-03-08 PROCEDURE — 93010 ELECTROCARDIOGRAM REPORT: CPT

## 2025-03-08 PROCEDURE — 99232 SBSQ HOSP IP/OBS MODERATE 35: CPT | Mod: 24

## 2025-03-08 RX ORDER — METOPROLOL SUCCINATE 50 MG/1
25 TABLET, EXTENDED RELEASE ORAL EVERY 8 HOURS
Refills: 0 | Status: DISCONTINUED | OUTPATIENT
Start: 2025-03-08 | End: 2025-03-10

## 2025-03-08 RX ORDER — DILTIAZEM HYDROCHLORIDE 240 MG/1
30 TABLET, EXTENDED RELEASE ORAL EVERY 8 HOURS
Refills: 0 | Status: DISCONTINUED | OUTPATIENT
Start: 2025-03-08 | End: 2025-03-08

## 2025-03-08 RX ORDER — TAMSULOSIN HYDROCHLORIDE 0.4 MG/1
0.4 CAPSULE ORAL AT BEDTIME
Refills: 0 | Status: DISCONTINUED | OUTPATIENT
Start: 2025-03-08 | End: 2025-03-14

## 2025-03-08 RX ORDER — DILTIAZEM HYDROCHLORIDE 240 MG/1
30 TABLET, EXTENDED RELEASE ORAL EVERY 8 HOURS
Refills: 0 | Status: DISCONTINUED | OUTPATIENT
Start: 2025-03-08 | End: 2025-03-10

## 2025-03-08 RX ADMIN — Medication 500 MICROGRAM(S): at 19:36

## 2025-03-08 RX ADMIN — FOLIC ACID 1 MILLIGRAM(S): 1 TABLET ORAL at 12:55

## 2025-03-08 RX ADMIN — METOPROLOL SUCCINATE 25 MILLIGRAM(S): 50 TABLET, EXTENDED RELEASE ORAL at 17:10

## 2025-03-08 RX ADMIN — TAMSULOSIN HYDROCHLORIDE 0.4 MILLIGRAM(S): 0.4 CAPSULE ORAL at 22:08

## 2025-03-08 RX ADMIN — OXYCODONE HYDROCHLORIDE 5 MILLIGRAM(S): 30 TABLET ORAL at 06:55

## 2025-03-08 RX ADMIN — OXYCODONE HYDROCHLORIDE 10 MILLIGRAM(S): 30 TABLET ORAL at 14:51

## 2025-03-08 RX ADMIN — Medication 2 TABLET(S): at 22:08

## 2025-03-08 RX ADMIN — Medication 500 MILLIGRAM(S): at 12:54

## 2025-03-08 RX ADMIN — METOPROLOL SUCCINATE 25 MILLIGRAM(S): 50 TABLET, EXTENDED RELEASE ORAL at 05:38

## 2025-03-08 RX ADMIN — Medication 40 MILLIGRAM(S): at 05:39

## 2025-03-08 RX ADMIN — POLYETHYLENE GLYCOL 3350 17 GRAM(S): 17 POWDER, FOR SOLUTION ORAL at 12:54

## 2025-03-08 RX ADMIN — Medication 81 MILLIGRAM(S): at 12:55

## 2025-03-08 RX ADMIN — Medication 5 MILLIGRAM(S): at 22:09

## 2025-03-08 RX ADMIN — DILTIAZEM HYDROCHLORIDE 30 MILLIGRAM(S): 240 TABLET, EXTENDED RELEASE ORAL at 22:09

## 2025-03-08 RX ADMIN — GABAPENTIN 300 MILLIGRAM(S): 400 CAPSULE ORAL at 05:38

## 2025-03-08 RX ADMIN — FUROSEMIDE 20 MILLIGRAM(S): 10 INJECTION INTRAMUSCULAR; INTRAVENOUS at 05:37

## 2025-03-08 RX ADMIN — INSULIN LISPRO 1: 100 INJECTION, SOLUTION INTRAVENOUS; SUBCUTANEOUS at 12:52

## 2025-03-08 RX ADMIN — HEPARIN SODIUM 5000 UNIT(S): 1000 INJECTION INTRAVENOUS; SUBCUTANEOUS at 13:25

## 2025-03-08 RX ADMIN — METOPROLOL SUCCINATE 25 MILLIGRAM(S): 50 TABLET, EXTENDED RELEASE ORAL at 22:08

## 2025-03-08 RX ADMIN — GABAPENTIN 300 MILLIGRAM(S): 400 CAPSULE ORAL at 22:08

## 2025-03-08 RX ADMIN — Medication 1 APPLICATION(S): at 05:37

## 2025-03-08 RX ADMIN — IRON SUCROSE 100 MILLIGRAM(S): 20 INJECTION, SOLUTION INTRAVENOUS at 14:21

## 2025-03-08 RX ADMIN — OXYCODONE HYDROCHLORIDE 10 MILLIGRAM(S): 30 TABLET ORAL at 14:21

## 2025-03-08 RX ADMIN — ATORVASTATIN CALCIUM 80 MILLIGRAM(S): 80 TABLET, FILM COATED ORAL at 22:09

## 2025-03-08 RX ADMIN — Medication 500 MICROGRAM(S): at 14:36

## 2025-03-08 RX ADMIN — DILTIAZEM HYDROCHLORIDE 30 MILLIGRAM(S): 240 TABLET, EXTENDED RELEASE ORAL at 12:55

## 2025-03-08 RX ADMIN — GABAPENTIN 300 MILLIGRAM(S): 400 CAPSULE ORAL at 13:25

## 2025-03-08 RX ADMIN — HEPARIN SODIUM 5000 UNIT(S): 1000 INJECTION INTRAVENOUS; SUBCUTANEOUS at 22:09

## 2025-03-08 RX ADMIN — OXYCODONE HYDROCHLORIDE 5 MILLIGRAM(S): 30 TABLET ORAL at 05:55

## 2025-03-08 RX ADMIN — HEPARIN SODIUM 5000 UNIT(S): 1000 INJECTION INTRAVENOUS; SUBCUTANEOUS at 05:37

## 2025-03-08 RX ADMIN — OXYCODONE HYDROCHLORIDE 5 MILLIGRAM(S): 30 TABLET ORAL at 00:35

## 2025-03-08 NOTE — PROGRESS NOTE ADULT - ASSESSMENT
Assessment   s/p CABG POD 3 off pressors      Plan:    Neuro:  Multimodal pain management  Tylenol, Lidoderm patch, gabapentin, opiates as needed  Monitor neuro status in the post op period    CV:  S/P CABG:  ASA  HTN on lopressor   Monitor perfusion, , lactate, UOP  Maintain MAP > 65  diuresis  Remove chest tubes     Resp:  L lung atelactasis - no effusion on POCUS  Supplemental oxygen to maintain sats > 92%  Continuous pulse oximetry monitoring  IS / Chest PT  Nebulizers as needed  Flutter valve  Nebulizers    GI:  Advance diet - cardiac  Bowel Regimen - escalate as needed  PUD ppx per protocol    Renal  High risk for TERENCE post surgery  Monitor UOP, lytes, creatinine  Start lasix today - goal negative 1L   Keep K > 4, Mg > 2    Endo:  Tight glucose control per CTICU protocol  Insulin gtt as needed  Transition to Lantus / SSI and premeal insulin as needed    Heme:  Acute blood loss anemia post surgery  DVT ppx    ID:  Perioperative prophylactic abx per protocol  Monitor fever curve and WBC count      Upon my evaluation, the above patient has a high probability of imminent or life threatening deterioration due to a high risk for Shock, Cardiogenic shock, arrythmia, acute blood loss, acute kidney injury and acute pulmonary insufficiency which required my direct attention, intervention, and personal management.  Total critical care time indicated in the note includes review of radiology results, ordering, interpreting and reviewing diagnostic studies / lab tests with immediate assessment and treatment, consultant collaboration on findings and treatment options, monitoring for potential decompensation, obtaining history from family, EMT, nursing home staff and/or treating physicians; directing the titration of pressors, oxygen support devices, fluid resuscitation, interpretation of cardiac output measurements, interpretation of radiological assessments, interpretation of EKG / rhythm strips, telemetry.  This time did not overlap with the management of other patients or performing separately reportable procedures.      Management of this patient was discussed with the CT Surgery/Thoracic surgery/Structural Cardiology attending and mid-level providers.    I acknowledge the use of copied documentation.  All copy forward documentation is my own and has been reviewed and revised as appropriate.  If no changes were made, it is because that information remains the same.

## 2025-03-08 NOTE — PROGRESS NOTE ADULT - SUBJECTIVE AND OBJECTIVE BOX
CTU Attending Progress Daily Note       Procedure: CABG  POD#: 3    He has history of HLD (hyperlipidemia)  Hypertriglyceridemia  DM (diabetes mellitus)  CAD (coronary artery disease)  HTN (hypertension)      HPI:  Mr. BYRON NAJERA is a 54 year M with PMhx  includes DLD, HTN, CAD. Mr. NAJERA was worked up for complaints SOB on exertion. He  had a cardiac catheterization which revealed multivessel disease. On 03/05/25, he underwent surgical myocardial revascularization.    Hospital Course:  3/5 CABG 4 LIMA + Lt Radial  OR C2, Alb1, ,      on Levo and Vaso: Albumin 500 given  post-extubation hypercapnia on BIPAP  chest tube 300ml: Hb 7.7,              1 PRBCs 1 Platelets  3/6 Stable hemodynamics, off pressors, start BB  3/7 started diuresis  3/8 Diuresis, Hb 6.8 - transfuse only if symptomatic    OBJECTIVE:  ICU Vital Signs Last 24 Hrs  T(C): 37 (08 Mar 2025 00:00), Max: 37.2 (07 Mar 2025 12:00)  T(F): 98.6 (08 Mar 2025 00:00), Max: 98.9 (07 Mar 2025 12:00)  HR: 93 (08 Mar 2025 09:00) (89 - 107)  BP: --  BP(mean): --  ABP: 104/54 (08 Mar 2025 09:00) (101/55 - 155/69)  ABP(mean): 70 (08 Mar 2025 09:00) (69 - 95)  RR: 17 (08 Mar 2025 09:00) (11 - 36)  SpO2: 100% (08 Mar 2025 09:00) (97% - 100%)    O2 Parameters below as of 08 Mar 2025 08:00  Patient On (Oxygen Delivery Method): nasal cannula  O2 Flow (L/min): 2        I&O's Summary    07 Mar 2025 07:01  -  08 Mar 2025 07:00  --------------------------------------------------------  IN: 304 mL / OUT: 1827 mL / NET: -1523 mL    08 Mar 2025 06:01  -  08 Mar 2025 11:28  --------------------------------------------------------  IN: 0 mL / OUT: 0 mL / NET: 0 mL      I&O's Detail    07 Mar 2025 07:01  -  08 Mar 2025 07:00  --------------------------------------------------------  IN:    Insulin: 24 mL    Oral Fluid: 120 mL    sodium chloride 0.9%: 160 mL  Total IN: 304 mL    OUT:    Chest Tube (mL): 200 mL    Chest Tube (mL): 102 mL    Indwelling Catheter - Urethral (mL): 1525 mL  Total OUT: 1827 mL    Total NET: -1523 mL      08 Mar 2025 06:01  -  08 Mar 2025 11:28  --------------------------------------------------------  IN:  Total IN: 0 mL    OUT:    Chest Tube (mL): 0 mL    Chest Tube (mL): 0 mL  Total OUT: 0 mL    Total NET: 0 mL    CAPILLARY BLOOD GLUCOSE  POCT Blood Glucose.: 135 mg/dL (07 Mar 2025 21:40)  POCT Blood Glucose.: 108 mg/dL (07 Mar 2025 16:48)  POCT Blood Glucose.: 170 mg/dL (07 Mar 2025 12:23)    LABS:                          6.8    9.43  )-----------( 153      ( 08 Mar 2025 01:32 )             20.0     03-08    137  |  101  |  17  ----------------------------<  111[H]  4.0   |  25  |  0.6[L]    Ca    7.8[L]      08 Mar 2025 01:32  Mg     2.1     03-08    TPro  5.3[L]  /  Alb  3.6  /  TBili  0.9  /  DBili  x   /  AST  25  /  ALT  21  /  AlkPhos  63  03-08    PT/INR - ( 07 Mar 2025 02:15 )   PT: 20.00 sec;   INR: 1.68 ratio         PTT - ( 07 Mar 2025 02:15 )  PTT:35.4 sec  Urinalysis Basic - ( 08 Mar 2025 01:32 )    Color: x / Appearance: x / SG: x / pH: x  Gluc: 111 mg/dL / Ketone: x  / Bili: x / Urobili: x   Blood: x / Protein: x / Nitrite: x   Leuk Esterase: x / RBC: x / WBC x   Sq Epi: x / Non Sq Epi: x / Bacteria: x        Home Medications:  aspirin 81 mg oral delayed release capsule: orally once a day (05 Mar 2025 06:53)  atorvastatin 80 mg oral tablet: 1 tab(s) orally once a day (05 Mar 2025 06:53)  icosapent 1 g oral capsule: 2 cap(s) orally once a day (05 Mar 2025 06:53)  losartan 25 mg oral tablet: 1 tab(s) orally once a day (05 Mar 2025 06:53)  metFORMIN 500 mg oral tablet: 1 tab(s) orally 2 times a day (05 Mar 2025 06:53)  metoprolol succinate 25 mg oral tablet, extended release: 1 tab(s) orally once a day (05 Mar 2025 06:53)  Ozempic 2 mg/1.5 mL (1 mg dose) subcutaneous solution: subcutaneous once a week (05 Mar 2025 06:53)    HOSPITAL MEDICATIONS:  MEDICATIONS  (STANDING):  ascorbic acid 500 milliGRAM(s) Oral daily  aspirin enteric coated 81 milliGRAM(s) Oral daily  atorvastatin 80 milliGRAM(s) Oral at bedtime  bisacodyl Suppository 10 milliGRAM(s) Rectal once  chlorhexidine 2% Cloths 1 Application(s) Topical daily  dextrose 5%. 1000 milliLiter(s) (50 mL/Hr) IV Continuous <Continuous>  dextrose 5%. 1000 milliLiter(s) (100 mL/Hr) IV Continuous <Continuous>  dextrose 50% Injectable 50 milliLiter(s) IV Push every 15 minutes  dextrose 50% Injectable 25 milliLiter(s) IV Push every 15 minutes  folic acid 1 milliGRAM(s) Oral daily  furosemide   Injectable 20 milliGRAM(s) IV Push daily  gabapentin 300 milliGRAM(s) Oral three times a day  glucagon  Injectable 1 milliGRAM(s) IntraMuscular once  heparin   Injectable 5000 Unit(s) SubCutaneous every 8 hours  insulin lispro (ADMELOG) corrective regimen sliding scale   SubCutaneous three times a day before meals  insulin lispro (ADMELOG) corrective regimen sliding scale   SubCutaneous at bedtime  ipratropium    for Nebulization 500 MICROGram(s) Nebulizer every 6 hours  iron sucrose IVPB 200 milliGRAM(s) IV Intermittent every 24 hours  melatonin 5 milliGRAM(s) Oral at bedtime  metoprolol tartrate 25 milliGRAM(s) Oral two times a day  pantoprazole    Tablet 40 milliGRAM(s) Oral before breakfast  polyethylene glycol 3350 17 Gram(s) Oral daily  senna 2 Tablet(s) Oral at bedtime  sodium chloride 0.9%. 1000 milliLiter(s) (20 mL/Hr) IV Continuous <Continuous>    MEDICATIONS  (PRN):  dextrose Oral Gel 15 Gram(s) Oral once PRN Blood Glucose LESS THAN 70 milliGRAM(s)/deciliter  melatonin 5 milliGRAM(s) Oral at bedtime PRN Insomnia  oxyCODONE    IR 5 milliGRAM(s) Oral every 4 hours PRN Moderate Pain (4 - 6)  oxyCODONE    IR 10 milliGRAM(s) Oral every 4 hours PRN Severe Pain (7 - 10)    RADIOLOGY:  Chest X-ray Reviewed    REVIEW OF SYSTEMS:  CONSTITUTIONAL: [X] all negative; [ ] weakness, [ ] fevers, [ ] chills  EYES/ENT: [X] all negative; [ ] visual changes, [ ] vertigo, [ ] throat pain   NECK: [X] all negative; [ ] pain, [ ] stiffness  RESPIRATORY: [x] all negative, [ ] cough, [ ] wheezing, [ ] hemoptysis, [ ] shortness of breath  CARDIOVASCULAR: [x] all negative; [ ] chest pain, [ ] palpitations, [ ] orthopnea  GASTROINTESTINAL: [X] all negative; [ ]abdominal pain, [ ] nausea, [ ] vomiting, [ ] hematemesis, [ ] diarrhea, [ ] constipation, [ ] melena, [ ] hematochezia.  GENITOURINARY: [X] all negative; [ ] dysuria, [ ] frequency, [ ] hematuria  NEUROLOGICAL: [X] all negative; [ ] numbness, [ ] weakness  SKIN: [X] all negative; [ ] itching, [ ] burning, [ ] rashes, [ ] lesions   All other review of systems is negative unless indicated above.  [  ] Unable to assess ROS because       PHYSICAL EXAM:          CONSTITUTIONAL: Well-developed; well-nourished; in no acute distress.   	SKIN: warm, dry  	HEAD: Normocephalic; atraumatic.  	EYES: PERRL, EOM, no conj injection, sclera clear  	ENT: No nasal discharge; airway clear.  	NECK: Supple; non tender.   	CARD: S1, S2 normal; no murmurs, gallops, or rubs. Regular rate and rhythm.  no carotid bruits  	RESP: CTA B/L; good air movement No wheezes, rales or rhonchi.  	ABD: Soft, not tender, not distended,  no rebound or guarding, bowel sounds present  	EXT: Normal ROM.  No clubbing, cyanosis or edema.   warm and well perfused.  pulses palpable  	NEURO: Alert, awake, motor 5/5 R, 5/5 L

## 2025-03-08 NOTE — PROGRESS NOTE ADULT - SUBJECTIVE AND OBJECTIVE BOX
OPERATIVE PROCEDURE(s): CABG x4               POD #                       SURGEON(s): YARED Babcock      SUBJECTIVE ASSESSMENT:54yMale patient seen and examined at bedside.    Vital Signs Last 24 Hrs  T(F): 98.6 (08 Mar 2025 00:00), Max: 98.9 (07 Mar 2025 12:00)  HR: 93 (08 Mar 2025 09:00) (89 - 107)  BP: 114/70 (07 Mar 2025 11:00) (114/70 - 114/70)  BP(mean): 87 (07 Mar 2025 11:00) (87 - 87)  ABP: 104/54 (08 Mar 2025 09:00) (101/55 - 155/69)  ABP(mean): 70 (08 Mar 2025 09:00)  RR: 17 (08 Mar 2025 09:00) (11 - 36)  SpO2: 100% (08 Mar 2025 09:00) (97% - 100%)      I&O's Detail    07 Mar 2025 07:01  -  08 Mar 2025 07:00  --------------------------------------------------------  IN:    Insulin: 24 mL    Oral Fluid: 120 mL    sodium chloride 0.9%: 160 mL  Total IN: 304 mL    OUT:    Chest Tube (mL): 200 mL    Chest Tube (mL): 102 mL    Indwelling Catheter - Urethral (mL): 1525 mL  Total OUT: 1827 mL        Net: I&O's Detail    06 Mar 2025 07:01  -  07 Mar 2025 07:00  --------------------------------------------------------  Total NET: 175.6 mL      07 Mar 2025 07:01  -  08 Mar 2025 07:00  --------------------------------------------------------  Total NET: -1523 mL        CAPILLARY BLOOD GLUCOSE      POCT Blood Glucose.: 135 mg/dL (07 Mar 2025 21:40)  POCT Blood Glucose.: 108 mg/dL (07 Mar 2025 16:48)  POCT Blood Glucose.: 170 mg/dL (07 Mar 2025 12:23)    A1C with Estimated Average Glucose Result: 6.6 % (02-28-25 @ 07:39)      Physical Exam:  General: NAD; A&Ox3  Neuro: pupils equal and reactive, speech clear, no overt sensory or motor deficts  Cardiac: S1/S2, RRR, no murmur, no rubs  Lungs: unlabored respirations, CTA b/l, no wheeze, no rales, no crackles  Abdomen: Soft/NT/ND; positive bowel sounds x 4  Sternum: Intact, no click, incision healing well with no drainage  Incisions: Incisions clean/dry/intact  Extremities: No edema b/l lower extremities; good capillary refill; no cyanosis; palpable 1+ pedal pulses b/l    Central Venous Catheter: Yes: critical illness, intravenous access  Day #  Moore Catheter: Yes: critical illness; monitor strict i/o's                    Day #  EPICARDIAL WIRES:  YES                                                                         Day #  BOWEL MOVEMENT:  [] YES [] NO, If No, Timing since last BM Day #  CHEST TUBE(MS/Left/Right):  [] YES [] NO, If yes -  AIR LEAKS:  YES/NO        LABS:                        6.8[LL]  9.43  )-----------( 153      ( 08 Mar 2025 01:32 )             20.0[L]                        7.5[L]  10.21 )-----------( 145      ( 07 Mar 2025 02:15 )             22.0[L]    03-08    137  |  101  |  17  ----------------------------<  111[H]  4.0   |  25  |  0.6[L]  03-07    138  |  103  |  17  ----------------------------<  132[H]  4.0   |  25  |  0.7    Ca    7.8[L]      08 Mar 2025 01:32  Mg     2.1     03-08    TPro  5.3[L] [6.0 - 8.0]  /  Alb  3.6 [3.5 - 5.2]  /  TBili  0.9 [0.2 - 1.2]  /  DBili  x   /  AST  25 [0 - 41]  /  ALT  21 [0 - 41]  /  AlkPhos  63 [30 - 115]  03-08    PT/INR - ( 07 Mar 2025 02:15 )   PT: ;   INR: 1.68 ratio         PTT - ( 07 Mar 2025 02:15 )  PTT:35.4 sec          RADIOLOGY & ADDITIONAL TESTS:  CXR:   EKG:    Allergies    No Known Allergies    Intolerances      MEDICATIONS  (STANDING):  ascorbic acid 500 milliGRAM(s) Oral daily  aspirin enteric coated 81 milliGRAM(s) Oral daily  atorvastatin 80 milliGRAM(s) Oral at bedtime  bisacodyl Suppository 10 milliGRAM(s) Rectal once  chlorhexidine 2% Cloths 1 Application(s) Topical daily  dextrose 5%. 1000 milliLiter(s) (50 mL/Hr) IV Continuous <Continuous>  dextrose 5%. 1000 milliLiter(s) (100 mL/Hr) IV Continuous <Continuous>  dextrose 50% Injectable 50 milliLiter(s) IV Push every 15 minutes  dextrose 50% Injectable 25 milliLiter(s) IV Push every 15 minutes  folic acid 1 milliGRAM(s) Oral daily  furosemide   Injectable 20 milliGRAM(s) IV Push daily  gabapentin 300 milliGRAM(s) Oral three times a day  glucagon  Injectable 1 milliGRAM(s) IntraMuscular once  heparin   Injectable 5000 Unit(s) SubCutaneous every 8 hours  insulin lispro (ADMELOG) corrective regimen sliding scale   SubCutaneous three times a day before meals  insulin lispro (ADMELOG) corrective regimen sliding scale   SubCutaneous at bedtime  iron sucrose IVPB 200 milliGRAM(s) IV Intermittent every 24 hours  melatonin 5 milliGRAM(s) Oral at bedtime  metoprolol tartrate 25 milliGRAM(s) Oral two times a day  pantoprazole    Tablet 40 milliGRAM(s) Oral before breakfast  polyethylene glycol 3350 17 Gram(s) Oral daily  senna 2 Tablet(s) Oral at bedtime  sodium chloride 0.9%. 1000 milliLiter(s) (20 mL/Hr) IV Continuous <Continuous>    MEDICATIONS  (PRN):  dextrose Oral Gel 15 Gram(s) Oral once PRN Blood Glucose LESS THAN 70 milliGRAM(s)/deciliter  melatonin 5 milliGRAM(s) Oral at bedtime PRN Insomnia  oxyCODONE    IR 5 milliGRAM(s) Oral every 4 hours PRN Moderate Pain (4 - 6)  oxyCODONE    IR 10 milliGRAM(s) Oral every 4 hours PRN Severe Pain (7 - 10)    Home Medications:  aspirin 81 mg oral delayed release capsule: orally once a day (05 Mar 2025 06:53)  atorvastatin 80 mg oral tablet: 1 tab(s) orally once a day (05 Mar 2025 06:53)  icosapent 1 g oral capsule: 2 cap(s) orally once a day (05 Mar 2025 06:53)  losartan 25 mg oral tablet: 1 tab(s) orally once a day (05 Mar 2025 06:53)  metFORMIN 500 mg oral tablet: 1 tab(s) orally 2 times a day (05 Mar 2025 06:53)  metoprolol succinate 25 mg oral tablet, extended release: 1 tab(s) orally once a day (05 Mar 2025 06:53)  Ozempic 2 mg/1.5 mL (1 mg dose) subcutaneous solution: subcutaneous once a week (05 Mar 2025 06:53)      Pharmacologic DVT Prophylaxis [] YES, [] NO: Contraindication:  SCD's: YES b/l    GI Prophylaxis:     Post-Op Beta-Blockers: [] Yes, [] No: contraindication:  Post-Op CCB: [] Yes, [] No: contraindication:  Post-Op Aspirin:  [] Yes, [] No: contraindication:  Post-Op Statin:  [] Yes, [] No: contraindication:    Ambulation/Activity Status:    Assessment/Plan:  54y Male status-post  - Case and plan discussed with CTU Intensivist and CT Surgeon - Dr. Babcock/Nuno/Nina/[place]  - Continue CTU supportive care and ongoing plan of care as per continuing CTU rounds.   - Continue DVT/GI prophylaxis  - Incentive Spirometry 10 times an hour  - Continue to advance physical activity as tolerated and continue PT/OT as directed  1. CAD s/p CABG: Continue ASA, statin, BB  2. HTN:   3. Post-op A. Fib ppx:   4. COPD/Hypoxia:   5. DM/Glucose Control:     Social Service Disposition:     OPERATIVE PROCEDURE(s): CABG x4 with L radial             POD #  3   SURGEON(s): YARED Babcock      SUBJECTIVE ASSESSMENT:54yMale patient seen and examined at bedside. C/o incisional pain.    Vital Signs Last 24 Hrs  T(F): 98.6 (08 Mar 2025 00:00), Max: 98.9 (07 Mar 2025 12:00)  HR: 93 (08 Mar 2025 09:00) (89 - 107)  BP: 114/70 (07 Mar 2025 11:00) (114/70 - 114/70)  BP(mean): 87 (07 Mar 2025 11:00) (87 - 87)  ABP: 104/54 (08 Mar 2025 09:00) (101/55 - 155/69)  ABP(mean): 70 (08 Mar 2025 09:00)  RR: 17 (08 Mar 2025 09:00) (11 - 36)  SpO2: 100% (08 Mar 2025 09:00) (97% - 100%)      I&O's Detail    07 Mar 2025 07:01  -  08 Mar 2025 07:00  --------------------------------------------------------  IN:    Insulin: 24 mL    Oral Fluid: 120 mL    sodium chloride 0.9%: 160 mL  Total IN: 304 mL    OUT:    Chest Tube (mL): 200 mL    Chest Tube (mL): 102 mL    Indwelling Catheter - Urethral (mL): 1525 mL  Total OUT: 1827 mL        Net: I&O's Detail    06 Mar 2025 07:01  -  07 Mar 2025 07:00  --------------------------------------------------------  Total NET: 175.6 mL      07 Mar 2025 07:01  -  08 Mar 2025 07:00  --------------------------------------------------------  Total NET: -1523 mL        CAPILLARY BLOOD GLUCOSE      POCT Blood Glucose.: 135 mg/dL (07 Mar 2025 21:40)  POCT Blood Glucose.: 108 mg/dL (07 Mar 2025 16:48)  POCT Blood Glucose.: 170 mg/dL (07 Mar 2025 12:23)    A1C with Estimated Average Glucose Result: 6.6 % (02-28-25 @ 07:39)      Physical Exam:  General: NAD; A&Ox3  Neuro: pupils equal and reactive, speech clear, no overt sensory or motor deficts  Cardiac: S1/S2, RRR, no murmur, no rubs  Lungs: unlabored respirations, CTA b/l, no wheeze, no rales, no crackles  Abdomen: Soft/NT/ND; positive bowel sounds x 4  Sternum: Intact, no click, incision healing well with no drainage  Incisions: Incisions clean/dry/intact  Extremities: No edema b/l lower extremities; good capillary refill; no cyanosis; palpable 1+ pedal pulses b/l    Central Venous Catheter: Yes: critical illness, intravenous access  Day #  Guerra Catheter: Yes: critical illness; monitor strict i/o's                    Day #  EPICARDIAL WIRES:  YES                                                                         Day #  BOWEL MOVEMENT:  [] YES [] NO, If No, Timing since last BM Day #  CHEST TUBE(MS/Left/Right):  [] YES [] NO, If yes -  AIR LEAKS:  YES/NO        LABS:                        6.8[LL]  9.43  )-----------( 153      ( 08 Mar 2025 01:32 )             20.0[L]                        7.5[L]  10.21 )-----------( 145      ( 07 Mar 2025 02:15 )             22.0[L]    03-08    137  |  101  |  17  ----------------------------<  111[H]  4.0   |  25  |  0.6[L]  03-07    138  |  103  |  17  ----------------------------<  132[H]  4.0   |  25  |  0.7    Ca    7.8[L]      08 Mar 2025 01:32  Mg     2.1     03-08    TPro  5.3[L] [6.0 - 8.0]  /  Alb  3.6 [3.5 - 5.2]  /  TBili  0.9 [0.2 - 1.2]  /  DBili  x   /  AST  25 [0 - 41]  /  ALT  21 [0 - 41]  /  AlkPhos  63 [30 - 115]  03-08    PT/INR - ( 07 Mar 2025 02:15 )   PT: ;   INR: 1.68 ratio         PTT - ( 07 Mar 2025 02:15 )  PTT:35.4 sec          RADIOLOGY & ADDITIONAL TESTS:  CXR:   EKG:    Allergies    No Known Allergies    Intolerances      MEDICATIONS  (STANDING):  ascorbic acid 500 milliGRAM(s) Oral daily  aspirin enteric coated 81 milliGRAM(s) Oral daily  atorvastatin 80 milliGRAM(s) Oral at bedtime  bisacodyl Suppository 10 milliGRAM(s) Rectal once  chlorhexidine 2% Cloths 1 Application(s) Topical daily  dextrose 5%. 1000 milliLiter(s) (50 mL/Hr) IV Continuous <Continuous>  dextrose 5%. 1000 milliLiter(s) (100 mL/Hr) IV Continuous <Continuous>  dextrose 50% Injectable 50 milliLiter(s) IV Push every 15 minutes  dextrose 50% Injectable 25 milliLiter(s) IV Push every 15 minutes  folic acid 1 milliGRAM(s) Oral daily  furosemide   Injectable 20 milliGRAM(s) IV Push daily  gabapentin 300 milliGRAM(s) Oral three times a day  glucagon  Injectable 1 milliGRAM(s) IntraMuscular once  heparin   Injectable 5000 Unit(s) SubCutaneous every 8 hours  insulin lispro (ADMELOG) corrective regimen sliding scale   SubCutaneous three times a day before meals  insulin lispro (ADMELOG) corrective regimen sliding scale   SubCutaneous at bedtime  iron sucrose IVPB 200 milliGRAM(s) IV Intermittent every 24 hours  melatonin 5 milliGRAM(s) Oral at bedtime  metoprolol tartrate 25 milliGRAM(s) Oral two times a day  pantoprazole    Tablet 40 milliGRAM(s) Oral before breakfast  polyethylene glycol 3350 17 Gram(s) Oral daily  senna 2 Tablet(s) Oral at bedtime  sodium chloride 0.9%. 1000 milliLiter(s) (20 mL/Hr) IV Continuous <Continuous>    MEDICATIONS  (PRN):  dextrose Oral Gel 15 Gram(s) Oral once PRN Blood Glucose LESS THAN 70 milliGRAM(s)/deciliter  melatonin 5 milliGRAM(s) Oral at bedtime PRN Insomnia  oxyCODONE    IR 5 milliGRAM(s) Oral every 4 hours PRN Moderate Pain (4 - 6)  oxyCODONE    IR 10 milliGRAM(s) Oral every 4 hours PRN Severe Pain (7 - 10)    Home Medications:  aspirin 81 mg oral delayed release capsule: orally once a day (05 Mar 2025 06:53)  atorvastatin 80 mg oral tablet: 1 tab(s) orally once a day (05 Mar 2025 06:53)  icosapent 1 g oral capsule: 2 cap(s) orally once a day (05 Mar 2025 06:53)  losartan 25 mg oral tablet: 1 tab(s) orally once a day (05 Mar 2025 06:53)  metFORMIN 500 mg oral tablet: 1 tab(s) orally 2 times a day (05 Mar 2025 06:53)  metoprolol succinate 25 mg oral tablet, extended release: 1 tab(s) orally once a day (05 Mar 2025 06:53)  Ozempic 2 mg/1.5 mL (1 mg dose) subcutaneous solution: subcutaneous once a week (05 Mar 2025 06:53)      Pharmacologic DVT Prophylaxis [X] YES, [] NO: Contraindication:  Heparin 5000 units q8h  SCD's: YES b/l    GI Prophylaxis: Protonix    Post-Op Beta-Blockers: [X] Yes, [] No: contraindication:  Post-Op Aspirin:  [X] Yes, [] No: contraindication:  Post-Op Statin:  [X] Yes, [] No: contraindication:    Ambulation/Activity Status: Ambulate with assistance    Assessment/Plan:  54y Male POD#3 s/p CABGx4 with L radial  - Case and plan discussed with CTU Intensivist and CT Surgeon - Dr. Babcock  - Continue CTU supportive care and ongoing plan of care as per continuing CTU rounds.   - Continue DVT/GI prophylaxis  - Incentive Spirometry 10 times an hour  - Continue to advance physical activity as tolerated and continue PT/OT as directed  1. CAD s/p CABG: Continue ASA, statin, BB. Continue diuresis with Lasix 20mg qd  2. HTN: Continue metoprolol. Add norvasc to prevent radial artery spasm   3. Post-op A. Fib ppx: monitor electrolytes, leave wires.   4. COPD/Hypoxia/Atelectasis on CXR: supplemental o2 prn, encourage incentive spirometry, start Ipratropium  5. DM/Glucose Control (A1c 6.6): insulin sliding scale  6. Anemia 2/2 acute blood loss, pt asx: monitor H/H, active T&S, continue folic acid and IV iron  7. D/c guerra and TOV today. D/c mediastinal and L pleural chest tube today. F/u XR and start plavix after CTs removed.    Social Service Disposition:  pt to assess   OPERATIVE PROCEDURE(s): CABG x4 with L radial             POD #  3   SURGEON(s): YARED Babcock      SUBJECTIVE ASSESSMENT:54yMale patient seen and examined at bedside. C/o incisional pain.    Vital Signs Last 24 Hrs  T(F): 98.6 (08 Mar 2025 00:00), Max: 98.9 (07 Mar 2025 12:00)  HR: 93 (08 Mar 2025 09:00) (89 - 107)  BP: 114/70 (07 Mar 2025 11:00) (114/70 - 114/70)  BP(mean): 87 (07 Mar 2025 11:00) (87 - 87)  ABP: 104/54 (08 Mar 2025 09:00) (101/55 - 155/69)  ABP(mean): 70 (08 Mar 2025 09:00)  RR: 17 (08 Mar 2025 09:00) (11 - 36)  SpO2: 100% (08 Mar 2025 09:00) (97% - 100%)      I&O's Detail    07 Mar 2025 07:01  -  08 Mar 2025 07:00  --------------------------------------------------------  IN:    Insulin: 24 mL    Oral Fluid: 120 mL    sodium chloride 0.9%: 160 mL  Total IN: 304 mL    OUT:    Chest Tube (mL): 200 mL    Chest Tube (mL): 102 mL    Indwelling Catheter - Urethral (mL): 1525 mL  Total OUT: 1827 mL        Net: I&O's Detail    06 Mar 2025 07:01  -  07 Mar 2025 07:00  --------------------------------------------------------  Total NET: 175.6 mL      07 Mar 2025 07:01  -  08 Mar 2025 07:00  --------------------------------------------------------  Total NET: -1523 mL        CAPILLARY BLOOD GLUCOSE      POCT Blood Glucose.: 135 mg/dL (07 Mar 2025 21:40)  POCT Blood Glucose.: 108 mg/dL (07 Mar 2025 16:48)  POCT Blood Glucose.: 170 mg/dL (07 Mar 2025 12:23)    A1C with Estimated Average Glucose Result: 6.6 % (02-28-25 @ 07:39)      Physical Exam:  General: NAD; A&Ox3  Neuro: pupils equal and reactive, speech clear, no overt sensory or motor deficts  Cardiac: S1/S2, RRR, no murmur, no rubs  Lungs: unlabored respirations, CTA b/l, no wheeze, no rales, no crackles  Abdomen: Soft/NT/ND; positive bowel sounds x 4  Sternum: Intact, no click, incision healing well with no drainage  Incisions: Incisions clean/dry/intact  Extremities: No edema b/l lower extremities; good capillary refill; no cyanosis; palpable 1+ pedal pulses b/l    Central Venous Catheter: Yes: critical illness, intravenous access  Day #  Guerra Catheter: Yes: critical illness; monitor strict i/o's                    Day #  EPICARDIAL WIRES:  YES                                                                         Day #  BOWEL MOVEMENT:  [] YES [] NO, If No, Timing since last BM Day #  CHEST TUBE(MS/Left/Right):  [] YES [] NO, If yes -  AIR LEAKS:  YES/NO        LABS:                        6.8[LL]  9.43  )-----------( 153      ( 08 Mar 2025 01:32 )             20.0[L]                        7.5[L]  10.21 )-----------( 145      ( 07 Mar 2025 02:15 )             22.0[L]    03-08    137  |  101  |  17  ----------------------------<  111[H]  4.0   |  25  |  0.6[L]  03-07    138  |  103  |  17  ----------------------------<  132[H]  4.0   |  25  |  0.7    Ca    7.8[L]      08 Mar 2025 01:32  Mg     2.1     03-08    TPro  5.3[L] [6.0 - 8.0]  /  Alb  3.6 [3.5 - 5.2]  /  TBili  0.9 [0.2 - 1.2]  /  DBili  x   /  AST  25 [0 - 41]  /  ALT  21 [0 - 41]  /  AlkPhos  63 [30 - 115]  03-08    PT/INR - ( 07 Mar 2025 02:15 )   PT: ;   INR: 1.68 ratio         PTT - ( 07 Mar 2025 02:15 )  PTT:35.4 sec          RADIOLOGY & ADDITIONAL TESTS:  CXR:   EKG:    Allergies    No Known Allergies    Intolerances      MEDICATIONS  (STANDING):  ascorbic acid 500 milliGRAM(s) Oral daily  aspirin enteric coated 81 milliGRAM(s) Oral daily  atorvastatin 80 milliGRAM(s) Oral at bedtime  bisacodyl Suppository 10 milliGRAM(s) Rectal once  chlorhexidine 2% Cloths 1 Application(s) Topical daily  dextrose 5%. 1000 milliLiter(s) (50 mL/Hr) IV Continuous <Continuous>  dextrose 5%. 1000 milliLiter(s) (100 mL/Hr) IV Continuous <Continuous>  dextrose 50% Injectable 50 milliLiter(s) IV Push every 15 minutes  dextrose 50% Injectable 25 milliLiter(s) IV Push every 15 minutes  folic acid 1 milliGRAM(s) Oral daily  furosemide   Injectable 20 milliGRAM(s) IV Push daily  gabapentin 300 milliGRAM(s) Oral three times a day  glucagon  Injectable 1 milliGRAM(s) IntraMuscular once  heparin   Injectable 5000 Unit(s) SubCutaneous every 8 hours  insulin lispro (ADMELOG) corrective regimen sliding scale   SubCutaneous three times a day before meals  insulin lispro (ADMELOG) corrective regimen sliding scale   SubCutaneous at bedtime  iron sucrose IVPB 200 milliGRAM(s) IV Intermittent every 24 hours  melatonin 5 milliGRAM(s) Oral at bedtime  metoprolol tartrate 25 milliGRAM(s) Oral two times a day  pantoprazole    Tablet 40 milliGRAM(s) Oral before breakfast  polyethylene glycol 3350 17 Gram(s) Oral daily  senna 2 Tablet(s) Oral at bedtime  sodium chloride 0.9%. 1000 milliLiter(s) (20 mL/Hr) IV Continuous <Continuous>    MEDICATIONS  (PRN):  dextrose Oral Gel 15 Gram(s) Oral once PRN Blood Glucose LESS THAN 70 milliGRAM(s)/deciliter  melatonin 5 milliGRAM(s) Oral at bedtime PRN Insomnia  oxyCODONE    IR 5 milliGRAM(s) Oral every 4 hours PRN Moderate Pain (4 - 6)  oxyCODONE    IR 10 milliGRAM(s) Oral every 4 hours PRN Severe Pain (7 - 10)    Home Medications:  aspirin 81 mg oral delayed release capsule: orally once a day (05 Mar 2025 06:53)  atorvastatin 80 mg oral tablet: 1 tab(s) orally once a day (05 Mar 2025 06:53)  icosapent 1 g oral capsule: 2 cap(s) orally once a day (05 Mar 2025 06:53)  losartan 25 mg oral tablet: 1 tab(s) orally once a day (05 Mar 2025 06:53)  metFORMIN 500 mg oral tablet: 1 tab(s) orally 2 times a day (05 Mar 2025 06:53)  metoprolol succinate 25 mg oral tablet, extended release: 1 tab(s) orally once a day (05 Mar 2025 06:53)  Ozempic 2 mg/1.5 mL (1 mg dose) subcutaneous solution: subcutaneous once a week (05 Mar 2025 06:53)      Pharmacologic DVT Prophylaxis [X] YES, [] NO: Contraindication:  Heparin 5000 units q8h  SCD's: YES b/l    GI Prophylaxis: Protonix    Post-Op Beta-Blockers: [X] Yes, [] No: contraindication:  Post-Op Aspirin:  [X] Yes, [] No: contraindication:  Post-Op Statin:  [X] Yes, [] No: contraindication:    Ambulation/Activity Status: Ambulate with assistance    Assessment/Plan:  54y Male POD#3 s/p CABGx4 with L radial  - Case and plan discussed with CTU Intensivist and CT Surgeon - Dr. Babcock  - Continue CTU supportive care and ongoing plan of care as per continuing CTU rounds.   - Continue DVT/GI prophylaxis  - Incentive Spirometry 10 times an hour  - Continue to advance physical activity as tolerated and continue PT/OT as directed  1. CAD s/p CABG: Continue ASA, statin, BB. Continue diuresis with Lasix 20mg qd  2. HTN: Continue metoprolol 25mg bid. Add cardizem 30mg q8h for radial artery spasm ppx  3. Post-op A. Fib ppx: monitor electrolytes, leave wires.   4. COPD/Hypoxia/Atelectasis on CXR: supplemental o2 prn, encourage incentive spirometry, start Ipratropium  5. DM/Glucose Control (A1c 6.6): insulin sliding scale  6. Anemia 2/2 acute blood loss, pt asx: monitor H/H, active T&S, continue folic acid and IV iron  7. D/c guerra and TOV today. D/c mediastinal and L pleural chest tube today. F/u XR and start plavix after CTs removed.    Social Service Disposition:  pt to assess   OPERATIVE PROCEDURE(s): CABG x4 with L radial             POD #  3   SURGEON(s): YARED Babcock      SUBJECTIVE ASSESSMENT:54yMale patient seen and examined at bedside. C/o incisional pain.    Vital Signs Last 24 Hrs  T(F): 98.6 (08 Mar 2025 00:00), Max: 98.9 (07 Mar 2025 12:00)  HR: 93 (08 Mar 2025 09:00) (89 - 107)  BP: 114/70 (07 Mar 2025 11:00) (114/70 - 114/70)  BP(mean): 87 (07 Mar 2025 11:00) (87 - 87)  ABP: 104/54 (08 Mar 2025 09:00) (101/55 - 155/69)  ABP(mean): 70 (08 Mar 2025 09:00)  RR: 17 (08 Mar 2025 09:00) (11 - 36)  SpO2: 100% (08 Mar 2025 09:00) (97% - 100%)      I&O's Detail    07 Mar 2025 07:01  -  08 Mar 2025 07:00  --------------------------------------------------------  IN:    Insulin: 24 mL    Oral Fluid: 120 mL    sodium chloride 0.9%: 160 mL  Total IN: 304 mL    OUT:    Chest Tube (mL): 200 mL    Chest Tube (mL): 102 mL    Indwelling Catheter - Urethral (mL): 1525 mL  Total OUT: 1827 mL        Net: I&O's Detail    06 Mar 2025 07:01  -  07 Mar 2025 07:00  --------------------------------------------------------  Total NET: 175.6 mL      07 Mar 2025 07:01  -  08 Mar 2025 07:00  --------------------------------------------------------  Total NET: -1523 mL        CAPILLARY BLOOD GLUCOSE      POCT Blood Glucose.: 135 mg/dL (07 Mar 2025 21:40)  POCT Blood Glucose.: 108 mg/dL (07 Mar 2025 16:48)  POCT Blood Glucose.: 170 mg/dL (07 Mar 2025 12:23)    A1C with Estimated Average Glucose Result: 6.6 % (02-28-25 @ 07:39)      Physical Exam:  General: NAD; A&Ox3  Neuro: pupils equal and reactive, speech clear, no overt sensory or motor deficts  Cardiac: S1/S2, RRR, no murmur, no rubs  Lungs: unlabored respirations, CTA b/l, no wheeze, no rales, no crackles  Abdomen: Soft/NT/ND; positive bowel sounds x 4  Sternum: Intact, no click, incision healing well with no drainage  Incisions: Incisions clean/dry/intact  Extremities: No edema b/l lower extremities; good capillary refill; no cyanosis; palpable 1+ pedal pulses b/l    Central Venous Catheter: Yes: critical illness, intravenous access  Day # 3  Guerra Catheter: Yes: critical illness; monitor strict i/o's                    Day #3  EPICARDIAL WIRES:  YES                                                                         Day #3  BOWEL MOVEMENT:  [] YES [X] NO, If No, Timing since last BM Day #3  CHEST TUBE(MS/Left/Right):  [X] YES [] NO, If yes -  AIR LEAKS:  YES/NO        LABS:                        6.8[LL]  9.43  )-----------( 153      ( 08 Mar 2025 01:32 )             20.0[L]                        7.5[L]  10.21 )-----------( 145      ( 07 Mar 2025 02:15 )             22.0[L]    03-08    137  |  101  |  17  ----------------------------<  111[H]  4.0   |  25  |  0.6[L]  03-07    138  |  103  |  17  ----------------------------<  132[H]  4.0   |  25  |  0.7    Ca    7.8[L]      08 Mar 2025 01:32  Mg     2.1     03-08    TPro  5.3[L] [6.0 - 8.0]  /  Alb  3.6 [3.5 - 5.2]  /  TBili  0.9 [0.2 - 1.2]  /  DBili  x   /  AST  25 [0 - 41]  /  ALT  21 [0 - 41]  /  AlkPhos  63 [30 - 115]  03-08    PT/INR - ( 07 Mar 2025 02:15 )   PT: ;   INR: 1.68 ratio         PTT - ( 07 Mar 2025 02:15 )  PTT:35.4 sec          RADIOLOGY & ADDITIONAL TESTS:  CXR:   < from: Xray Chest 1 View- PORTABLE-Routine (03.08.25 @ 07:08) >  INTERPRETATION:  Clinical History / Reason for exam: Postoperative x-ray.    Comparison : Chest radiograph March 7, 2025.    Technique/Positioning: Single frontal chest x-ray obtained.    Findings:    Support devices: Unchanged right IJ catheter, mediastinal drains, left   chest tube.    Cardiac/mediastinum/hilum: Unchanged.    Lung parenchyma/Pleura: Unchanged left pleuraleffusion/opacity. No   pneumothorax.    Skeleton/soft tissues: Unchanged.    Impression:    Unchanged left pleural effusion/opacity. No pneumothorax.      EKG:  < from: 12 Lead ECG (03.07.25 @ 07:37) >  Ventricular Rate 87 BPM    Atrial Rate 87 BPM    P-R Interval 156 ms    QRS Duration 64 ms    Q-T Interval 364 ms    QTC Calculation(Bazett) 438 ms    P Axis 33 degrees    R Axis -13 degrees    T Axis 9 degrees    Diagnosis Line Normal sinus rhythm  Inferior infarct , age undetermined  Cannot rule out Anterior infarct , age undetermined  Abnormal ECG          Allergies    No Known Allergies    Intolerances      MEDICATIONS  (STANDING):  ascorbic acid 500 milliGRAM(s) Oral daily  aspirin enteric coated 81 milliGRAM(s) Oral daily  atorvastatin 80 milliGRAM(s) Oral at bedtime  bisacodyl Suppository 10 milliGRAM(s) Rectal once  chlorhexidine 2% Cloths 1 Application(s) Topical daily  dextrose 5%. 1000 milliLiter(s) (50 mL/Hr) IV Continuous <Continuous>  dextrose 5%. 1000 milliLiter(s) (100 mL/Hr) IV Continuous <Continuous>  dextrose 50% Injectable 50 milliLiter(s) IV Push every 15 minutes  dextrose 50% Injectable 25 milliLiter(s) IV Push every 15 minutes  folic acid 1 milliGRAM(s) Oral daily  furosemide   Injectable 20 milliGRAM(s) IV Push daily  gabapentin 300 milliGRAM(s) Oral three times a day  glucagon  Injectable 1 milliGRAM(s) IntraMuscular once  heparin   Injectable 5000 Unit(s) SubCutaneous every 8 hours  insulin lispro (ADMELOG) corrective regimen sliding scale   SubCutaneous three times a day before meals  insulin lispro (ADMELOG) corrective regimen sliding scale   SubCutaneous at bedtime  iron sucrose IVPB 200 milliGRAM(s) IV Intermittent every 24 hours  melatonin 5 milliGRAM(s) Oral at bedtime  metoprolol tartrate 25 milliGRAM(s) Oral two times a day  pantoprazole    Tablet 40 milliGRAM(s) Oral before breakfast  polyethylene glycol 3350 17 Gram(s) Oral daily  senna 2 Tablet(s) Oral at bedtime  sodium chloride 0.9%. 1000 milliLiter(s) (20 mL/Hr) IV Continuous <Continuous>    MEDICATIONS  (PRN):  dextrose Oral Gel 15 Gram(s) Oral once PRN Blood Glucose LESS THAN 70 milliGRAM(s)/deciliter  melatonin 5 milliGRAM(s) Oral at bedtime PRN Insomnia  oxyCODONE    IR 5 milliGRAM(s) Oral every 4 hours PRN Moderate Pain (4 - 6)  oxyCODONE    IR 10 milliGRAM(s) Oral every 4 hours PRN Severe Pain (7 - 10)    Home Medications:  aspirin 81 mg oral delayed release capsule: orally once a day (05 Mar 2025 06:53)  atorvastatin 80 mg oral tablet: 1 tab(s) orally once a day (05 Mar 2025 06:53)  icosapent 1 g oral capsule: 2 cap(s) orally once a day (05 Mar 2025 06:53)  losartan 25 mg oral tablet: 1 tab(s) orally once a day (05 Mar 2025 06:53)  metFORMIN 500 mg oral tablet: 1 tab(s) orally 2 times a day (05 Mar 2025 06:53)  metoprolol succinate 25 mg oral tablet, extended release: 1 tab(s) orally once a day (05 Mar 2025 06:53)  Ozempic 2 mg/1.5 mL (1 mg dose) subcutaneous solution: subcutaneous once a week (05 Mar 2025 06:53)      Pharmacologic DVT Prophylaxis [X] YES, [] NO: Contraindication:  Heparin 5000 units q8h  SCD's: YES b/l    GI Prophylaxis: Protonix    Post-Op Beta-Blockers: [X] Yes, [] No: contraindication:  Post-Op Aspirin:  [X] Yes, [] No: contraindication:  Post-Op Statin:  [X] Yes, [] No: contraindication:    Ambulation/Activity Status: Ambulate with assistance    Assessment/Plan:  54y Male POD#3 s/p CABGx4 with L radial  - Case and plan discussed with CTU Intensivist and CT Surgeon - Dr. Babcock  - Continue CTU supportive care and ongoing plan of care as per continuing CTU rounds.   - Continue DVT/GI prophylaxis  - Incentive Spirometry 10 times an hour  - Continue to advance physical activity as tolerated and continue PT/OT as directed  1. CAD s/p CABG: Continue ASA, statin, BB. Continue diuresis with Lasix 20mg qd.   2. HTN: Continue metoprolol 25mg bid. Add cardizem 30mg q8h for radial artery spasm ppx  3. Post-op A. Fib ppx: monitor electrolytes, leave wires.   4. COPD/Hypoxia/Atelectasis on CXR: supplemental o2 prn, encourage incentive spirometry, start Ipratropium  5. DM/Glucose Control (A1c 6.6): insulin sliding scale  6. Anemia 2/2 acute blood loss, pt asx: monitor H/H, active T&S, continue folic acid and IV iron  - D/c guerra, D/c mediastinal and L pleural chest tube today.     Social Service Disposition: TBD

## 2025-03-09 LAB
ALBUMIN SERPL ELPH-MCNC: 3.3 G/DL — LOW (ref 3.5–5.2)
ALP SERPL-CCNC: 96 U/L — SIGNIFICANT CHANGE UP (ref 30–115)
ALT FLD-CCNC: 18 U/L — SIGNIFICANT CHANGE UP (ref 0–41)
ANION GAP SERPL CALC-SCNC: 11 MMOL/L — SIGNIFICANT CHANGE UP (ref 7–14)
APPEARANCE UR: CLEAR — SIGNIFICANT CHANGE UP
AST SERPL-CCNC: 18 U/L — SIGNIFICANT CHANGE UP (ref 0–41)
BASOPHILS # BLD AUTO: 0.01 K/UL — SIGNIFICANT CHANGE UP (ref 0–0.2)
BASOPHILS NFR BLD AUTO: 0.1 % — SIGNIFICANT CHANGE UP (ref 0–1)
BILIRUB SERPL-MCNC: 0.8 MG/DL — SIGNIFICANT CHANGE UP (ref 0.2–1.2)
BILIRUB UR-MCNC: NEGATIVE — SIGNIFICANT CHANGE UP
BUN SERPL-MCNC: 15 MG/DL — SIGNIFICANT CHANGE UP (ref 10–20)
CALCIUM SERPL-MCNC: 7.8 MG/DL — LOW (ref 8.4–10.5)
CHLORIDE SERPL-SCNC: 98 MMOL/L — SIGNIFICANT CHANGE UP (ref 98–110)
CO2 SERPL-SCNC: 27 MMOL/L — SIGNIFICANT CHANGE UP (ref 17–32)
COLOR SPEC: YELLOW — SIGNIFICANT CHANGE UP
CREAT SERPL-MCNC: 0.6 MG/DL — LOW (ref 0.7–1.5)
DIFF PNL FLD: NEGATIVE — SIGNIFICANT CHANGE UP
EGFR: 115 ML/MIN/1.73M2 — SIGNIFICANT CHANGE UP
EGFR: 115 ML/MIN/1.73M2 — SIGNIFICANT CHANGE UP
EOSINOPHIL # BLD AUTO: 0.26 K/UL — SIGNIFICANT CHANGE UP (ref 0–0.7)
EOSINOPHIL NFR BLD AUTO: 3.1 % — SIGNIFICANT CHANGE UP (ref 0–8)
GLUCOSE BLDC GLUCOMTR-MCNC: 152 MG/DL — HIGH (ref 70–99)
GLUCOSE BLDC GLUCOMTR-MCNC: 162 MG/DL — HIGH (ref 70–99)
GLUCOSE BLDC GLUCOMTR-MCNC: 184 MG/DL — HIGH (ref 70–99)
GLUCOSE BLDC GLUCOMTR-MCNC: 187 MG/DL — HIGH (ref 70–99)
GLUCOSE BLDC GLUCOMTR-MCNC: 27 MG/DL — CRITICAL LOW (ref 70–99)
GLUCOSE SERPL-MCNC: 113 MG/DL — HIGH (ref 70–99)
GLUCOSE UR QL: NEGATIVE MG/DL — SIGNIFICANT CHANGE UP
HCT VFR BLD CALC: 20.7 % — LOW (ref 42–52)
HGB BLD-MCNC: 6.9 G/DL — CRITICAL LOW (ref 14–18)
IMM GRANULOCYTES NFR BLD AUTO: 0.6 % — HIGH (ref 0.1–0.3)
KETONES UR-MCNC: NEGATIVE MG/DL — SIGNIFICANT CHANGE UP
LEUKOCYTE ESTERASE UR-ACNC: NEGATIVE — SIGNIFICANT CHANGE UP
LYMPHOCYTES # BLD AUTO: 1.48 K/UL — SIGNIFICANT CHANGE UP (ref 1.2–3.4)
LYMPHOCYTES # BLD AUTO: 17.6 % — LOW (ref 20.5–51.1)
MAGNESIUM SERPL-MCNC: 1.9 MG/DL — SIGNIFICANT CHANGE UP (ref 1.8–2.4)
MCHC RBC-ENTMCNC: 25.8 PG — LOW (ref 27–31)
MCHC RBC-ENTMCNC: 33.3 G/DL — SIGNIFICANT CHANGE UP (ref 32–37)
MCV RBC AUTO: 77.5 FL — LOW (ref 80–94)
MONOCYTES # BLD AUTO: 0.7 K/UL — HIGH (ref 0.1–0.6)
MONOCYTES NFR BLD AUTO: 8.3 % — SIGNIFICANT CHANGE UP (ref 1.7–9.3)
NEUTROPHILS # BLD AUTO: 5.9 K/UL — SIGNIFICANT CHANGE UP (ref 1.4–6.5)
NEUTROPHILS NFR BLD AUTO: 70.3 % — SIGNIFICANT CHANGE UP (ref 42.2–75.2)
NITRITE UR-MCNC: NEGATIVE — SIGNIFICANT CHANGE UP
NRBC BLD AUTO-RTO: 0 /100 WBCS — SIGNIFICANT CHANGE UP (ref 0–0)
PH UR: 6.5 — SIGNIFICANT CHANGE UP (ref 5–8)
PLATELET # BLD AUTO: 203 K/UL — SIGNIFICANT CHANGE UP (ref 130–400)
PMV BLD: 9.3 FL — SIGNIFICANT CHANGE UP (ref 7.4–10.4)
POTASSIUM SERPL-MCNC: 3.9 MMOL/L — SIGNIFICANT CHANGE UP (ref 3.5–5)
POTASSIUM SERPL-SCNC: 3.9 MMOL/L — SIGNIFICANT CHANGE UP (ref 3.5–5)
PROT SERPL-MCNC: 5.6 G/DL — LOW (ref 6–8)
PROT UR-MCNC: NEGATIVE MG/DL — SIGNIFICANT CHANGE UP
RBC # BLD: 2.67 M/UL — LOW (ref 4.7–6.1)
RBC # FLD: 15.3 % — HIGH (ref 11.5–14.5)
SODIUM SERPL-SCNC: 136 MMOL/L — SIGNIFICANT CHANGE UP (ref 135–146)
SP GR SPEC: 1 — SIGNIFICANT CHANGE UP (ref 1–1.03)
UROBILINOGEN FLD QL: 1 MG/DL — SIGNIFICANT CHANGE UP (ref 0.2–1)
WBC # BLD: 8.4 K/UL — SIGNIFICANT CHANGE UP (ref 4.8–10.8)
WBC # FLD AUTO: 8.4 K/UL — SIGNIFICANT CHANGE UP (ref 4.8–10.8)

## 2025-03-09 PROCEDURE — 93010 ELECTROCARDIOGRAM REPORT: CPT

## 2025-03-09 PROCEDURE — 99291 CRITICAL CARE FIRST HOUR: CPT | Mod: 25

## 2025-03-09 PROCEDURE — 32555 ASPIRATE PLEURA W/ IMAGING: CPT | Mod: LT

## 2025-03-09 PROCEDURE — 71045 X-RAY EXAM CHEST 1 VIEW: CPT | Mod: 26

## 2025-03-09 RX ORDER — FUROSEMIDE 10 MG/ML
20 INJECTION INTRAMUSCULAR; INTRAVENOUS EVERY 12 HOURS
Refills: 0 | Status: DISCONTINUED | OUTPATIENT
Start: 2025-03-09 | End: 2025-03-12

## 2025-03-09 RX ORDER — VANCOMYCIN HCL IN 5 % DEXTROSE 1.5G/250ML
1250 PLASTIC BAG, INJECTION (ML) INTRAVENOUS ONCE
Refills: 0 | Status: COMPLETED | OUTPATIENT
Start: 2025-03-09 | End: 2025-03-09

## 2025-03-09 RX ORDER — CEFEPIME 2 G/20ML
1000 INJECTION, POWDER, FOR SOLUTION INTRAVENOUS EVERY 12 HOURS
Refills: 0 | Status: DISCONTINUED | OUTPATIENT
Start: 2025-03-10 | End: 2025-03-14

## 2025-03-09 RX ORDER — CEFEPIME 2 G/20ML
INJECTION, POWDER, FOR SOLUTION INTRAVENOUS
Refills: 0 | Status: DISCONTINUED | OUTPATIENT
Start: 2025-03-09 | End: 2025-03-14

## 2025-03-09 RX ORDER — IBUPROFEN 200 MG
400 TABLET ORAL EVERY 6 HOURS
Refills: 0 | Status: COMPLETED | OUTPATIENT
Start: 2025-03-09 | End: 2025-03-13

## 2025-03-09 RX ORDER — ONDANSETRON HCL/PF 4 MG/2 ML
4 VIAL (ML) INJECTION EVERY 4 HOURS
Refills: 0 | Status: DISCONTINUED | OUTPATIENT
Start: 2025-03-09 | End: 2025-03-14

## 2025-03-09 RX ORDER — CEFEPIME 2 G/20ML
1000 INJECTION, POWDER, FOR SOLUTION INTRAVENOUS ONCE
Refills: 0 | Status: COMPLETED | OUTPATIENT
Start: 2025-03-09 | End: 2025-03-09

## 2025-03-09 RX ADMIN — Medication 500 MICROGRAM(S): at 03:33

## 2025-03-09 RX ADMIN — HEPARIN SODIUM 5000 UNIT(S): 1000 INJECTION INTRAVENOUS; SUBCUTANEOUS at 13:41

## 2025-03-09 RX ADMIN — METOPROLOL SUCCINATE 25 MILLIGRAM(S): 50 TABLET, EXTENDED RELEASE ORAL at 13:40

## 2025-03-09 RX ADMIN — Medication 4 MILLIGRAM(S): at 21:44

## 2025-03-09 RX ADMIN — Medication 500 MILLIGRAM(S): at 12:15

## 2025-03-09 RX ADMIN — TAMSULOSIN HYDROCHLORIDE 0.4 MILLIGRAM(S): 0.4 CAPSULE ORAL at 21:25

## 2025-03-09 RX ADMIN — METOPROLOL SUCCINATE 25 MILLIGRAM(S): 50 TABLET, EXTENDED RELEASE ORAL at 06:58

## 2025-03-09 RX ADMIN — GABAPENTIN 300 MILLIGRAM(S): 400 CAPSULE ORAL at 21:26

## 2025-03-09 RX ADMIN — GABAPENTIN 300 MILLIGRAM(S): 400 CAPSULE ORAL at 06:58

## 2025-03-09 RX ADMIN — INSULIN LISPRO 1: 100 INJECTION, SOLUTION INTRAVENOUS; SUBCUTANEOUS at 07:55

## 2025-03-09 RX ADMIN — Medication 40 MILLIEQUIVALENT(S): at 12:15

## 2025-03-09 RX ADMIN — INSULIN LISPRO 1: 100 INJECTION, SOLUTION INTRAVENOUS; SUBCUTANEOUS at 17:04

## 2025-03-09 RX ADMIN — FUROSEMIDE 20 MILLIGRAM(S): 10 INJECTION INTRAMUSCULAR; INTRAVENOUS at 06:58

## 2025-03-09 RX ADMIN — DILTIAZEM HYDROCHLORIDE 30 MILLIGRAM(S): 240 TABLET, EXTENDED RELEASE ORAL at 06:58

## 2025-03-09 RX ADMIN — DILTIAZEM HYDROCHLORIDE 30 MILLIGRAM(S): 240 TABLET, EXTENDED RELEASE ORAL at 21:26

## 2025-03-09 RX ADMIN — METOPROLOL SUCCINATE 25 MILLIGRAM(S): 50 TABLET, EXTENDED RELEASE ORAL at 21:26

## 2025-03-09 RX ADMIN — ATORVASTATIN CALCIUM 80 MILLIGRAM(S): 80 TABLET, FILM COATED ORAL at 21:26

## 2025-03-09 RX ADMIN — DILTIAZEM HYDROCHLORIDE 30 MILLIGRAM(S): 240 TABLET, EXTENDED RELEASE ORAL at 13:40

## 2025-03-09 RX ADMIN — FOLIC ACID 1 MILLIGRAM(S): 1 TABLET ORAL at 12:15

## 2025-03-09 RX ADMIN — INSULIN LISPRO 1: 100 INJECTION, SOLUTION INTRAVENOUS; SUBCUTANEOUS at 11:46

## 2025-03-09 RX ADMIN — HEPARIN SODIUM 5000 UNIT(S): 1000 INJECTION INTRAVENOUS; SUBCUTANEOUS at 06:58

## 2025-03-09 RX ADMIN — Medication 81 MILLIGRAM(S): at 12:14

## 2025-03-09 RX ADMIN — FUROSEMIDE 20 MILLIGRAM(S): 10 INJECTION INTRAMUSCULAR; INTRAVENOUS at 17:05

## 2025-03-09 RX ADMIN — IRON SUCROSE 100 MILLIGRAM(S): 20 INJECTION, SOLUTION INTRAVENOUS at 12:45

## 2025-03-09 RX ADMIN — CEFEPIME 100 MILLIGRAM(S): 2 INJECTION, POWDER, FOR SOLUTION INTRAVENOUS at 18:09

## 2025-03-09 RX ADMIN — Medication 400 MILLIGRAM(S): at 18:09

## 2025-03-09 RX ADMIN — Medication 500 MICROGRAM(S): at 16:27

## 2025-03-09 RX ADMIN — Medication 2 TABLET(S): at 21:26

## 2025-03-09 RX ADMIN — Medication 500 MICROGRAM(S): at 19:24

## 2025-03-09 RX ADMIN — Medication 40 MILLIGRAM(S): at 06:58

## 2025-03-09 RX ADMIN — FUROSEMIDE 20 MILLIGRAM(S): 10 INJECTION INTRAMUSCULAR; INTRAVENOUS at 12:45

## 2025-03-09 RX ADMIN — Medication 500 MICROGRAM(S): at 08:07

## 2025-03-09 RX ADMIN — Medication 100 MILLIEQUIVALENT(S): at 07:55

## 2025-03-09 RX ADMIN — Medication 5 MILLIGRAM(S): at 21:26

## 2025-03-09 RX ADMIN — Medication 166.67 MILLIGRAM(S): at 18:09

## 2025-03-09 RX ADMIN — Medication 1 APPLICATION(S): at 06:59

## 2025-03-09 RX ADMIN — HEPARIN SODIUM 5000 UNIT(S): 1000 INJECTION INTRAVENOUS; SUBCUTANEOUS at 21:26

## 2025-03-09 RX ADMIN — GABAPENTIN 300 MILLIGRAM(S): 400 CAPSULE ORAL at 13:40

## 2025-03-09 NOTE — PROGRESS NOTE ADULT - SUBJECTIVE AND OBJECTIVE BOX
OPERATIVE PROCEDURE(s):                POD #                       54yMale  SURGEON(s): YARED Babcock  SUBJECTIVE ASSESSMENT:   Vital Signs Last 24 Hrs  T(F): 98.6 (09 Mar 2025 06:00), Max: 98.6 (09 Mar 2025 00:00)  HR: 94 (09 Mar 2025 09:00) (88 - 112)  BP: 113/68 (09 Mar 2025 09:00) (107/62 - 149/76)  BP(mean): 86 (09 Mar 2025 09:00) (78 - 103)  ABP: --  ABP(mean): --  RR: 38 (09 Mar 2025 09:00) (10 - 38)  SpO2: 94% (09 Mar 2025 09:00) (92% - 100%)  CVP(mm Hg): --  CVP(cm H2O): --  CO: --  CI: --  PA: --  SVR: --    I&O's Detail    08 Mar 2025 06:01  -  09 Mar 2025 07:00  --------------------------------------------------------  IN:    IV PiggyBack: 100 mL    Oral Fluid: 900 mL  Total IN: 1000 mL    OUT:    Chest Tube (mL): 0 mL    Chest Tube (mL): 20 mL    Indwelling Catheter - Urethral (mL): 410 mL    Voided (mL): 1650 mL  Total OUT: 2080 mL        Net:   I&O's Detail    07 Mar 2025 07:01  -  08 Mar 2025 07:00  --------------------------------------------------------  Total NET: -1523 mL      08 Mar 2025 06:01  -  09 Mar 2025 07:00  --------------------------------------------------------  Total NET: -1080 mL        CAPILLARY BLOOD GLUCOSE      POCT Blood Glucose.: 152 mg/dL (09 Mar 2025 07:23)  POCT Blood Glucose.: 142 mg/dL (08 Mar 2025 22:16)  POCT Blood Glucose.: 146 mg/dL (08 Mar 2025 16:29)  POCT Blood Glucose.: 159 mg/dL (08 Mar 2025 12:50)    Physical Exam:  General: NAD; A&Ox3/Patient is intubated and sedated  Cardiac: S1/S2, RRR, no murmur, no rubs  Lungs: unlabored respirations, CTA b/l, no wheeze, no rales, no crackles  Abdomen: Soft/NT/ND; positive bowel sounds x 4  Sternum: Intact, no click, incision healing well with no drainage  Incisions: Incisions clean/dry/intact  Extremities: No edema b/l lower extremities; good capillary refill; no cyanosis; palpable 1+ pedal pulses b/l    Central Venous Catheter: Yes[]  No[] , If Yes indication:           Day #  Moore Catheter: Yes  [] , No  [] , If yes indication:                      Day #  NGT: Yes [] No [] ,    If Yes Placement:                                     Day #  EPICARDIAL WIRES:  [] YES [] NO                                              Day #  BOWEL MOVEMENT:  [] YES [] NO, If No, Timing since last BM:      Day #  CHEST TUBE(Left/Right):  [] YES [] NO, If yes -  AIR LEAKS:  [] YES [] NO        LABS:                        6.9[LL]  8.40  )-----------( 203      ( 09 Mar 2025 03:10 )             20.7[L]                        6.8[LL]  9.43  )-----------( 153      ( 08 Mar 2025 01:32 )             20.0[L]    03-09    136  |  98  |  15  ----------------------------<  113[H]  3.9   |  27  |  0.6[L]  03-08    132[L]  |  96[L]  |  18  ----------------------------<  146[H]  4.0   |  29  |  0.8    Ca    7.8[L]      09 Mar 2025 03:10  Mg     1.9     03-09    TPro  5.6[L] [6.0 - 8.0]  /  Alb  3.3[L] [3.5 - 5.2]  /  TBili  0.8 [0.2 - 1.2]  /  DBili  x   /  AST  18 [0 - 41]  /  ALT  18 [0 - 41]  /  AlkPhos  96 [30 - 115]  03-09      Urinalysis Basic - ( 09 Mar 2025 03:10 )    Color: x / Appearance: x / SG: x / pH: x  Gluc: 113 mg/dL / Ketone: x  / Bili: x / Urobili: x   Blood: x / Protein: x / Nitrite: x   Leuk Esterase: x / RBC: x / WBC x   Sq Epi: x / Non Sq Epi: x / Bacteria: x        RADIOLOGY & ADDITIONAL TESTS:  CXR:  EKG:  MEDICATIONS  (STANDING):  ascorbic acid 500 milliGRAM(s) Oral daily  aspirin enteric coated 81 milliGRAM(s) Oral daily  atorvastatin 80 milliGRAM(s) Oral at bedtime  bisacodyl Suppository 10 milliGRAM(s) Rectal once  chlorhexidine 2% Cloths 1 Application(s) Topical daily  dextrose 5%. 1000 milliLiter(s) (50 mL/Hr) IV Continuous <Continuous>  dextrose 5%. 1000 milliLiter(s) (100 mL/Hr) IV Continuous <Continuous>  dextrose 50% Injectable 50 milliLiter(s) IV Push every 15 minutes  dextrose 50% Injectable 25 milliLiter(s) IV Push every 15 minutes  diltiazem    Tablet 30 milliGRAM(s) Oral every 8 hours  folic acid 1 milliGRAM(s) Oral daily  furosemide   Injectable 20 milliGRAM(s) IV Push daily  gabapentin 300 milliGRAM(s) Oral three times a day  glucagon  Injectable 1 milliGRAM(s) IntraMuscular once  heparin   Injectable 5000 Unit(s) SubCutaneous every 8 hours  insulin lispro (ADMELOG) corrective regimen sliding scale   SubCutaneous three times a day before meals  insulin lispro (ADMELOG) corrective regimen sliding scale   SubCutaneous at bedtime  ipratropium    for Nebulization 500 MICROGram(s) Nebulizer every 6 hours  iron sucrose IVPB 200 milliGRAM(s) IV Intermittent every 24 hours  melatonin 5 milliGRAM(s) Oral at bedtime  metoprolol tartrate 25 milliGRAM(s) Oral every 8 hours  pantoprazole    Tablet 40 milliGRAM(s) Oral before breakfast  polyethylene glycol 3350 17 Gram(s) Oral daily  senna 2 Tablet(s) Oral at bedtime  sodium chloride 0.9%. 1000 milliLiter(s) (20 mL/Hr) IV Continuous <Continuous>  tamsulosin 0.4 milliGRAM(s) Oral at bedtime    MEDICATIONS  (PRN):  dextrose Oral Gel 15 Gram(s) Oral once PRN Blood Glucose LESS THAN 70 milliGRAM(s)/deciliter  melatonin 5 milliGRAM(s) Oral at bedtime PRN Insomnia  oxyCODONE    IR 5 milliGRAM(s) Oral every 4 hours PRN Moderate Pain (4 - 6)  oxyCODONE    IR 10 milliGRAM(s) Oral every 4 hours PRN Severe Pain (7 - 10)    HEPARIN:  [] YES [] NO  Dose: XX UNITS/HR UNITS Q8H  LOVENOX:[] YES [] NO  Dose: XX mg Q24H  COUMADIN: []  YES [] NO  Dose: XX mg  Q24H  SCD's: YES b/l  GI Prophylaxis: Protonix [], Pepcid [], None [], (Contra-indication:.....)    Post-Op Beta-Blockers: Yes [], No[], If No, then contraindication:  Post-Op Aspirin: Yes [],  No [], If No, then contraindication:  Post-Op Statin: Yes [], No[], If No, then contraindication:  Allergies    No Known Allergies    Intolerances      Ambulation/Activity Status:    Assessment/Plan:  54y Male status-post .....  - Case and plan discussed with CTU Intensivist and CT Surgeon - Dr. Babcock/Nuno/Nina  - Continue CTU supportive care    - Continue DVT/GI prophylaxis  - Incentive Spirometry 10 times an hour  - Continue to advance physical activity as tolerated and continue PT/OT as directed  1. CAD: Continue ASA, statin, BB  2. HTN:   3. A. Fib:   4. COPD/Hypoxia:   5. DM/Glucose Control:     Social Service Disposition:     OPERATIVE PROCEDURE(s): CABGx4 + left radial harvest                POD #   4                    54yMale  SURGEON(s): YARED Babcock  SUBJECTIVE ASSESSMENT: pt seen and examined. no acute complaints at this time.   Vital Signs Last 24 Hrs  T(F): 98.6 (09 Mar 2025 06:00), Max: 98.6 (09 Mar 2025 00:00)  HR: 94 (09 Mar 2025 09:00) (88 - 112)  BP: 113/68 (09 Mar 2025 09:00) (107/62 - 149/76)  BP(mean): 86 (09 Mar 2025 09:00) (78 - 103)  RR: 38 (09 Mar 2025 09:00) (10 - 38)  SpO2: 94% (09 Mar 2025 09:00) (92% - 100%)    I&O's Detail    08 Mar 2025 06:01  -  09 Mar 2025 07:00  --------------------------------------------------------  IN:    IV PiggyBack: 100 mL    Oral Fluid: 900 mL  Total IN: 1000 mL    OUT:    Chest Tube (mL): 0 mL    Chest Tube (mL): 20 mL    Indwelling Catheter - Urethral (mL): 410 mL    Voided (mL): 1650 mL  Total OUT: 2080 mL        Net:   I&O's Detail    07 Mar 2025 07:01  -  08 Mar 2025 07:00  --------------------------------------------------------  Total NET: -1523 mL      08 Mar 2025 06:01  -  09 Mar 2025 07:00  --------------------------------------------------------  Total NET: -1080 mL        CAPILLARY BLOOD GLUCOSE      POCT Blood Glucose.: 152 mg/dL (09 Mar 2025 07:23)  POCT Blood Glucose.: 142 mg/dL (08 Mar 2025 22:16)  POCT Blood Glucose.: 146 mg/dL (08 Mar 2025 16:29)  POCT Blood Glucose.: 159 mg/dL (08 Mar 2025 12:50)    Physical Exam:  General: NAD; A&Ox3  Neuro: pupils equal and reactive, speech clear, no overt sensory or motor deficts  Cardiac: S1/S2, RRR, no murmur, no rubs  Lungs: unlabored respirations, CTA b/l, no wheeze, no rales, no crackles  Abdomen: Soft/NT/ND; positive bowel sounds x 4  Sternum: Intact, no click, incision healing well with no drainage  Incisions: Incisions clean/dry/intact  Extremities: No edema b/l lower extremities; good capillary refill; no cyanosis; palpable 1+ pedal pulses b/l    Central Venous Catheter: Yes: critical illness, intravenous access  Day # 4  Moore Catheter: Yes: critical illness; monitor strict i/o's                    Day #4  EPICARDIAL WIRES:  YES                                                                         Day #4  BOWEL MOVEMENT:  [] YES [X] NO, If No, Timing since last BM Day #4        LABS:                        6.9[LL]  8.40  )-----------( 203      ( 09 Mar 2025 03:10 )             20.7[L]                        6.8[LL]  9.43  )-----------( 153      ( 08 Mar 2025 01:32 )             20.0[L]    03-09    136  |  98  |  15  ----------------------------<  113[H]  3.9   |  27  |  0.6[L]  03-08    132[L]  |  96[L]  |  18  ----------------------------<  146[H]  4.0   |  29  |  0.8    Ca    7.8[L]      09 Mar 2025 03:10  Mg     1.9     03-09    TPro  5.6[L] [6.0 - 8.0]  /  Alb  3.3[L] [3.5 - 5.2]  /  TBili  0.8 [0.2 - 1.2]  /  DBili  x   /  AST  18 [0 - 41]  /  ALT  18 [0 - 41]  /  AlkPhos  96 [30 - 115]  03-09      Urinalysis Basic - ( 09 Mar 2025 03:10 )    Color: x / Appearance: x / SG: x / pH: x  Gluc: 113 mg/dL / Ketone: x  / Bili: x / Urobili: x   Blood: x / Protein: x / Nitrite: x   Leuk Esterase: x / RBC: x / WBC x   Sq Epi: x / Non Sq Epi: x / Bacteria: x        RADIOLOGY & ADDITIONAL TESTS:  CXR: < from: Xray Chest 1 View-PORTABLE IMMEDIATE (Xray Chest 1 View-PORTABLE IMMEDIATE .) (03.08.25 @ 15:39) >    Impression:  Post removal of left chest tube.  Unchanged left pleural effusion/opacity. No pneumothorax.    < end of copied text >    EKG: < from: 12 Lead ECG (03.08.25 @ 08:37) >    Ventricular Rate 90 BPM    Atrial Rate 90 BPM    P-R Interval 148 ms    QRS Duration 98 ms    Q-T Interval 374 ms    QTC Calculation(Bazett) 457 ms    P Axis 18 degrees    R Axis -16 degrees    T Axis 4 degrees    Diagnosis Line Normal sinus rhythm  Nonspecific ST and T wave abnormality  Abnormal ECG    < end of copied text >    MEDICATIONS  (STANDING):  ascorbic acid 500 milliGRAM(s) Oral daily  aspirin enteric coated 81 milliGRAM(s) Oral daily  atorvastatin 80 milliGRAM(s) Oral at bedtime  bisacodyl Suppository 10 milliGRAM(s) Rectal once  chlorhexidine 2% Cloths 1 Application(s) Topical daily  dextrose 5%. 1000 milliLiter(s) (50 mL/Hr) IV Continuous <Continuous>  dextrose 5%. 1000 milliLiter(s) (100 mL/Hr) IV Continuous <Continuous>  dextrose 50% Injectable 50 milliLiter(s) IV Push every 15 minutes  dextrose 50% Injectable 25 milliLiter(s) IV Push every 15 minutes  diltiazem    Tablet 30 milliGRAM(s) Oral every 8 hours  folic acid 1 milliGRAM(s) Oral daily  furosemide   Injectable 20 milliGRAM(s) IV Push daily  gabapentin 300 milliGRAM(s) Oral three times a day  glucagon  Injectable 1 milliGRAM(s) IntraMuscular once  heparin   Injectable 5000 Unit(s) SubCutaneous every 8 hours  insulin lispro (ADMELOG) corrective regimen sliding scale   SubCutaneous three times a day before meals  insulin lispro (ADMELOG) corrective regimen sliding scale   SubCutaneous at bedtime  ipratropium    for Nebulization 500 MICROGram(s) Nebulizer every 6 hours  iron sucrose IVPB 200 milliGRAM(s) IV Intermittent every 24 hours  melatonin 5 milliGRAM(s) Oral at bedtime  metoprolol tartrate 25 milliGRAM(s) Oral every 8 hours  pantoprazole    Tablet 40 milliGRAM(s) Oral before breakfast  polyethylene glycol 3350 17 Gram(s) Oral daily  senna 2 Tablet(s) Oral at bedtime  sodium chloride 0.9%. 1000 milliLiter(s) (20 mL/Hr) IV Continuous <Continuous>  tamsulosin 0.4 milliGRAM(s) Oral at bedtime    MEDICATIONS  (PRN):  dextrose Oral Gel 15 Gram(s) Oral once PRN Blood Glucose LESS THAN 70 milliGRAM(s)/deciliter  melatonin 5 milliGRAM(s) Oral at bedtime PRN Insomnia  oxyCODONE    IR 5 milliGRAM(s) Oral every 4 hours PRN Moderate Pain (4 - 6)  oxyCODONE    IR 10 milliGRAM(s) Oral every 4 hours PRN Severe Pain (7 - 10)    Pharmacologic DVT Prophylaxis [X] YES, [] NO: Contraindication:  Heparin 5000 units q8h  SCD's: YES b/l    GI Prophylaxis: Protonix    Post-Op Beta-Blockers: [X] Yes, [] No: contraindication:  Post-Op Aspirin:  [X] Yes, [] No: contraindication:  Post-Op Statin:  [X] Yes, [] No: contraindication:    Allergies    No Known Allergies    Intolerances      Ambulation/Activity Status: ambulate     Assessment/Plan:  54y Male POD#4 s/p CABGx4 with L radial  - Case and plan discussed with CTU Intensivist and CT Surgeon - Dr. Babcock  - Continue CTU supportive care and ongoing plan of care as per continuing CTU rounds.   - Continue DVT/GI prophylaxis  - Incentive Spirometry 10 times an hour  - Continue to advance physical activity as tolerated and continue PT/OT as directed  1. CAD s/p CABG: Continue ASA, statin, BB. Continue diuresis with Lasix 20mg qd.   2. HTN: Continue metoprolol 25mg bid. Add cardizem 30mg q8h for radial artery spasm ppx  3. Post-op A. Fib ppx: monitor electrolytes, leave wires.   4. COPD/Hypoxia/Atelectasis on CXR: supplemental o2 prn, encourage incentive spirometry, start Ipratropium  5. DM/Glucose Control (A1c 6.6): insulin sliding scale  6. Anemia 2/2 acute blood loss, pt asx: monitor H/H, active T&S, continue folic acid and IV iron      Social Service Disposition: home

## 2025-03-09 NOTE — PROGRESS NOTE ADULT - ASSESSMENT
Assessment   s/p CABG POD 4     -L effusion and opacities will trear as possible pnumonia Cefpim and one dose vanc Cultures Vlood sent      Plan:    Neuro:  Multimodal pain management  Tylenol, Lidoderm patch, gabapentin, opiates as needed  Monitor neuro status in the post op period    CV: CAD  S/P CABG:  ASA  HTN on lopressor   Monitor perfusion, , lactate, UOP  Maintain MAP > 65  diuresis  Sinus tack on lopressor  possibly from possible pnumonia atb started  and one dose motrin     Resp:  L lung atelactasis - possible infitrate  and  effusion on POCUStoday L thora 400 ML   Supplemental oxygen to maintain sats > 92%  Continuous pulse oximetry monitoring  IS / Chest PT  Nebulizers as needed  Nebulizers    GI:  Advance diet - cardiac  Bowel Regimen - escalate as needed  PUD ppx per protocol    Renal  High risk for TERENCE post surgery  Monitor UOP, lytes, creatinine  Start lasix today - goal negative 1L   Keep K > 4, Mg > 2    Endo:  Tight glucose control per CTICU protocol  Insulin gtt as needed  Transition to Lantus / SSI and premeal insulin as needed    Heme:  Acute blood loss anemia post surgery iron and folate   DVT ppx    ID:  Perioperative prophylactic abx per protocol  Monitor fever curve and WBC count  treat emperically for Possible pnumonia cefpim and vanc one dose       Upon my evaluation, the above patient has a high probability of imminent or life threatening deterioration due to a high risk for Shock, Cardiogenic shock, arrythmia, acute blood loss, acute kidney injury and acute pulmonary insufficiency which required my direct attention, intervention, and personal management.  Total critical care time indicated in the note includes review of radiology results, ordering, interpreting and reviewing diagnostic studies / lab tests with immediate assessment and treatment, consultant collaboration on findings and treatment options, monitoring for potential decompensation, obtaining history from family, EMT, nursing home staff and/or treating physicians; directing the titration of pressors, oxygen support devices, fluid resuscitation, interpretation of cardiac output measurements, interpretation of radiological assessments, interpretation of EKG / rhythm strips, telemetry.  This time did not overlap with the management of other patients or performing separately reportable procedures.      Management of this patient was discussed with the CT Surgery/Thoracic surgery/Structural Cardiology attending and mid-level providers.    I acknowledge the use of copied documentation.  All copy forward documentation is my own and has been reviewed and revised as appropriate.  If no changes were made, it is because that information remains the same.

## 2025-03-09 NOTE — PROGRESS NOTE ADULT - SUBJECTIVE AND OBJECTIVE BOX
CTU Attending Progress Daily Note       Procedure: CABG  POD#: 4    He has history of HLD (hyperlipidemia)  Hypertriglyceridemia  DM (diabetes mellitus)  CAD (coronary artery disease)  HTN (hypertension)      HPI:  Mr. BYRON NAJERA is a 54 year M with PMhx  includes DLD, HTN, CAD. Mr. NAJERA was worked up for complaints SOB on exertion. He  had a cardiac catheterization which revealed multivessel disease. On 25, he underwent surgical myocardial revascularization.    Hospital Course:  3/5 CABG 4 LIMA + Lt Radial  OR C2, Alb1, ,      on Levo and Vaso: Albumin 500 given  post-extubation hypercapnia on BIPAP  chest tube 300ml: Hb 7.7,              1 PRBCs 1 Platelets  3/6 Stable hemodynamics, off pressors, start BB  3/7 started diuresis  3/8 Diuresis, Hb 6.8 - transfuse only if symptomatic  3/9  L thora 400 ML fever Cefpim started one dose Vanc    OBJECTIVE:  ICU Vital Signs Last 24 Hrs  T(C): 38.1 (09 Mar 2025 18:00), Max: 38.1 (09 Mar 2025 18:00)  T(F): 100.5 (09 Mar 2025 18:00), Max: 100.5 (09 Mar 2025 18:00)  HR: 107 (09 Mar 2025 20:00) (88 - 121)  BP: 141/69 (09 Mar 2025 19:00) (106/66 - 143/70)  BP(mean): 98 (09 Mar 2025 19:00) (78 - 106)  ABP: --  ABP(mean): --  RR: 23 (09 Mar 2025 20:00) (13 - 38)  SpO2: 100% (09 Mar 2025 20:00) (92% - 100%)    O2 Parameters below as of 09 Mar 2025 20:00  Patient On (Oxygen Delivery Method): nasal cannula  O2 Flow (L/min): 2        I&O's Summary    08 Mar 2025 06:01  -  09 Mar 2025 07:00  --------------------------------------------------------  IN: 1000 mL / OUT: 2080 mL / NET: -1080 mL    09 Mar 2025 07:  -  09 Mar 2025 20:52  --------------------------------------------------------  IN: 200 mL / OUT: 2900 mL / NET: -2700 mL      I&O's Detail    08 Mar 2025 06:01  -  09 Mar 2025 07:00  --------------------------------------------------------  IN:    IV PiggyBack: 100 mL    Oral Fluid: 900 mL  Total IN: 1000 mL    OUT:    Chest Tube (mL): 0 mL    Chest Tube (mL): 20 mL    Indwelling Catheter - Urethral (mL): 410 mL    Voided (mL): 1650 mL  Total OUT: 2080 mL    Total NET: -1080 mL      09 Mar 2025 07:01  -  09 Mar 2025 20:52  --------------------------------------------------------  IN:    IV PiggyBack: 200 mL  Total IN: 200 mL    OUT:    Other (mL): 400 mL    Voided (mL): 2500 mL  Total OUT: 2900 mL    Total NET: -2700 mL          CAPILLARY BLOOD GLUCOSE      POCT Blood Glucose.: 187 mg/dL (09 Mar 2025 16:49)  POCT Blood Glucose.: 162 mg/dL (09 Mar 2025 11:38)  POCT Blood Glucose.: 27 mg/dL (09 Mar 2025 11:37)  POCT Blood Glucose.: 152 mg/dL (09 Mar 2025 07:23)  POCT Blood Glucose.: 142 mg/dL (08 Mar 2025 22:16)    LABS:                          6.9    8.40  )-----------( 203      ( 09 Mar 2025 03:10 )             20.7     03-09    136  |  98  |  15  ----------------------------<  113[H]  3.9   |  27  |  0.6[L]    Ca    7.8[L]      09 Mar 2025 03:10  Mg     1.9         TPro  5.6[L]  /  Alb  3.3[L]  /  TBili  0.8  /  DBili  x   /  AST  18  /  ALT  18  /  AlkPhos  96        Urinalysis Basic - ( 09 Mar 2025 18:56 )    Color: Yellow / Appearance: Clear / S.005 / pH: x  Gluc: x / Ketone: Negative mg/dL  / Bili: Negative / Urobili: 1.0 mg/dL   Blood: x / Protein: Negative mg/dL / Nitrite: Negative   Leuk Esterase: Negative / RBC: x / WBC x   Sq Epi: x / Non Sq Epi: x / Bacteria: x        Home Medications:  aspirin 81 mg oral delayed release capsule: orally once a day (05 Mar 2025 06:53)  atorvastatin 80 mg oral tablet: 1 tab(s) orally once a day (05 Mar 2025 06:53)  icosapent 1 g oral capsule: 2 cap(s) orally once a day (05 Mar 2025 06:53)  losartan 25 mg oral tablet: 1 tab(s) orally once a day (05 Mar 2025 06:53)  metFORMIN 500 mg oral tablet: 1 tab(s) orally 2 times a day (05 Mar 2025 06:53)  metoprolol succinate 25 mg oral tablet, extended release: 1 tab(s) orally once a day (05 Mar 2025 06:53)  Ozempic 2 mg/1.5 mL (1 mg dose) subcutaneous solution: subcutaneous once a week (05 Mar 2025 06:53)    HOSPITAL MEDICATIONS:  MEDICATIONS  (STANDING):  ascorbic acid 500 milliGRAM(s) Oral daily  aspirin enteric coated 81 milliGRAM(s) Oral daily  atorvastatin 80 milliGRAM(s) Oral at bedtime  bisacodyl Suppository 10 milliGRAM(s) Rectal once  cefepime   IVPB      chlorhexidine 2% Cloths 1 Application(s) Topical daily  dextrose 5%. 1000 milliLiter(s) (50 mL/Hr) IV Continuous <Continuous>  dextrose 5%. 1000 milliLiter(s) (100 mL/Hr) IV Continuous <Continuous>  dextrose 50% Injectable 50 milliLiter(s) IV Push every 15 minutes  dextrose 50% Injectable 25 milliLiter(s) IV Push every 15 minutes  diltiazem    Tablet 30 milliGRAM(s) Oral every 8 hours  folic acid 1 milliGRAM(s) Oral daily  furosemide   Injectable 20 milliGRAM(s) IV Push every 12 hours  gabapentin 300 milliGRAM(s) Oral three times a day  glucagon  Injectable 1 milliGRAM(s) IntraMuscular once  heparin   Injectable 5000 Unit(s) SubCutaneous every 8 hours  insulin lispro (ADMELOG) corrective regimen sliding scale   SubCutaneous three times a day before meals  insulin lispro (ADMELOG) corrective regimen sliding scale   SubCutaneous at bedtime  ipratropium    for Nebulization 500 MICROGram(s) Nebulizer every 6 hours  iron sucrose IVPB 200 milliGRAM(s) IV Intermittent every 24 hours  melatonin 5 milliGRAM(s) Oral at bedtime  metoprolol tartrate 25 milliGRAM(s) Oral every 8 hours  pantoprazole    Tablet 40 milliGRAM(s) Oral before breakfast  polyethylene glycol 3350 17 Gram(s) Oral daily  potassium chloride    Tablet ER 40 milliEquivalent(s) Oral daily  senna 2 Tablet(s) Oral at bedtime  sodium chloride 0.9%. 1000 milliLiter(s) (20 mL/Hr) IV Continuous <Continuous>  tamsulosin 0.4 milliGRAM(s) Oral at bedtime    MEDICATIONS  (PRN):  dextrose Oral Gel 15 Gram(s) Oral once PRN Blood Glucose LESS THAN 70 milliGRAM(s)/deciliter  ibuprofen  Tablet. 400 milliGRAM(s) Oral every 6 hours PRN Temp greater or equal to 38C (100.4F)  melatonin 5 milliGRAM(s) Oral at bedtime PRN Insomnia  oxyCODONE    IR 5 milliGRAM(s) Oral every 4 hours PRN Moderate Pain (4 - 6)  oxyCODONE    IR 10 milliGRAM(s) Oral every 4 hours PRN Severe Pain (7 - 10)    RADIOLOGY:  Chest X-ray Reviewed    REVIEW OF SYSTEMS:  CONSTITUTIONAL: [X] all negative; [ ] weakness, [ ] fevers, [ ] chills  EYES/ENT: [X] all negative; [ ] visual changes, [ ] vertigo, [ ] throat pain   NECK: [X] all negative; [ ] pain, [ ] stiffness  RESPIRATORY: [x] all negative, [ ] cough, [ ] wheezing, [ ] hemoptysis, [ ] shortness of breath  CARDIOVASCULAR: [x] all negative; [ ] chest pain, [ ] palpitations, [ ] orthopnea  GASTROINTESTINAL: [X] all negative; [ ]abdominal pain, [ ] nausea, [ ] vomiting, [ ] hematemesis, [ ] diarrhea, [ ] constipation, [ ] melena, [ ] hematochezia.  GENITOURINARY: [X] all negative; [ ] dysuria, [ ] frequency, [ ] hematuria  NEUROLOGICAL: [X] all negative; [ ] numbness, [ ] weakness  SKIN: [X] all negative; [ ] itching, [ ] burning, [ ] rashes, [ ] lesions   All other review of systems is negative unless indicated above.  [  ] Unable to assess ROS because       PHYSICAL EXAM:          CONSTITUTIONAL: Well-developed; well-nourished; in no acute distress.   	SKIN: warm, dry  	HEAD: Normocephalic; atraumatic.  	EYES: PERRL, EOM, no conj injection, sclera clear  	ENT: No nasal discharge; airway clear.  	NECK: Supple; non tender.   	CARD: S1, S2 normal; no murmurs, gallops, or rubs. Regular rate and rhythm.  no carotid bruits  	RESP: CTA B/L; good air movement No wheezes, rales or rhonchi.  	ABD: Soft, not tender, not distended,  no rebound or guarding, bowel sounds present  	EXT: Normal ROM.  No clubbing, cyanosis or edema.   warm and well perfused.  pulses palpable  	NEURO: Alert, awake, motor 5/5 R, 5/5 L

## 2025-03-10 LAB
ALBUMIN SERPL ELPH-MCNC: 3.4 G/DL — LOW (ref 3.5–5.2)
ALP SERPL-CCNC: 108 U/L — SIGNIFICANT CHANGE UP (ref 30–115)
ALT FLD-CCNC: 18 U/L — SIGNIFICANT CHANGE UP (ref 0–41)
ANION GAP SERPL CALC-SCNC: 9 MMOL/L — SIGNIFICANT CHANGE UP (ref 7–14)
AST SERPL-CCNC: 17 U/L — SIGNIFICANT CHANGE UP (ref 0–41)
BASOPHILS # BLD AUTO: 0.01 K/UL — SIGNIFICANT CHANGE UP (ref 0–0.2)
BASOPHILS NFR BLD AUTO: 0.2 % — SIGNIFICANT CHANGE UP (ref 0–1)
BILIRUB SERPL-MCNC: 0.7 MG/DL — SIGNIFICANT CHANGE UP (ref 0.2–1.2)
BUN SERPL-MCNC: 13 MG/DL — SIGNIFICANT CHANGE UP (ref 10–20)
CALCIUM SERPL-MCNC: 8.4 MG/DL — SIGNIFICANT CHANGE UP (ref 8.4–10.5)
CHLORIDE SERPL-SCNC: 101 MMOL/L — SIGNIFICANT CHANGE UP (ref 98–110)
CO2 SERPL-SCNC: 30 MMOL/L — SIGNIFICANT CHANGE UP (ref 17–32)
CREAT SERPL-MCNC: 0.6 MG/DL — LOW (ref 0.7–1.5)
EGFR: 115 ML/MIN/1.73M2 — SIGNIFICANT CHANGE UP
EGFR: 115 ML/MIN/1.73M2 — SIGNIFICANT CHANGE UP
EOSINOPHIL # BLD AUTO: 0.32 K/UL — SIGNIFICANT CHANGE UP (ref 0–0.7)
EOSINOPHIL NFR BLD AUTO: 5.5 % — SIGNIFICANT CHANGE UP (ref 0–8)
GLUCOSE BLDC GLUCOMTR-MCNC: 141 MG/DL — HIGH (ref 70–99)
GLUCOSE BLDC GLUCOMTR-MCNC: 172 MG/DL — HIGH (ref 70–99)
GLUCOSE BLDC GLUCOMTR-MCNC: 190 MG/DL — HIGH (ref 70–99)
GLUCOSE BLDC GLUCOMTR-MCNC: 216 MG/DL — HIGH (ref 70–99)
GLUCOSE BLDC GLUCOMTR-MCNC: 255 MG/DL — HIGH (ref 70–99)
GLUCOSE SERPL-MCNC: 119 MG/DL — HIGH (ref 70–99)
HCT VFR BLD CALC: 20.5 % — LOW (ref 42–52)
HGB BLD-MCNC: 6.9 G/DL — CRITICAL LOW (ref 14–18)
IMM GRANULOCYTES NFR BLD AUTO: 1 % — HIGH (ref 0.1–0.3)
LYMPHOCYTES # BLD AUTO: 1.43 K/UL — SIGNIFICANT CHANGE UP (ref 1.2–3.4)
LYMPHOCYTES # BLD AUTO: 24.5 % — SIGNIFICANT CHANGE UP (ref 20.5–51.1)
MAGNESIUM SERPL-MCNC: 2 MG/DL — SIGNIFICANT CHANGE UP (ref 1.8–2.4)
MCHC RBC-ENTMCNC: 26.4 PG — LOW (ref 27–31)
MCHC RBC-ENTMCNC: 33.7 G/DL — SIGNIFICANT CHANGE UP (ref 32–37)
MCV RBC AUTO: 78.5 FL — LOW (ref 80–94)
MONOCYTES # BLD AUTO: 0.8 K/UL — HIGH (ref 0.1–0.6)
MONOCYTES NFR BLD AUTO: 13.7 % — HIGH (ref 1.7–9.3)
NEUTROPHILS # BLD AUTO: 3.21 K/UL — SIGNIFICANT CHANGE UP (ref 1.4–6.5)
NEUTROPHILS NFR BLD AUTO: 55.1 % — SIGNIFICANT CHANGE UP (ref 42.2–75.2)
NRBC BLD AUTO-RTO: 0 /100 WBCS — SIGNIFICANT CHANGE UP (ref 0–0)
PLATELET # BLD AUTO: 191 K/UL — SIGNIFICANT CHANGE UP (ref 130–400)
PMV BLD: 8.8 FL — SIGNIFICANT CHANGE UP (ref 7.4–10.4)
POTASSIUM SERPL-MCNC: 3.8 MMOL/L — SIGNIFICANT CHANGE UP (ref 3.5–5)
POTASSIUM SERPL-SCNC: 3.8 MMOL/L — SIGNIFICANT CHANGE UP (ref 3.5–5)
PROT SERPL-MCNC: 5.7 G/DL — LOW (ref 6–8)
RBC # BLD: 2.61 M/UL — LOW (ref 4.7–6.1)
RBC # FLD: 15.7 % — HIGH (ref 11.5–14.5)
SODIUM SERPL-SCNC: 140 MMOL/L — SIGNIFICANT CHANGE UP (ref 135–146)
T3 SERPL-MCNC: 75 NG/DL — LOW (ref 80–200)
T4 AB SER-ACNC: 5.8 UG/DL — SIGNIFICANT CHANGE UP (ref 4.6–12)
T4 FREE SERPL-MCNC: 1.1 NG/DL — SIGNIFICANT CHANGE UP (ref 0.9–1.7)
TSH SERPL-MCNC: 1.69 UIU/ML — SIGNIFICANT CHANGE UP (ref 0.27–4.2)
WBC # BLD: 5.83 K/UL — SIGNIFICANT CHANGE UP (ref 4.8–10.8)
WBC # FLD AUTO: 5.83 K/UL — SIGNIFICANT CHANGE UP (ref 4.8–10.8)

## 2025-03-10 PROCEDURE — 71045 X-RAY EXAM CHEST 1 VIEW: CPT | Mod: 26

## 2025-03-10 PROCEDURE — 93010 ELECTROCARDIOGRAM REPORT: CPT

## 2025-03-10 PROCEDURE — 71045 X-RAY EXAM CHEST 1 VIEW: CPT | Mod: 26,77

## 2025-03-10 PROCEDURE — 99291 CRITICAL CARE FIRST HOUR: CPT

## 2025-03-10 PROCEDURE — 32556 INSERT CATH PLEURA W/O IMAGE: CPT | Mod: LT

## 2025-03-10 PROCEDURE — 71250 CT THORAX DX C-: CPT | Mod: 26

## 2025-03-10 RX ORDER — ASPIRIN 325 MG
325 TABLET ORAL DAILY
Refills: 0 | Status: DISCONTINUED | OUTPATIENT
Start: 2025-03-10 | End: 2025-03-10

## 2025-03-10 RX ORDER — METOPROLOL SUCCINATE 50 MG/1
50 TABLET, EXTENDED RELEASE ORAL EVERY 8 HOURS
Refills: 0 | Status: DISCONTINUED | OUTPATIENT
Start: 2025-03-10 | End: 2025-03-14

## 2025-03-10 RX ORDER — CLOPIDOGREL BISULFATE 75 MG/1
75 TABLET, FILM COATED ORAL DAILY
Refills: 0 | Status: DISCONTINUED | OUTPATIENT
Start: 2025-03-10 | End: 2025-03-10

## 2025-03-10 RX ORDER — COLCHICINE 0.6 MG/1
0.6 TABLET, FILM COATED ORAL
Refills: 0 | Status: DISCONTINUED | OUTPATIENT
Start: 2025-03-10 | End: 2025-03-10

## 2025-03-10 RX ORDER — PREDNISONE 20 MG/1
40 TABLET ORAL ONCE
Refills: 0 | Status: DISCONTINUED | OUTPATIENT
Start: 2025-03-10 | End: 2025-03-10

## 2025-03-10 RX ORDER — DILTIAZEM HYDROCHLORIDE 240 MG/1
60 TABLET, EXTENDED RELEASE ORAL EVERY 8 HOURS
Refills: 0 | Status: DISCONTINUED | OUTPATIENT
Start: 2025-03-10 | End: 2025-03-14

## 2025-03-10 RX ORDER — ALBUMIN (HUMAN) 12.5 G/50ML
50 INJECTION, SOLUTION INTRAVENOUS EVERY 6 HOURS
Refills: 0 | Status: DISCONTINUED | OUTPATIENT
Start: 2025-03-10 | End: 2025-03-11

## 2025-03-10 RX ORDER — HYDROMORPHONE/SOD CHLOR,ISO/PF 2 MG/10 ML
2 SYRINGE (ML) INJECTION ONCE
Refills: 0 | Status: DISCONTINUED | OUTPATIENT
Start: 2025-03-10 | End: 2025-03-10

## 2025-03-10 RX ORDER — METOPROLOL SUCCINATE 50 MG/1
25 TABLET, EXTENDED RELEASE ORAL ONCE
Refills: 0 | Status: COMPLETED | OUTPATIENT
Start: 2025-03-10 | End: 2025-03-10

## 2025-03-10 RX ORDER — ASPIRIN 325 MG
81 TABLET ORAL DAILY
Refills: 0 | Status: DISCONTINUED | OUTPATIENT
Start: 2025-03-10 | End: 2025-03-14

## 2025-03-10 RX ORDER — DILTIAZEM HYDROCHLORIDE 240 MG/1
30 TABLET, EXTENDED RELEASE ORAL ONCE
Refills: 0 | Status: COMPLETED | OUTPATIENT
Start: 2025-03-10 | End: 2025-03-10

## 2025-03-10 RX ADMIN — Medication 500 MICROGRAM(S): at 08:30

## 2025-03-10 RX ADMIN — POLYETHYLENE GLYCOL 3350 17 GRAM(S): 17 POWDER, FOR SOLUTION ORAL at 12:01

## 2025-03-10 RX ADMIN — DILTIAZEM HYDROCHLORIDE 30 MILLIGRAM(S): 240 TABLET, EXTENDED RELEASE ORAL at 10:01

## 2025-03-10 RX ADMIN — FUROSEMIDE 20 MILLIGRAM(S): 10 INJECTION INTRAMUSCULAR; INTRAVENOUS at 06:01

## 2025-03-10 RX ADMIN — HEPARIN SODIUM 5000 UNIT(S): 1000 INJECTION INTRAVENOUS; SUBCUTANEOUS at 14:04

## 2025-03-10 RX ADMIN — Medication 500 MICROGRAM(S): at 14:11

## 2025-03-10 RX ADMIN — INSULIN LISPRO 1: 100 INJECTION, SOLUTION INTRAVENOUS; SUBCUTANEOUS at 07:55

## 2025-03-10 RX ADMIN — CEFEPIME 100 MILLIGRAM(S): 2 INJECTION, POWDER, FOR SOLUTION INTRAVENOUS at 06:01

## 2025-03-10 RX ADMIN — TAMSULOSIN HYDROCHLORIDE 0.4 MILLIGRAM(S): 0.4 CAPSULE ORAL at 22:52

## 2025-03-10 RX ADMIN — GABAPENTIN 300 MILLIGRAM(S): 400 CAPSULE ORAL at 06:01

## 2025-03-10 RX ADMIN — HEPARIN SODIUM 5000 UNIT(S): 1000 INJECTION INTRAVENOUS; SUBCUTANEOUS at 22:53

## 2025-03-10 RX ADMIN — Medication 81 MILLIGRAM(S): at 12:02

## 2025-03-10 RX ADMIN — GABAPENTIN 300 MILLIGRAM(S): 400 CAPSULE ORAL at 22:53

## 2025-03-10 RX ADMIN — METOPROLOL SUCCINATE 25 MILLIGRAM(S): 50 TABLET, EXTENDED RELEASE ORAL at 22:53

## 2025-03-10 RX ADMIN — INSULIN LISPRO 3: 100 INJECTION, SOLUTION INTRAVENOUS; SUBCUTANEOUS at 17:29

## 2025-03-10 RX ADMIN — CLOPIDOGREL BISULFATE 75 MILLIGRAM(S): 75 TABLET, FILM COATED ORAL at 12:02

## 2025-03-10 RX ADMIN — OXYCODONE HYDROCHLORIDE 10 MILLIGRAM(S): 30 TABLET ORAL at 23:36

## 2025-03-10 RX ADMIN — Medication 500 MICROGRAM(S): at 01:16

## 2025-03-10 RX ADMIN — Medication 40 MILLIEQUIVALENT(S): at 06:03

## 2025-03-10 RX ADMIN — Medication 500 MILLIGRAM(S): at 12:02

## 2025-03-10 RX ADMIN — ATORVASTATIN CALCIUM 80 MILLIGRAM(S): 80 TABLET, FILM COATED ORAL at 22:53

## 2025-03-10 RX ADMIN — GABAPENTIN 300 MILLIGRAM(S): 400 CAPSULE ORAL at 14:04

## 2025-03-10 RX ADMIN — ALBUMIN (HUMAN) 50 MILLILITER(S): 12.5 INJECTION, SOLUTION INTRAVENOUS at 18:28

## 2025-03-10 RX ADMIN — CEFEPIME 100 MILLIGRAM(S): 2 INJECTION, POWDER, FOR SOLUTION INTRAVENOUS at 18:28

## 2025-03-10 RX ADMIN — METOPROLOL SUCCINATE 25 MILLIGRAM(S): 50 TABLET, EXTENDED RELEASE ORAL at 06:01

## 2025-03-10 RX ADMIN — Medication 40 MILLIGRAM(S): at 06:01

## 2025-03-10 RX ADMIN — DILTIAZEM HYDROCHLORIDE 60 MILLIGRAM(S): 240 TABLET, EXTENDED RELEASE ORAL at 14:04

## 2025-03-10 RX ADMIN — Medication 1 APPLICATION(S): at 06:02

## 2025-03-10 RX ADMIN — HEPARIN SODIUM 5000 UNIT(S): 1000 INJECTION INTRAVENOUS; SUBCUTANEOUS at 06:01

## 2025-03-10 RX ADMIN — Medication 5 MILLIGRAM(S): at 22:52

## 2025-03-10 RX ADMIN — METOPROLOL SUCCINATE 25 MILLIGRAM(S): 50 TABLET, EXTENDED RELEASE ORAL at 15:27

## 2025-03-10 RX ADMIN — Medication 500 MICROGRAM(S): at 19:37

## 2025-03-10 RX ADMIN — FOLIC ACID 1 MILLIGRAM(S): 1 TABLET ORAL at 12:01

## 2025-03-10 RX ADMIN — Medication 40 MILLIEQUIVALENT(S): at 12:01

## 2025-03-10 RX ADMIN — INSULIN LISPRO 2: 100 INJECTION, SOLUTION INTRAVENOUS; SUBCUTANEOUS at 11:33

## 2025-03-10 RX ADMIN — DILTIAZEM HYDROCHLORIDE 60 MILLIGRAM(S): 240 TABLET, EXTENDED RELEASE ORAL at 22:52

## 2025-03-10 RX ADMIN — Medication 2 MILLIGRAM(S): at 16:45

## 2025-03-10 RX ADMIN — IRON SUCROSE 100 MILLIGRAM(S): 20 INJECTION, SOLUTION INTRAVENOUS at 12:01

## 2025-03-10 RX ADMIN — DILTIAZEM HYDROCHLORIDE 30 MILLIGRAM(S): 240 TABLET, EXTENDED RELEASE ORAL at 06:01

## 2025-03-10 NOTE — CHART NOTE - NSCHARTNOTEFT_GEN_A_CORE
Brief Nutrition Note    Discussed with pt and family at bedside. Pt reports having a good appetite; consumed >75% of breakfast today (>75% of scrambled eggs, 100% German toast, 100% apple juice, 100% fruit). Tolerating diet texture/consistency well. No nausea or vomiting reported. Last BM on 3/10.     Nutrition Hx: Pt reports having a good appetite prior to admit; consumed 3 meals daily. Denies taking vitamin or mineral supplements. Denies drinking protein shake supplements. NKFA; denies any restrictive cultural or Restorationism food preferences. No chewing or swallowing difficulties noted with regular textured food. #/84 KG -- checked 1 month ago. Current dosing weight is 84 KG. Weight stable within 1 month compared to current dosing weight. Pt does not meet weight criteria for malnutrition at this time.     Low risk f/u    RD to remain available: Karli Sherman [phone] or TEAMS

## 2025-03-10 NOTE — CONSULT NOTE ADULT - ASSESSMENT
ASSESSMENT:  54 year M with PMhx  includes DLD, HTN, CAD that was worked up for complaints SOB on exertion. Thoracic surgery consulted for evaluation of left pleural effusion.  Physical exam findings, imaging, and labs as documented above.     PLAN:  -care as per CTU  -s/p L pigtail to sxn, -air leak  -monitor output of pigtail  -qdaily chest xray    Above plan discussed with Attending Surgeon Dr. Isma Strange , patient, patient family, and Primary team  03-10-25 @ 17:29    CONSULT SPECTRA: [id number]

## 2025-03-10 NOTE — PROGRESS NOTE ADULT - SUBJECTIVE AND OBJECTIVE BOX
CTU Attending Progress Daily Note       Procedure: CABG  POD#: 5    He has history of HLD (hyperlipidemia)    Hypertriglyceridemia    DM (diabetes mellitus)    CAD (coronary artery disease)    HTN (hypertension)      HPI:  Mr. BYRON NAJERA is a 54 year M with PMhx  includes DLD, HTN, CAD. Mr. NAJERA was worked up for complaints SOB on exertion. He  had a cardiac catheterization which revealed multivessel disease. On 03/05/25, he underwent surgical myocardial revascularization.    Hospital Course:  3/5 CABG 4 LIMA + Lt Radial  OR C2, Alb1, ,      on Levo and Vaso: Albumin 500 given  post-extubation hypercapnia on BIPAP  chest tube 300ml: Hb 7.7,              1 PRBCs 1 Platelets  3/6 Stable hemodynamics, off pressors, start BB  3/7 started diuresis  3/9 febrile, cultured, thoracentesis done 400cc removed    OBJECTIVE:  ICU Vital Signs Last 24 Hrs  T(C): 36.6 (10 Mar 2025 08:00), Max: 38.1 (09 Mar 2025 18:00)  T(F): 97.9 (10 Mar 2025 08:00), Max: 100.5 (09 Mar 2025 18:00)  HR: 105 (10 Mar 2025 10:00) (82 - 121)  BP: 106/62 (10 Mar 2025 09:00) (91/60 - 143/70)  BP(mean): 79 (10 Mar 2025 09:00) (69 - 106)  ABP: --  ABP(mean): --  RR: 33 (10 Mar 2025 10:00) (11 - 37)  SpO2: 99% (10 Mar 2025 10:00) (94% - 100%)    O2 Parameters below as of 10 Mar 2025 09:00  Patient On (Oxygen Delivery Method): nasal cannula  O2 Flow (L/min): 4        I&O's Summary    09 Mar 2025 07:01  -  10 Mar 2025 07:00  --------------------------------------------------------  IN: 200 mL / OUT: 4000 mL / NET: -3800 mL    10 Mar 2025 07:01  -  10 Mar 2025 10:52  --------------------------------------------------------  IN: 425 mL / OUT: 0 mL / NET: 425 mL      I&O's Detail    09 Mar 2025 07:01  -  10 Mar 2025 07:00  --------------------------------------------------------  IN:    IV PiggyBack: 200 mL  Total IN: 200 mL    OUT:    Other (mL): 400 mL    Voided (mL): 3600 mL  Total OUT: 4000 mL    Total NET: -3800 mL      10 Mar 2025 07:01  -  10 Mar 2025 10:52  --------------------------------------------------------  IN:    IV PiggyBack: 325 mL    Oral Fluid: 100 mL  Total IN: 425 mL    OUT:  Total OUT: 0 mL    Total NET: 425 mL        Adult Advanced Hemodynamics Last 24 Hrs  CVP(mm Hg): --  CVP(cm H2O): --  CO: --  CI: --  PA: --  PA(mean): --  PCWP: --  SVR: --  SVRI: --  PVR: --  PVRI: --    CAPILLARY BLOOD GLUCOSE      POCT Blood Glucose.: 172 mg/dL (10 Mar 2025 07:34)  POCT Blood Glucose.: 184 mg/dL (09 Mar 2025 21:24)  POCT Blood Glucose.: 187 mg/dL (09 Mar 2025 16:49)  POCT Blood Glucose.: 162 mg/dL (09 Mar 2025 11:38)  POCT Blood Glucose.: 27 mg/dL (09 Mar 2025 11:37)    LABS:                          6.9    5.83  )-----------( 191      ( 10 Mar 2025 01:21 )             20.5     03-10    140  |  101  |  13  ----------------------------<  119[H]  3.8   |  30  |  0.6[L]    Ca    8.4      10 Mar 2025 01:21  Mg     2.0     03-10    TPro  5.7[L]  /  Alb  3.4[L]  /  TBili  0.7  /  DBili  x   /  AST  17  /  ALT  18  /  AlkPhos  108  03-10      Urinalysis Basic - ( 10 Mar 2025 01:21 )    Color: x / Appearance: x / SG: x / pH: x  Gluc: 119 mg/dL / Ketone: x  / Bili: x / Urobili: x   Blood: x / Protein: x / Nitrite: x   Leuk Esterase: x / RBC: x / WBC x   Sq Epi: x / Non Sq Epi: x / Bacteria: x        Home Medications:  aspirin 81 mg oral delayed release capsule: orally once a day (05 Mar 2025 06:53)  atorvastatin 80 mg oral tablet: 1 tab(s) orally once a day (05 Mar 2025 06:53)  icosapent 1 g oral capsule: 2 cap(s) orally once a day (05 Mar 2025 06:53)  losartan 25 mg oral tablet: 1 tab(s) orally once a day (05 Mar 2025 06:53)  metFORMIN 500 mg oral tablet: 1 tab(s) orally 2 times a day (05 Mar 2025 06:53)  metoprolol succinate 25 mg oral tablet, extended release: 1 tab(s) orally once a day (05 Mar 2025 06:53)  Ozempic 2 mg/1.5 mL (1 mg dose) subcutaneous solution: subcutaneous once a week (05 Mar 2025 06:53)    HOSPITAL MEDICATIONS:  MEDICATIONS  (STANDING):  ascorbic acid 500 milliGRAM(s) Oral daily  aspirin enteric coated 81 milliGRAM(s) Oral daily  atorvastatin 80 milliGRAM(s) Oral at bedtime  bisacodyl Suppository 10 milliGRAM(s) Rectal once  cefepime   IVPB      cefepime   IVPB 1000 milliGRAM(s) IV Intermittent every 12 hours  chlorhexidine 2% Cloths 1 Application(s) Topical daily  clopidogrel Tablet 75 milliGRAM(s) Oral daily  dextrose 5%. 1000 milliLiter(s) (50 mL/Hr) IV Continuous <Continuous>  dextrose 5%. 1000 milliLiter(s) (100 mL/Hr) IV Continuous <Continuous>  dextrose 50% Injectable 50 milliLiter(s) IV Push every 15 minutes  dextrose 50% Injectable 25 milliLiter(s) IV Push every 15 minutes  diltiazem    Tablet 60 milliGRAM(s) Oral every 8 hours  folic acid 1 milliGRAM(s) Oral daily  furosemide   Injectable 20 milliGRAM(s) IV Push every 12 hours  gabapentin 300 milliGRAM(s) Oral three times a day  glucagon  Injectable 1 milliGRAM(s) IntraMuscular once  heparin   Injectable 5000 Unit(s) SubCutaneous every 8 hours  insulin lispro (ADMELOG) corrective regimen sliding scale   SubCutaneous three times a day before meals  insulin lispro (ADMELOG) corrective regimen sliding scale   SubCutaneous at bedtime  ipratropium    for Nebulization 500 MICROGram(s) Nebulizer every 6 hours  iron sucrose IVPB 200 milliGRAM(s) IV Intermittent every 24 hours  melatonin 5 milliGRAM(s) Oral at bedtime  metoprolol tartrate 25 milliGRAM(s) Oral every 8 hours  pantoprazole    Tablet 40 milliGRAM(s) Oral before breakfast  polyethylene glycol 3350 17 Gram(s) Oral daily  potassium chloride    Tablet ER 40 milliEquivalent(s) Oral daily  senna 2 Tablet(s) Oral at bedtime  sodium chloride 0.9%. 1000 milliLiter(s) (20 mL/Hr) IV Continuous <Continuous>  tamsulosin 0.4 milliGRAM(s) Oral at bedtime    MEDICATIONS  (PRN):  dextrose Oral Gel 15 Gram(s) Oral once PRN Blood Glucose LESS THAN 70 milliGRAM(s)/deciliter  ibuprofen  Tablet. 400 milliGRAM(s) Oral every 6 hours PRN Temp greater or equal to 38C (100.4F)  melatonin 5 milliGRAM(s) Oral at bedtime PRN Insomnia  ondansetron Injectable 4 milliGRAM(s) IV Push every 4 hours PRN Nausea and/or Vomiting  oxyCODONE    IR 5 milliGRAM(s) Oral every 4 hours PRN Moderate Pain (4 - 6)  oxyCODONE    IR 10 milliGRAM(s) Oral every 4 hours PRN Severe Pain (7 - 10)    RADIOLOGY:  Chest X-ray Reviewed    REVIEW OF SYSTEMS:  CONSTITUTIONAL: [X] all negative; [ ] weakness, [ ] fevers, [ ] chills  EYES/ENT: [X] all negative; [ ] visual changes, [ ] vertigo, [ ] throat pain   NECK: [X] all negative; [ ] pain, [ ] stiffness  RESPIRATORY: [] all negative, [ ] cough, [ ] wheezing, [ ] hemoptysis, [ ] shortness of breath  CARDIOVASCULAR: [] all negative; [ ] chest pain, [ ] palpitations, [ ] orthopnea  GASTROINTESTINAL: [X] all negative; [ ]abdominal pain, [ ] nausea, [ ] vomiting, [ ] hematemesis, [ ] diarrhea, [ ] constipation, [ ] melena, [ ] hematochezia.  GENITOURINARY: [X] all negative; [ ] dysuria, [ ] frequency, [ ] hematuria  NEUROLOGICAL: [X] all negative; [ ] numbness, [ ] weakness  SKIN: [X] all negative; [ ] itching, [ ] burning, [ ] rashes, [ ] lesions   All other review of systems is negative unless indicated above.  [  ] Unable to assess ROS because     PHYSICAL EXAM:          CONSTITUTIONAL: Well-developed; well-nourished; in no acute distress.   	SKIN: warm, dry  	HEAD: Normocephalic; atraumatic.  	EYES: PERRL, EOM, no conj injection, sclera clear  	ENT: No nasal discharge; airway clear.  	NECK: Supple; non tender.   	CARD: S1, S2 normal; no murmurs, gallops, or rubs. Regular rate and rhythm.  no carotid bruits  	RESP: CTA B/L; good air movement No wheezes, rales or rhonchi.  	ABD: Soft, not tender, not distended,  no rebound or guarding, bowel sounds present  	EXT: Normal ROM.  No clubbing, cyanosis or edema.   warm and well perfused.  pulses palpable  	NEURO: Alert, awake, motor 5/5 R, 5/5 L

## 2025-03-10 NOTE — CONSULT NOTE ADULT - SUBJECTIVE AND OBJECTIVE BOX
GENERAL SURGERY CONSULT NOTE    Patient: BYRON NAJERA , 54y ([birth year])Male   MRN: [id number]  Location: [address]  Visit: 03-05-25 Inpatient  Date: 03-10-25 @ 17:29    HPI:  54 year M with PMhx  includes DLD, HTN, CAD that was worked up for complaints SOB on exertion. He had a cardiac catheterization which revealed multivessel disease, now s/p CABG. Thoracic surgery consulted for evaluation of left pleural effusion.       PAST MEDICAL & SURGICAL HISTORY:  HLD (hyperlipidemia)      Hypertriglyceridemia      DM (diabetes mellitus)      CAD (coronary artery disease)      HTN (hypertension)          Home Medications:  aspirin 81 mg oral delayed release capsule: orally once a day (05 Mar 2025 06:53)  atorvastatin 80 mg oral tablet: 1 tab(s) orally once a day (05 Mar 2025 06:53)  icosapent 1 g oral capsule: 2 cap(s) orally once a day (05 Mar 2025 06:53)  losartan 25 mg oral tablet: 1 tab(s) orally once a day (05 Mar 2025 06:53)  metFORMIN 500 mg oral tablet: 1 tab(s) orally 2 times a day (05 Mar 2025 06:53)  metoprolol succinate 25 mg oral tablet, extended release: 1 tab(s) orally once a day (05 Mar 2025 06:53)  Ozempic 2 mg/1.5 mL (1 mg dose) subcutaneous solution: subcutaneous once a week (05 Mar 2025 06:53)        VITALS:  T(F): 99.4 (03-10-25 @ 12:00), Max: 100.5 (03-09-25 @ 18:00)  HR: 97 (03-10-25 @ 16:00) (82 - 123)  BP: 117/64 (03-10-25 @ 16:00) (91/60 - 145/80)  RR: 31 (03-10-25 @ 16:00) (11 - 38)  SpO2: 100% (03-10-25 @ 16:00) (97% - 100%)    PHYSICAL EXAM:  General: NAD, AAOx3, calm and cooperative  Cardiac: RRR s/p CABG midline chest wound  Respiratory: CTAB, normal respiratory effort, L pigtail to suction  Abdomen: Soft, non-distended, non-tender, no rebound, no guarding.   Musculoskeletal:  ROM intact, compartments soft    MEDICATIONS  (STANDING):  albumin human 25% IVPB 50 milliLiter(s) IV Intermittent every 6 hours  ascorbic acid 500 milliGRAM(s) Oral daily  aspirin enteric coated 81 milliGRAM(s) Oral daily  atorvastatin 80 milliGRAM(s) Oral at bedtime  bisacodyl Suppository 10 milliGRAM(s) Rectal once  cefepime   IVPB      cefepime   IVPB 1000 milliGRAM(s) IV Intermittent every 12 hours  chlorhexidine 2% Cloths 1 Application(s) Topical daily  dextrose 5%. 1000 milliLiter(s) (50 mL/Hr) IV Continuous <Continuous>  dextrose 5%. 1000 milliLiter(s) (100 mL/Hr) IV Continuous <Continuous>  dextrose 50% Injectable 50 milliLiter(s) IV Push every 15 minutes  dextrose 50% Injectable 25 milliLiter(s) IV Push every 15 minutes  diltiazem    Tablet 60 milliGRAM(s) Oral every 8 hours  folic acid 1 milliGRAM(s) Oral daily  furosemide   Injectable 20 milliGRAM(s) IV Push every 12 hours  gabapentin 300 milliGRAM(s) Oral three times a day  glucagon  Injectable 1 milliGRAM(s) IntraMuscular once  heparin   Injectable 5000 Unit(s) SubCutaneous every 8 hours  HYDROmorphone  Injectable 2 milliGRAM(s) IV Push once  insulin lispro (ADMELOG) corrective regimen sliding scale   SubCutaneous three times a day before meals  insulin lispro (ADMELOG) corrective regimen sliding scale   SubCutaneous at bedtime  ipratropium    for Nebulization 500 MICROGram(s) Nebulizer every 6 hours  iron sucrose IVPB 200 milliGRAM(s) IV Intermittent every 24 hours  melatonin 5 milliGRAM(s) Oral at bedtime  metoprolol tartrate 25 milliGRAM(s) Oral every 8 hours  pantoprazole    Tablet 40 milliGRAM(s) Oral before breakfast  polyethylene glycol 3350 17 Gram(s) Oral daily  potassium chloride    Tablet ER 40 milliEquivalent(s) Oral daily  senna 2 Tablet(s) Oral at bedtime  sodium chloride 0.9%. 1000 milliLiter(s) (20 mL/Hr) IV Continuous <Continuous>  tamsulosin 0.4 milliGRAM(s) Oral at bedtime    MEDICATIONS  (PRN):  dextrose Oral Gel 15 Gram(s) Oral once PRN Blood Glucose LESS THAN 70 milliGRAM(s)/deciliter  ibuprofen  Tablet. 400 milliGRAM(s) Oral every 6 hours PRN Temp greater or equal to 38C (100.4F)  melatonin 5 milliGRAM(s) Oral at bedtime PRN Insomnia  ondansetron Injectable 4 milliGRAM(s) IV Push every 4 hours PRN Nausea and/or Vomiting  oxyCODONE    IR 5 milliGRAM(s) Oral every 4 hours PRN Moderate Pain (4 - 6)  oxyCODONE    IR 10 milliGRAM(s) Oral every 4 hours PRN Severe Pain (7 - 10)      LAB/STUDIES:                        6.9    5.83  )-----------( 191      ( 10 Mar 2025 01:21 )             20.5     03-10    140  |  101  |  13  ----------------------------<  119[H]  3.8   |  30  |  0.6[L]    Ca    8.4      10 Mar 2025 01:21  Mg     2.0     03-10    TPro  5.7[L]  /  Alb  3.4[L]  /  TBili  0.7  /  DBili  x   /  AST  17  /  ALT  18  /  AlkPhos  108  03-10      LIVER FUNCTIONS - ( 10 Mar 2025 01:21 )  Alb: 3.4 g/dL / Pro: 5.7 g/dL / ALK PHOS: 108 U/L / ALT: 18 U/L / AST: 17 U/L / GGT: x           Urinalysis Basic - ( 10 Mar 2025 01:21 )    Color: x / Appearance: x / SG: x / pH: x  Gluc: 119 mg/dL / Ketone: x  / Bili: x / Urobili: x   Blood: x / Protein: x / Nitrite: x   Leuk Esterase: x / RBC: x / WBC x   Sq Epi: x / Non Sq Epi: x / Bacteria: x                  IMAGING:  < from: CT Chest No Cont (03.10.25 @ 09:53) >  IMPRESSION:  Interval development of severe bibasilar atelectasis/consolidation with   underlying mild pleural effusion, greater on the left, compatible with   congestive heart failure.      --- End of Report ---        < end of copied text >      ACCESS DEVICES:  [x ] Peripheral IV  [ ] Central Venous Line	[ ] R	[ ] L	[ ] IJ	[ ] Fem	[ ] SC	Placed:   [ ] Arterial Line		[ ] R	[ ] L	[ ] Fem	[ ] Rad	[ ] Ax	Placed:   [ ] PICC:					[ ] Mediport  [ ] Urinary Catheter, Date Placed:

## 2025-03-10 NOTE — PROCEDURE NOTE - NSPROCDETAILS_GEN_ALL_CORE
Rosalio technique/dressing applied/secured in place/sterile dressing applied
400 Ml Left/location identified, draped/prepped, sterile technique used, needle inserted/introduced/catheter inserted over needle/connection to syringe/connection to evacuated glass bottle/ultrasound assessment of effusion (localization)

## 2025-03-10 NOTE — PROGRESS NOTE ADULT - SUBJECTIVE AND OBJECTIVE BOX
OPERATIVE PROCEDURE(s): CABG x4               POD #5                       SURGEON(s): YARED Babcock      SUBJECTIVE ASSESSMENT:54yMale patient seen and examined at bedside. No complaints at this time    Vital Signs Last 24 Hrs  T(F): 97.9 (10 Mar 2025 08:00), Max: 100.5 (09 Mar 2025 18:00)  HR: 96 (10 Mar 2025 08:00) (82 - 121)  BP: 108/62 (10 Mar 2025 08:00) (91/60 - 143/70)  BP(mean): 80 (10 Mar 2025 08:00) (69 - 106)  RR: 26 (10 Mar 2025 08:00) (11 - 37)  SpO2: 100% (10 Mar 2025 08:00) (94% - 100%)      I&O's Detail    09 Mar 2025 07:01  -  10 Mar 2025 07:00  --------------------------------------------------------  IN:    IV PiggyBack: 200 mL  Total IN: 200 mL    OUT:    Other (mL): 400 mL    Voided (mL): 3600 mL  Total OUT: 4000 mL        Net: I&O's Detail    08 Mar 2025 06:01  -  09 Mar 2025 07:00  --------------------------------------------------------  Total NET: -1080 mL      09 Mar 2025 07:01  -  10 Mar 2025 07:00  --------------------------------------------------------  Total NET: -3800 mL        CAPILLARY BLOOD GLUCOSE      POCT Blood Glucose.: 172 mg/dL (10 Mar 2025 07:34)  POCT Blood Glucose.: 184 mg/dL (09 Mar 2025 21:24)  POCT Blood Glucose.: 187 mg/dL (09 Mar 2025 16:49)  POCT Blood Glucose.: 162 mg/dL (09 Mar 2025 11:38)  POCT Blood Glucose.: 27 mg/dL (09 Mar 2025 11:37)    A1C with Estimated Average Glucose Result: 6.6 % (02-28-25 @ 07:39)      Physical Exam:  General: NAD; A&Ox3  Neuro: pupils equal and reactive, speech clear, no overt sensory or motor deficts  Cardiac: S1/S2, RRR, no murmur, no rubs  Lungs: unlabored shallow respirations, bilateral bases decreased  Abdomen: Soft/NT/ND; positive bowel sounds x 4  Sternum: Intact, no click, incision healing well with no drainage  Incisions: Incisions clean/dry/intact  Extremities: Mild edema b/l lower extremities; good capillary refill; no cyanosis; palpable 1+ pedal pulses b/l    Central Venous Catheter: Yes: critical illness, intravenous access  Day #  Moore Catheter: Yes: critical illness; monitor strict i/o's                    Day #  EPICARDIAL WIRES:  YES                                                                         Day #  BOWEL MOVEMENT:  [] YES [] NO, If No, Timing since last BM Day #  CHEST TUBE(MS/Left/Right):  [] YES [] NO, If yes -  AIR LEAKS:  YES/NO        LABS:                        6.9[LL]  5.83  )-----------( 191      ( 10 Mar 2025 01:21 )             20.5[L]                        6.9[LL]  8.40  )-----------( 203      ( 09 Mar 2025 03:10 )             20.7[L]    03-10    140  |  101  |  13  ----------------------------<  119[H]  3.8   |  30  |  0.6[L]  03-09    136  |  98  |  15  ----------------------------<  113[H]  3.9   |  27  |  0.6[L]    Ca    8.4      10 Mar 2025 01:21  Mg     2.0     03-10    TPro  5.7[L] [6.0 - 8.0]  /  Alb  3.4[L] [3.5 - 5.2]  /  TBili  0.7 [0.2 - 1.2]  /  DBili  x   /  AST  17 [0 - 41]  /  ALT  18 [0 - 41]  /  AlkPhos  108 [30 - 115]  03-10      Urinalysis (03.09.25 @ 18:56)    pH Urine: 6.5   Glucose Qualitative, Urine: Negative mg/dL   Blood, Urine: Negative   Color: Yellow   Urine Appearance: Clear   Bilirubin: Negative   Ketone - Urine: Negative mg/dL   Specific Gravity: 1.005   Protein, Urine: Negative mg/dL   Urobilinogen: 1.0 mg/dL   Nitrite: Negative   Leukocyte Esterase Concentration: Negative        RADIOLOGY & ADDITIONAL TESTS:  CXR:  < from: Xray Chest 1 View- PORTABLE-Urgent (Xray Chest 1 View- PORTABLE-Urgent .) (03.09.25 @ 18:14) >    PROCEDURE DATE:  03/09/2025          INTERPRETATION:  Clinical History / Reason for exam: s/p thoracentesis    Comparison : Chest radiograph: March 8 2025    Technique/Positioning: Frontal view of the chest    Findings/  impression:    Support devices: Unchanged right IJ vascular sheath.    Cardiac/mediastinum/hilum: Unchanged.    Lung parenchyma/Pleura: Unchanged left pleural effusion/opacity. No   pneumothorax.    Skeleton/soft tissues: Unchanged.    Impression:  Unchanged left pleural effusion/opacity. No pneumothorax.         EKG:  < from: 12 Lead ECG (03.09.25 @ 08:20) >    Ventricular Rate 93 BPM    Atrial Rate 93 BPM    P-R Interval 146 ms    QRS Duration 94 ms    Q-T Interval 366 ms    QTC Calculation(Bazett) 455 ms    P Axis 21 degrees    R Axis -12 degrees    T Axis 15 degrees    Diagnosis Line Normal sinus rhythm  Nonspecific ST and T wave abnormality  Abnormal ECG      Allergies    No Known Allergies    Intolerances      MEDICATIONS  (STANDING):  ascorbic acid 500 milliGRAM(s) Oral daily  aspirin enteric coated 325 milliGRAM(s) Oral daily  atorvastatin 80 milliGRAM(s) Oral at bedtime  bisacodyl Suppository 10 milliGRAM(s) Rectal once  cefepime   IVPB      cefepime   IVPB 1000 milliGRAM(s) IV Intermittent every 12 hours  chlorhexidine 2% Cloths 1 Application(s) Topical daily  dextrose 5%. 1000 milliLiter(s) (50 mL/Hr) IV Continuous <Continuous>  dextrose 5%. 1000 milliLiter(s) (100 mL/Hr) IV Continuous <Continuous>  dextrose 50% Injectable 50 milliLiter(s) IV Push every 15 minutes  dextrose 50% Injectable 25 milliLiter(s) IV Push every 15 minutes  diltiazem    Tablet 30 milliGRAM(s) Oral every 8 hours  folic acid 1 milliGRAM(s) Oral daily  furosemide   Injectable 20 milliGRAM(s) IV Push every 12 hours  gabapentin 300 milliGRAM(s) Oral three times a day  glucagon  Injectable 1 milliGRAM(s) IntraMuscular once  heparin   Injectable 5000 Unit(s) SubCutaneous every 8 hours  insulin lispro (ADMELOG) corrective regimen sliding scale   SubCutaneous three times a day before meals  insulin lispro (ADMELOG) corrective regimen sliding scale   SubCutaneous at bedtime  ipratropium    for Nebulization 500 MICROGram(s) Nebulizer every 6 hours  iron sucrose IVPB 200 milliGRAM(s) IV Intermittent every 24 hours  melatonin 5 milliGRAM(s) Oral at bedtime  metoprolol tartrate 25 milliGRAM(s) Oral every 8 hours  pantoprazole    Tablet 40 milliGRAM(s) Oral before breakfast  polyethylene glycol 3350 17 Gram(s) Oral daily  potassium chloride    Tablet ER 40 milliEquivalent(s) Oral daily  senna 2 Tablet(s) Oral at bedtime  sodium chloride 0.9%. 1000 milliLiter(s) (20 mL/Hr) IV Continuous <Continuous>  tamsulosin 0.4 milliGRAM(s) Oral at bedtime    MEDICATIONS  (PRN):  dextrose Oral Gel 15 Gram(s) Oral once PRN Blood Glucose LESS THAN 70 milliGRAM(s)/deciliter  ibuprofen  Tablet. 400 milliGRAM(s) Oral every 6 hours PRN Temp greater or equal to 38C (100.4F)  melatonin 5 milliGRAM(s) Oral at bedtime PRN Insomnia  ondansetron Injectable 4 milliGRAM(s) IV Push every 4 hours PRN Nausea and/or Vomiting  oxyCODONE    IR 5 milliGRAM(s) Oral every 4 hours PRN Moderate Pain (4 - 6)  oxyCODONE    IR 10 milliGRAM(s) Oral every 4 hours PRN Severe Pain (7 - 10)    Home Medications:  aspirin 81 mg oral delayed release capsule: orally once a day (05 Mar 2025 06:53)  atorvastatin 80 mg oral tablet: 1 tab(s) orally once a day (05 Mar 2025 06:53)  icosapent 1 g oral capsule: 2 cap(s) orally once a day (05 Mar 2025 06:53)  losartan 25 mg oral tablet: 1 tab(s) orally once a day (05 Mar 2025 06:53)  metFORMIN 500 mg oral tablet: 1 tab(s) orally 2 times a day (05 Mar 2025 06:53)  metoprolol succinate 25 mg oral tablet, extended release: 1 tab(s) orally once a day (05 Mar 2025 06:53)  Ozempic 2 mg/1.5 mL (1 mg dose) subcutaneous solution: subcutaneous once a week (05 Mar 2025 06:53)      Pharmacologic DVT Prophylaxis [] YES, [] NO: Contraindication:  SCD's: YES b/l    GI Prophylaxis: protonix    Post-Op Beta-Blockers: [X] Yes, [] No: contraindication:  Post-Op Aspirin:  [X] Yes, [] No: contraindication:  Post-Op Statin:  [X] Yes, [] No: contraindication:    Ambulation/Activity Status: ambulate as tolerated    Assessment/Plan:  54y Male status-post CABG x4               POD #5    - Case and plan discussed with CTU Intensivist and CT Surgeon - Dr. Babcock  - Continue CTU supportive care and ongoing plan of care as per continuing CTU rounds.   - Continue DVT/GI prophylaxis  - Incentive Spirometry 10 times an hour  - Continue to advance physical activity as tolerated and continue PT/OT as directed  1. CAD s/p CABG: Continue ASA, statin, BB. Continue diuresis with Lasix 20mg qd.   2. HTN: Continue metoprolol 25mg bid. Add cardizem 30mg q8h for radial artery spasm ppx  3. Post-op A. Fib ppx: monitor electrolytes, leave wires.   4. COPD/Hypoxia/Atelectasis on CXR: supplemental o2 prn, encourage incentive spirometry, start Ipratropium  5. DM/Glucose Control (A1c 6.6): insulin sliding scale  6. Anemia 2/2 acute blood loss, pt asx: monitor H/H, active T&S, continue folic acid and IV iron      Social Service Disposition:   OPERATIVE PROCEDURE(s): CABG x4               POD #5                       SURGEON(s): YARED Babcock      SUBJECTIVE ASSESSMENT:54yMale patient seen and examined at bedside. No complaints at this time    Vital Signs Last 24 Hrs  T(F): 97.9 (10 Mar 2025 08:00), Max: 100.5 (09 Mar 2025 18:00)  HR: 96 (10 Mar 2025 08:00) (82 - 121)  BP: 108/62 (10 Mar 2025 08:00) (91/60 - 143/70)  BP(mean): 80 (10 Mar 2025 08:00) (69 - 106)  RR: 26 (10 Mar 2025 08:00) (11 - 37)  SpO2: 100% (10 Mar 2025 08:00) (94% - 100%)      I&O's Detail    09 Mar 2025 07:01  -  10 Mar 2025 07:00  --------------------------------------------------------  IN:    IV PiggyBack: 200 mL  Total IN: 200 mL    OUT:    Other (mL): 400 mL    Voided (mL): 3600 mL  Total OUT: 4000 mL        Net: I&O's Detail    08 Mar 2025 06:01  -  09 Mar 2025 07:00  --------------------------------------------------------  Total NET: -1080 mL      09 Mar 2025 07:01  -  10 Mar 2025 07:00  --------------------------------------------------------  Total NET: -3800 mL        CAPILLARY BLOOD GLUCOSE      POCT Blood Glucose.: 172 mg/dL (10 Mar 2025 07:34)  POCT Blood Glucose.: 184 mg/dL (09 Mar 2025 21:24)  POCT Blood Glucose.: 187 mg/dL (09 Mar 2025 16:49)  POCT Blood Glucose.: 162 mg/dL (09 Mar 2025 11:38)  POCT Blood Glucose.: 27 mg/dL (09 Mar 2025 11:37)    A1C with Estimated Average Glucose Result: 6.6 % (02-28-25 @ 07:39)      Physical Exam:  General: NAD; A&Ox3  Neuro: pupils equal and reactive, speech clear, no overt sensory or motor deficits  Cardiac: S1/S2, RRR, no murmur, no rubs  Lungs: unlabored shallow respirations, bilateral bases decreased  Abdomen: Soft/NT/ND; positive bowel sounds x 4  Sternum: Intact, no click, incision healing well with no drainage  Incisions: Incisions clean/dry/intact  Extremities: Mild edema b/l lower extremities; good capillary refill; no cyanosis; palpable 1+ pedal pulses b/l    Central Venous Catheter: Yes: critical illness, intravenous access  Day # 5  EPICARDIAL WIRES:  YES                                                                         Day # 5  BOWEL MOVEMENT:  [X] YES [] NO, If No, Timing since last BM Day #        LABS:                        6.9[LL]  5.83  )-----------( 191      ( 10 Mar 2025 01:21 )             20.5[L]                        6.9[LL]  8.40  )-----------( 203      ( 09 Mar 2025 03:10 )             20.7[L]    03-10    140  |  101  |  13  ----------------------------<  119[H]  3.8   |  30  |  0.6[L]  03-09    136  |  98  |  15  ----------------------------<  113[H]  3.9   |  27  |  0.6[L]    Ca    8.4      10 Mar 2025 01:21  Mg     2.0     03-10    TPro  5.7[L] [6.0 - 8.0]  /  Alb  3.4[L] [3.5 - 5.2]  /  TBili  0.7 [0.2 - 1.2]  /  DBili  x   /  AST  17 [0 - 41]  /  ALT  18 [0 - 41]  /  AlkPhos  108 [30 - 115]  03-10      Urinalysis (03.09.25 @ 18:56)    pH Urine: 6.5   Glucose Qualitative, Urine: Negative mg/dL   Blood, Urine: Negative   Color: Yellow   Urine Appearance: Clear   Bilirubin: Negative   Ketone - Urine: Negative mg/dL   Specific Gravity: 1.005   Protein, Urine: Negative mg/dL   Urobilinogen: 1.0 mg/dL   Nitrite: Negative   Leukocyte Esterase Concentration: Negative        RADIOLOGY & ADDITIONAL TESTS:  CXR:  < from: Xray Chest 1 View- PORTABLE-Urgent (Xray Chest 1 View- PORTABLE-Urgent .) (03.09.25 @ 18:14) >    PROCEDURE DATE:  03/09/2025          INTERPRETATION:  Clinical History / Reason for exam: s/p thoracentesis    Comparison : Chest radiograph: March 8 2025    Technique/Positioning: Frontal view of the chest    Findings/  impression:    Support devices: Unchanged right IJ vascular sheath.    Cardiac/mediastinum/hilum: Unchanged.    Lung parenchyma/Pleura: Unchanged left pleural effusion/opacity. No   pneumothorax.    Skeleton/soft tissues: Unchanged.    Impression:  Unchanged left pleural effusion/opacity. No pneumothorax.         EKG:  < from: 12 Lead ECG (03.09.25 @ 08:20) >    Ventricular Rate 93 BPM    Atrial Rate 93 BPM    P-R Interval 146 ms    QRS Duration 94 ms    Q-T Interval 366 ms    QTC Calculation(Bazett) 455 ms    P Axis 21 degrees    R Axis -12 degrees    T Axis 15 degrees    Diagnosis Line Normal sinus rhythm  Nonspecific ST and T wave abnormality  Abnormal ECG      Allergies    No Known Allergies    Intolerances      MEDICATIONS  (STANDING):  ascorbic acid 500 milliGRAM(s) Oral daily  aspirin enteric coated 325 milliGRAM(s) Oral daily  atorvastatin 80 milliGRAM(s) Oral at bedtime  bisacodyl Suppository 10 milliGRAM(s) Rectal once  cefepime   IVPB      cefepime   IVPB 1000 milliGRAM(s) IV Intermittent every 12 hours  chlorhexidine 2% Cloths 1 Application(s) Topical daily  dextrose 5%. 1000 milliLiter(s) (50 mL/Hr) IV Continuous <Continuous>  dextrose 5%. 1000 milliLiter(s) (100 mL/Hr) IV Continuous <Continuous>  dextrose 50% Injectable 50 milliLiter(s) IV Push every 15 minutes  dextrose 50% Injectable 25 milliLiter(s) IV Push every 15 minutes  diltiazem    Tablet 30 milliGRAM(s) Oral every 8 hours  folic acid 1 milliGRAM(s) Oral daily  furosemide   Injectable 20 milliGRAM(s) IV Push every 12 hours  gabapentin 300 milliGRAM(s) Oral three times a day  glucagon  Injectable 1 milliGRAM(s) IntraMuscular once  heparin   Injectable 5000 Unit(s) SubCutaneous every 8 hours  insulin lispro (ADMELOG) corrective regimen sliding scale   SubCutaneous three times a day before meals  insulin lispro (ADMELOG) corrective regimen sliding scale   SubCutaneous at bedtime  ipratropium    for Nebulization 500 MICROGram(s) Nebulizer every 6 hours  iron sucrose IVPB 200 milliGRAM(s) IV Intermittent every 24 hours  melatonin 5 milliGRAM(s) Oral at bedtime  metoprolol tartrate 25 milliGRAM(s) Oral every 8 hours  pantoprazole    Tablet 40 milliGRAM(s) Oral before breakfast  polyethylene glycol 3350 17 Gram(s) Oral daily  potassium chloride    Tablet ER 40 milliEquivalent(s) Oral daily  senna 2 Tablet(s) Oral at bedtime  sodium chloride 0.9%. 1000 milliLiter(s) (20 mL/Hr) IV Continuous <Continuous>  tamsulosin 0.4 milliGRAM(s) Oral at bedtime    MEDICATIONS  (PRN):  dextrose Oral Gel 15 Gram(s) Oral once PRN Blood Glucose LESS THAN 70 milliGRAM(s)/deciliter  ibuprofen  Tablet. 400 milliGRAM(s) Oral every 6 hours PRN Temp greater or equal to 38C (100.4F)  melatonin 5 milliGRAM(s) Oral at bedtime PRN Insomnia  ondansetron Injectable 4 milliGRAM(s) IV Push every 4 hours PRN Nausea and/or Vomiting  oxyCODONE    IR 5 milliGRAM(s) Oral every 4 hours PRN Moderate Pain (4 - 6)  oxyCODONE    IR 10 milliGRAM(s) Oral every 4 hours PRN Severe Pain (7 - 10)    Home Medications:  aspirin 81 mg oral delayed release capsule: orally once a day (05 Mar 2025 06:53)  atorvastatin 80 mg oral tablet: 1 tab(s) orally once a day (05 Mar 2025 06:53)  icosapent 1 g oral capsule: 2 cap(s) orally once a day (05 Mar 2025 06:53)  losartan 25 mg oral tablet: 1 tab(s) orally once a day (05 Mar 2025 06:53)  metFORMIN 500 mg oral tablet: 1 tab(s) orally 2 times a day (05 Mar 2025 06:53)  metoprolol succinate 25 mg oral tablet, extended release: 1 tab(s) orally once a day (05 Mar 2025 06:53)  Ozempic 2 mg/1.5 mL (1 mg dose) subcutaneous solution: subcutaneous once a week (05 Mar 2025 06:53)      Pharmacologic DVT Prophylaxis [X] YES, [] NO: Contraindication:  SCD's: YES b/l    GI Prophylaxis: protonix    Post-Op Beta-Blockers: [X] Yes, [] No: contraindication:  Post-Op Aspirin:  [X] Yes, [] No: contraindication:  Post-Op Statin:  [X] Yes, [] No: contraindication:    Ambulation/Activity Status: ambulate as tolerated    Assessment/Plan:  54y Male status-post CABG x4               POD #5    - Case and plan discussed with CTU Intensivist and CT Surgeon - Dr. Babcock  - Continue CTU supportive care and ongoing plan of care as per continuing CTU rounds.   - Continue DVT/GI prophylaxis  - Incentive Spirometry 10 times an hour  - Continue to advance physical activity as tolerated and continue PT/OT as directed  1. CAD s/p CABG: Continue ASA, statin, BB. Continue diuresis with Lasix 20mg qd.   2. HTN: Continue metoprolol 25mg bid. cardizem increased 60mg q8h   3. Post-op A. Fib ppx: monitor electrolytes, leave wires.   4. COPD/Hypoxia/Atelectasis on CXR: supplemental o2 needed, continue nebs, encourage cough and deep breathing  5. DM/Glucose Control (A1c 6.6): insulin sliding scale  6. Anemia 2/2 acute blood loss, pt asx: monitor H/H, active T&S, continue folic acid and IV iron  7. Febrile overnight: Tmax 38.1, blood cx and UA sent. cordis d/c'd. CT chest non con ordered.        Social Service Disposition: anticipate d/c home with home care in 24-48 hours   OPERATIVE PROCEDURE(s): CABG x4               POD #5                       SURGEON(s): YARED Babcock      SUBJECTIVE ASSESSMENT:54yMale patient seen and examined at bedside. No complaints at this time    Vital Signs Last 24 Hrs  T(F): 97.9 (10 Mar 2025 08:00), Max: 100.5 (09 Mar 2025 18:00)  HR: 96 (10 Mar 2025 08:00) (82 - 121)  BP: 108/62 (10 Mar 2025 08:00) (91/60 - 143/70)  BP(mean): 80 (10 Mar 2025 08:00) (69 - 106)  RR: 26 (10 Mar 2025 08:00) (11 - 37)  SpO2: 100% (10 Mar 2025 08:00) (94% - 100%)      I&O's Detail    09 Mar 2025 07:01  -  10 Mar 2025 07:00  --------------------------------------------------------  IN:    IV PiggyBack: 200 mL  Total IN: 200 mL    OUT:    Other (mL): 400 mL    Voided (mL): 3600 mL  Total OUT: 4000 mL        Net: I&O's Detail    08 Mar 2025 06:01  -  09 Mar 2025 07:00  --------------------------------------------------------  Total NET: -1080 mL      09 Mar 2025 07:01  -  10 Mar 2025 07:00  --------------------------------------------------------  Total NET: -3800 mL        CAPILLARY BLOOD GLUCOSE      POCT Blood Glucose.: 172 mg/dL (10 Mar 2025 07:34)  POCT Blood Glucose.: 184 mg/dL (09 Mar 2025 21:24)  POCT Blood Glucose.: 187 mg/dL (09 Mar 2025 16:49)  POCT Blood Glucose.: 162 mg/dL (09 Mar 2025 11:38)  POCT Blood Glucose.: 27 mg/dL (09 Mar 2025 11:37)    A1C with Estimated Average Glucose Result: 6.6 % (02-28-25 @ 07:39)      Physical Exam:  General: NAD; A&Ox3  Neuro: pupils equal and reactive, speech clear, no overt sensory or motor deficits  Cardiac: S1/S2, RRR, no murmur, no rubs  Lungs: unlabored shallow respirations, bilateral bases decreased  Abdomen: Soft/NT/ND; positive bowel sounds x 4  Sternum: Intact, no click, incision healing well with no drainage  Incisions: Incisions clean/dry/intact  Extremities: Mild edema b/l lower extremities; good capillary refill; no cyanosis; palpable 1+ pedal pulses b/l    Central Venous Catheter: Yes: critical illness, intravenous access  Day # 5  EPICARDIAL WIRES:  YES                                                                         Day # 5  BOWEL MOVEMENT:  [X] YES [] NO, If No, Timing since last BM Day #        LABS:                        6.9[LL]  5.83  )-----------( 191      ( 10 Mar 2025 01:21 )             20.5[L]                        6.9[LL]  8.40  )-----------( 203      ( 09 Mar 2025 03:10 )             20.7[L]    03-10    140  |  101  |  13  ----------------------------<  119[H]  3.8   |  30  |  0.6[L]  03-09    136  |  98  |  15  ----------------------------<  113[H]  3.9   |  27  |  0.6[L]    Ca    8.4      10 Mar 2025 01:21  Mg     2.0     03-10    TPro  5.7[L] [6.0 - 8.0]  /  Alb  3.4[L] [3.5 - 5.2]  /  TBili  0.7 [0.2 - 1.2]  /  DBili  x   /  AST  17 [0 - 41]  /  ALT  18 [0 - 41]  /  AlkPhos  108 [30 - 115]  03-10      Urinalysis (03.09.25 @ 18:56)    pH Urine: 6.5   Glucose Qualitative, Urine: Negative mg/dL   Blood, Urine: Negative   Color: Yellow   Urine Appearance: Clear   Bilirubin: Negative   Ketone - Urine: Negative mg/dL   Specific Gravity: 1.005   Protein, Urine: Negative mg/dL   Urobilinogen: 1.0 mg/dL   Nitrite: Negative   Leukocyte Esterase Concentration: Negative        RADIOLOGY & ADDITIONAL TESTS:  CXR:  < from: Xray Chest 1 View- PORTABLE-Urgent (Xray Chest 1 View- PORTABLE-Urgent .) (03.09.25 @ 18:14) >    PROCEDURE DATE:  03/09/2025          INTERPRETATION:  Clinical History / Reason for exam: s/p thoracentesis    Comparison : Chest radiograph: March 8 2025    Technique/Positioning: Frontal view of the chest    Findings/  impression:    Support devices: Unchanged right IJ vascular sheath.    Cardiac/mediastinum/hilum: Unchanged.    Lung parenchyma/Pleura: Unchanged left pleural effusion/opacity. No   pneumothorax.    Skeleton/soft tissues: Unchanged.    Impression:  Unchanged left pleural effusion/opacity. No pneumothorax.         EKG:  < from: 12 Lead ECG (03.09.25 @ 08:20) >    Ventricular Rate 93 BPM    Atrial Rate 93 BPM    P-R Interval 146 ms    QRS Duration 94 ms    Q-T Interval 366 ms    QTC Calculation(Bazett) 455 ms    P Axis 21 degrees    R Axis -12 degrees    T Axis 15 degrees    Diagnosis Line Normal sinus rhythm  Nonspecific ST and T wave abnormality  Abnormal ECG      Allergies    No Known Allergies    Intolerances      MEDICATIONS  (STANDING):  ascorbic acid 500 milliGRAM(s) Oral daily  aspirin enteric coated 325 milliGRAM(s) Oral daily  atorvastatin 80 milliGRAM(s) Oral at bedtime  bisacodyl Suppository 10 milliGRAM(s) Rectal once  cefepime   IVPB      cefepime   IVPB 1000 milliGRAM(s) IV Intermittent every 12 hours  chlorhexidine 2% Cloths 1 Application(s) Topical daily  dextrose 5%. 1000 milliLiter(s) (50 mL/Hr) IV Continuous <Continuous>  dextrose 5%. 1000 milliLiter(s) (100 mL/Hr) IV Continuous <Continuous>  dextrose 50% Injectable 50 milliLiter(s) IV Push every 15 minutes  dextrose 50% Injectable 25 milliLiter(s) IV Push every 15 minutes  diltiazem    Tablet 30 milliGRAM(s) Oral every 8 hours  folic acid 1 milliGRAM(s) Oral daily  furosemide   Injectable 20 milliGRAM(s) IV Push every 12 hours  gabapentin 300 milliGRAM(s) Oral three times a day  glucagon  Injectable 1 milliGRAM(s) IntraMuscular once  heparin   Injectable 5000 Unit(s) SubCutaneous every 8 hours  insulin lispro (ADMELOG) corrective regimen sliding scale   SubCutaneous three times a day before meals  insulin lispro (ADMELOG) corrective regimen sliding scale   SubCutaneous at bedtime  ipratropium    for Nebulization 500 MICROGram(s) Nebulizer every 6 hours  iron sucrose IVPB 200 milliGRAM(s) IV Intermittent every 24 hours  melatonin 5 milliGRAM(s) Oral at bedtime  metoprolol tartrate 25 milliGRAM(s) Oral every 8 hours  pantoprazole    Tablet 40 milliGRAM(s) Oral before breakfast  polyethylene glycol 3350 17 Gram(s) Oral daily  potassium chloride    Tablet ER 40 milliEquivalent(s) Oral daily  senna 2 Tablet(s) Oral at bedtime  sodium chloride 0.9%. 1000 milliLiter(s) (20 mL/Hr) IV Continuous <Continuous>  tamsulosin 0.4 milliGRAM(s) Oral at bedtime    MEDICATIONS  (PRN):  dextrose Oral Gel 15 Gram(s) Oral once PRN Blood Glucose LESS THAN 70 milliGRAM(s)/deciliter  ibuprofen  Tablet. 400 milliGRAM(s) Oral every 6 hours PRN Temp greater or equal to 38C (100.4F)  melatonin 5 milliGRAM(s) Oral at bedtime PRN Insomnia  ondansetron Injectable 4 milliGRAM(s) IV Push every 4 hours PRN Nausea and/or Vomiting  oxyCODONE    IR 5 milliGRAM(s) Oral every 4 hours PRN Moderate Pain (4 - 6)  oxyCODONE    IR 10 milliGRAM(s) Oral every 4 hours PRN Severe Pain (7 - 10)    Home Medications:  aspirin 81 mg oral delayed release capsule: orally once a day (05 Mar 2025 06:53)  atorvastatin 80 mg oral tablet: 1 tab(s) orally once a day (05 Mar 2025 06:53)  icosapent 1 g oral capsule: 2 cap(s) orally once a day (05 Mar 2025 06:53)  losartan 25 mg oral tablet: 1 tab(s) orally once a day (05 Mar 2025 06:53)  metFORMIN 500 mg oral tablet: 1 tab(s) orally 2 times a day (05 Mar 2025 06:53)  metoprolol succinate 25 mg oral tablet, extended release: 1 tab(s) orally once a day (05 Mar 2025 06:53)  Ozempic 2 mg/1.5 mL (1 mg dose) subcutaneous solution: subcutaneous once a week (05 Mar 2025 06:53)      Pharmacologic DVT Prophylaxis [X] YES, [] NO: Contraindication:  SCD's: YES b/l    GI Prophylaxis: protonix    Post-Op Beta-Blockers: [X] Yes, [] No: contraindication:  Post-Op Aspirin:  [X] Yes, [] No: contraindication:  Post-Op Statin:  [X] Yes, [] No: contraindication:    Ambulation/Activity Status: ambulate as tolerated    Assessment/Plan:  54y Male status-post CABG x4               POD #5    - Case and plan discussed with CTU Intensivist and CT Surgeon - Dr. Babcock  - Continue CTU supportive care and ongoing plan of care as per continuing CTU rounds.   - Continue DVT/GI prophylaxis  - Incentive Spirometry 10 times an hour  - Continue to advance physical activity as tolerated and continue PT/OT as directed  1. CAD s/p CABG: Continue ASA, statin, BB. Continue diuresis with Lasix 20mg qd.   2. HTN: Continue metoprolol 25mg bid. cardizem increased 60mg q8h   3. Post-op A. Fib ppx: monitor electrolytes, leave wires.   4. COPD/Hypoxia/Atelectasis on CXR: supplemental o2 needed, continue nebs, encourage cough and deep breathing  5. DM/Glucose Control (A1c 6.6): insulin sliding scale  6. Anemia 2/2 acute blood loss, pt asx: monitor H/H, active T&S, continue folic acid and IV iron  7. Febrile overnight: Tmax 38.1, blood cx and UA sent. cordis d/c'd. CT chest non con ordered.    s/p radial artery harvest- cannot give norvasc now due to borderline bp ; the patient needs more rate control with cardizem and lopressor      Social Service Disposition: anticipate d/c home with home care in 24-48 hours

## 2025-03-11 LAB
ALBUMIN SERPL ELPH-MCNC: 3.4 G/DL — LOW (ref 3.5–5.2)
ALP SERPL-CCNC: 136 U/L — HIGH (ref 30–115)
ALT FLD-CCNC: 36 U/L — SIGNIFICANT CHANGE UP (ref 0–41)
ANION GAP SERPL CALC-SCNC: 11 MMOL/L — SIGNIFICANT CHANGE UP (ref 7–14)
AST SERPL-CCNC: 25 U/L — SIGNIFICANT CHANGE UP (ref 0–41)
BASOPHILS # BLD AUTO: 0 K/UL — SIGNIFICANT CHANGE UP (ref 0–0.2)
BASOPHILS NFR BLD AUTO: 0 % — SIGNIFICANT CHANGE UP (ref 0–1)
BILIRUB SERPL-MCNC: 0.6 MG/DL — SIGNIFICANT CHANGE UP (ref 0.2–1.2)
BLD GP AB SCN SERPL QL: SIGNIFICANT CHANGE UP
BUN SERPL-MCNC: 13 MG/DL — SIGNIFICANT CHANGE UP (ref 10–20)
CALCIUM SERPL-MCNC: 8.1 MG/DL — LOW (ref 8.4–10.5)
CHLORIDE SERPL-SCNC: 99 MMOL/L — SIGNIFICANT CHANGE UP (ref 98–110)
CO2 SERPL-SCNC: 26 MMOL/L — SIGNIFICANT CHANGE UP (ref 17–32)
CREAT SERPL-MCNC: 0.8 MG/DL — SIGNIFICANT CHANGE UP (ref 0.7–1.5)
EGFR: 106 ML/MIN/1.73M2 — SIGNIFICANT CHANGE UP
EGFR: 106 ML/MIN/1.73M2 — SIGNIFICANT CHANGE UP
EOSINOPHIL # BLD AUTO: 0.33 K/UL — SIGNIFICANT CHANGE UP (ref 0–0.7)
EOSINOPHIL NFR BLD AUTO: 3.9 % — SIGNIFICANT CHANGE UP (ref 0–8)
GLUCOSE BLDC GLUCOMTR-MCNC: 166 MG/DL — HIGH (ref 70–99)
GLUCOSE BLDC GLUCOMTR-MCNC: 180 MG/DL — HIGH (ref 70–99)
GLUCOSE BLDC GLUCOMTR-MCNC: 208 MG/DL — HIGH (ref 70–99)
GLUCOSE BLDC GLUCOMTR-MCNC: 245 MG/DL — HIGH (ref 70–99)
GLUCOSE BLDC GLUCOMTR-MCNC: 249 MG/DL — HIGH (ref 70–99)
GLUCOSE SERPL-MCNC: 184 MG/DL — HIGH (ref 70–99)
HCT VFR BLD CALC: 19.3 % — LOW (ref 42–52)
HCT VFR BLD CALC: 19.6 % — LOW (ref 42–52)
HGB BLD-MCNC: 6.4 G/DL — CRITICAL LOW (ref 14–18)
HGB BLD-MCNC: 6.6 G/DL — CRITICAL LOW (ref 14–18)
IMM GRANULOCYTES NFR BLD AUTO: 1.7 % — HIGH (ref 0.1–0.3)
LYMPHOCYTES # BLD AUTO: 1.36 K/UL — SIGNIFICANT CHANGE UP (ref 1.2–3.4)
LYMPHOCYTES # BLD AUTO: 16.1 % — LOW (ref 20.5–51.1)
MAGNESIUM SERPL-MCNC: 1.9 MG/DL — SIGNIFICANT CHANGE UP (ref 1.8–2.4)
MCHC RBC-ENTMCNC: 26.3 PG — LOW (ref 27–31)
MCHC RBC-ENTMCNC: 27 PG — SIGNIFICANT CHANGE UP (ref 27–31)
MCHC RBC-ENTMCNC: 33.2 G/DL — SIGNIFICANT CHANGE UP (ref 32–37)
MCHC RBC-ENTMCNC: 33.7 G/DL — SIGNIFICANT CHANGE UP (ref 32–37)
MCV RBC AUTO: 79.4 FL — LOW (ref 80–94)
MCV RBC AUTO: 80.3 FL — SIGNIFICANT CHANGE UP (ref 80–94)
MONOCYTES # BLD AUTO: 1.02 K/UL — HIGH (ref 0.1–0.6)
MONOCYTES NFR BLD AUTO: 12.1 % — HIGH (ref 1.7–9.3)
NEUTROPHILS # BLD AUTO: 5.58 K/UL — SIGNIFICANT CHANGE UP (ref 1.4–6.5)
NEUTROPHILS NFR BLD AUTO: 66.2 % — SIGNIFICANT CHANGE UP (ref 42.2–75.2)
NRBC BLD AUTO-RTO: 1 /100 WBCS — HIGH (ref 0–0)
NRBC BLD AUTO-RTO: 1 /100 WBCS — HIGH (ref 0–0)
PLATELET # BLD AUTO: 230 K/UL — SIGNIFICANT CHANGE UP (ref 130–400)
PLATELET # BLD AUTO: 249 K/UL — SIGNIFICANT CHANGE UP (ref 130–400)
PMV BLD: 8.8 FL — SIGNIFICANT CHANGE UP (ref 7.4–10.4)
PMV BLD: 9.1 FL — SIGNIFICANT CHANGE UP (ref 7.4–10.4)
POTASSIUM SERPL-MCNC: 4.6 MMOL/L — SIGNIFICANT CHANGE UP (ref 3.5–5)
POTASSIUM SERPL-SCNC: 4.6 MMOL/L — SIGNIFICANT CHANGE UP (ref 3.5–5)
PROCALCITONIN SERPL-MCNC: 0.57 NG/ML — HIGH (ref 0.02–0.1)
PROT SERPL-MCNC: 5.8 G/DL — LOW (ref 6–8)
RBC # BLD: 2.43 M/UL — LOW (ref 4.7–6.1)
RBC # BLD: 2.44 M/UL — LOW (ref 4.7–6.1)
RBC # FLD: 15.9 % — HIGH (ref 11.5–14.5)
RBC # FLD: 16.3 % — HIGH (ref 11.5–14.5)
SODIUM SERPL-SCNC: 136 MMOL/L — SIGNIFICANT CHANGE UP (ref 135–146)
WBC # BLD: 8.12 K/UL — SIGNIFICANT CHANGE UP (ref 4.8–10.8)
WBC # BLD: 8.43 K/UL — SIGNIFICANT CHANGE UP (ref 4.8–10.8)
WBC # FLD AUTO: 8.12 K/UL — SIGNIFICANT CHANGE UP (ref 4.8–10.8)
WBC # FLD AUTO: 8.43 K/UL — SIGNIFICANT CHANGE UP (ref 4.8–10.8)

## 2025-03-11 PROCEDURE — 93010 ELECTROCARDIOGRAM REPORT: CPT

## 2025-03-11 PROCEDURE — 71045 X-RAY EXAM CHEST 1 VIEW: CPT | Mod: 26,76

## 2025-03-11 PROCEDURE — 99291 CRITICAL CARE FIRST HOUR: CPT

## 2025-03-11 RX ORDER — MAGNESIUM SULFATE 500 MG/ML
1 SYRINGE (ML) INJECTION ONCE
Refills: 0 | Status: COMPLETED | OUTPATIENT
Start: 2025-03-11 | End: 2025-03-11

## 2025-03-11 RX ORDER — INFLUENZA A VIRUS A/IDAHO/07/2018 (H1N1) ANTIGEN (MDCK CELL DERIVED, PROPIOLACTONE INACTIVATED, INFLUENZA A VIRUS A/INDIANA/08/2018 (H3N2) ANTIGEN (MDCK CELL DERIVED, PROPIOLACTONE INACTIVATED), INFLUENZA B VIRUS B/SINGAPORE/INFTT-16-0610/2016 ANTIGEN (MDCK CELL DERIVED, PROPIOLACTONE INACTIVATED), INFLUENZA B VIRUS B/IOWA/06/2017 ANTIGEN (MDCK CELL DERIVED, PROPIOLACTONE INACTIVATED) 15; 15; 15; 15 UG/.5ML; UG/.5ML; UG/.5ML; UG/.5ML
0.5 INJECTION, SUSPENSION INTRAMUSCULAR ONCE
Refills: 0 | Status: DISCONTINUED | OUTPATIENT
Start: 2025-03-11 | End: 2025-03-14

## 2025-03-11 RX ORDER — ACETAMINOPHEN 500 MG/5ML
1000 LIQUID (ML) ORAL ONCE
Refills: 0 | Status: COMPLETED | OUTPATIENT
Start: 2025-03-11 | End: 2025-03-11

## 2025-03-11 RX ORDER — IRON SUCROSE 20 MG/ML
200 INJECTION, SOLUTION INTRAVENOUS EVERY 24 HOURS
Refills: 0 | Status: DISCONTINUED | OUTPATIENT
Start: 2025-03-11 | End: 2025-03-14

## 2025-03-11 RX ADMIN — Medication 1000 MILLIGRAM(S): at 18:13

## 2025-03-11 RX ADMIN — Medication 81 MILLIGRAM(S): at 12:14

## 2025-03-11 RX ADMIN — OXYCODONE HYDROCHLORIDE 10 MILLIGRAM(S): 30 TABLET ORAL at 13:53

## 2025-03-11 RX ADMIN — Medication 1 APPLICATION(S): at 06:46

## 2025-03-11 RX ADMIN — FOLIC ACID 1 MILLIGRAM(S): 1 TABLET ORAL at 12:13

## 2025-03-11 RX ADMIN — Medication 2 TABLET(S): at 21:30

## 2025-03-11 RX ADMIN — METOPROLOL SUCCINATE 25 MILLIGRAM(S): 50 TABLET, EXTENDED RELEASE ORAL at 00:21

## 2025-03-11 RX ADMIN — Medication 40 MILLIEQUIVALENT(S): at 12:14

## 2025-03-11 RX ADMIN — Medication 500 MICROGRAM(S): at 14:51

## 2025-03-11 RX ADMIN — METOPROLOL SUCCINATE 50 MILLIGRAM(S): 50 TABLET, EXTENDED RELEASE ORAL at 06:45

## 2025-03-11 RX ADMIN — METOPROLOL SUCCINATE 50 MILLIGRAM(S): 50 TABLET, EXTENDED RELEASE ORAL at 16:17

## 2025-03-11 RX ADMIN — Medication 400 MILLIGRAM(S): at 18:11

## 2025-03-11 RX ADMIN — Medication 500 MICROGRAM(S): at 02:37

## 2025-03-11 RX ADMIN — OXYCODONE HYDROCHLORIDE 5 MILLIGRAM(S): 30 TABLET ORAL at 23:48

## 2025-03-11 RX ADMIN — Medication 5 MILLIGRAM(S): at 21:31

## 2025-03-11 RX ADMIN — Medication 100 GRAM(S): at 05:09

## 2025-03-11 RX ADMIN — GABAPENTIN 300 MILLIGRAM(S): 400 CAPSULE ORAL at 16:17

## 2025-03-11 RX ADMIN — Medication 40 MILLIGRAM(S): at 06:46

## 2025-03-11 RX ADMIN — OXYCODONE HYDROCHLORIDE 10 MILLIGRAM(S): 30 TABLET ORAL at 14:53

## 2025-03-11 RX ADMIN — GABAPENTIN 300 MILLIGRAM(S): 400 CAPSULE ORAL at 21:32

## 2025-03-11 RX ADMIN — ALBUMIN (HUMAN) 50 MILLILITER(S): 12.5 INJECTION, SOLUTION INTRAVENOUS at 06:47

## 2025-03-11 RX ADMIN — HEPARIN SODIUM 5000 UNIT(S): 1000 INJECTION INTRAVENOUS; SUBCUTANEOUS at 21:30

## 2025-03-11 RX ADMIN — TAMSULOSIN HYDROCHLORIDE 0.4 MILLIGRAM(S): 0.4 CAPSULE ORAL at 21:31

## 2025-03-11 RX ADMIN — FUROSEMIDE 20 MILLIGRAM(S): 10 INJECTION INTRAMUSCULAR; INTRAVENOUS at 17:08

## 2025-03-11 RX ADMIN — OXYCODONE HYDROCHLORIDE 10 MILLIGRAM(S): 30 TABLET ORAL at 00:00

## 2025-03-11 RX ADMIN — INSULIN LISPRO 2: 100 INJECTION, SOLUTION INTRAVENOUS; SUBCUTANEOUS at 11:33

## 2025-03-11 RX ADMIN — CEFEPIME 100 MILLIGRAM(S): 2 INJECTION, POWDER, FOR SOLUTION INTRAVENOUS at 17:19

## 2025-03-11 RX ADMIN — CEFEPIME 100 MILLIGRAM(S): 2 INJECTION, POWDER, FOR SOLUTION INTRAVENOUS at 05:08

## 2025-03-11 RX ADMIN — ATORVASTATIN CALCIUM 80 MILLIGRAM(S): 80 TABLET, FILM COATED ORAL at 21:31

## 2025-03-11 RX ADMIN — FUROSEMIDE 20 MILLIGRAM(S): 10 INJECTION INTRAMUSCULAR; INTRAVENOUS at 06:46

## 2025-03-11 RX ADMIN — OXYCODONE HYDROCHLORIDE 10 MILLIGRAM(S): 30 TABLET ORAL at 09:45

## 2025-03-11 RX ADMIN — METOPROLOL SUCCINATE 50 MILLIGRAM(S): 50 TABLET, EXTENDED RELEASE ORAL at 21:31

## 2025-03-11 RX ADMIN — Medication 500 MILLIGRAM(S): at 12:14

## 2025-03-11 RX ADMIN — POLYETHYLENE GLYCOL 3350 17 GRAM(S): 17 POWDER, FOR SOLUTION ORAL at 12:14

## 2025-03-11 RX ADMIN — DILTIAZEM HYDROCHLORIDE 60 MILLIGRAM(S): 240 TABLET, EXTENDED RELEASE ORAL at 06:45

## 2025-03-11 RX ADMIN — Medication 500 MICROGRAM(S): at 19:44

## 2025-03-11 RX ADMIN — IRON SUCROSE 100 MILLIGRAM(S): 20 INJECTION, SOLUTION INTRAVENOUS at 12:15

## 2025-03-11 RX ADMIN — OXYCODONE HYDROCHLORIDE 10 MILLIGRAM(S): 30 TABLET ORAL at 08:47

## 2025-03-11 RX ADMIN — INSULIN LISPRO 2: 100 INJECTION, SOLUTION INTRAVENOUS; SUBCUTANEOUS at 08:26

## 2025-03-11 RX ADMIN — GABAPENTIN 300 MILLIGRAM(S): 400 CAPSULE ORAL at 06:46

## 2025-03-11 RX ADMIN — INSULIN LISPRO 2: 100 INJECTION, SOLUTION INTRAVENOUS; SUBCUTANEOUS at 16:21

## 2025-03-11 NOTE — PROGRESS NOTE ADULT - SUBJECTIVE AND OBJECTIVE BOX
CTU Attending Progress Daily Note       Procedure: CABG  POD#: 6    He has history of HLD (hyperlipidemia)    Hypertriglyceridemia    DM (diabetes mellitus)    CAD (coronary artery disease)    HTN (hypertension)      HPI:  Mr. BYRON NAJERA is a 54 year M with PMhx  includes DLD, HTN, CAD. Mr. NAJERA was worked up for complaints SOB on exertion. He  had a cardiac catheterization which revealed multivessel disease. On 03/05/25, he underwent surgical myocardial revascularization.    Hospital Course:  3/5 CABG 4 LIMA + Lt Radial  OR C2, Alb1, ,      on Levo and Vaso: Albumin 500 given  post-extubation hypercapnia on BIPAP  chest tube 300ml: Hb 7.7,              1 PRBCs 1 Platelets  3/6 Stable hemodynamics, off pressors, start BB  3/7 started diuresis  3/9 febrile, cultured, thoracentesis done 400cc removed  3/10 CT chest -> chest tube by thoracic surgery, lopressors / cardizem increased     OBJECTIVE:  ICU Vital Signs Last 24 Hrs  T(C): 37.3 (11 Mar 2025 12:00), Max: 37.4 (10 Mar 2025 12:00)  T(F): 99.1 (11 Mar 2025 12:00), Max: 99.4 (10 Mar 2025 12:00)  HR: 92 (11 Mar 2025 11:00) (85 - 123)  BP: 122/55 (11 Mar 2025 11:00) (101/58 - 149/71)  BP(mean): 79 (11 Mar 2025 11:00) (75 - 110)  ABP: --  ABP(mean): --  RR: 27 (11 Mar 2025 11:00) (9 - 38)  SpO2: 100% (11 Mar 2025 11:00) (99% - 100%)    O2 Parameters below as of 11 Mar 2025 11:00  Patient On (Oxygen Delivery Method): nasal cannula  O2 Flow (L/min): 2        I&O's Summary    10 Mar 2025 07:01  -  11 Mar 2025 07:00  --------------------------------------------------------  IN: 1275 mL / OUT: 1740 mL / NET: -465 mL    11 Mar 2025 07:01  -  11 Mar 2025 11:49  --------------------------------------------------------  IN: 150 mL / OUT: 300 mL / NET: -150 mL      I&O's Detail    10 Mar 2025 07:01  -  11 Mar 2025 07:00  --------------------------------------------------------  IN:    IV PiggyBack: 425 mL    Oral Fluid: 850 mL  Total IN: 1275 mL    OUT:    Chest Tube (mL): 190 mL    Voided (mL): 1550 mL  Total OUT: 1740 mL    Total NET: -465 mL      11 Mar 2025 07:01  -  11 Mar 2025 11:49  --------------------------------------------------------  IN:    Oral Fluid: 150 mL  Total IN: 150 mL    OUT:    Voided (mL): 300 mL  Total OUT: 300 mL    Total NET: -150 mL        Adult Advanced Hemodynamics Last 24 Hrs  CVP(mm Hg): --  CVP(cm H2O): --  CO: --  CI: --  PA: --  PA(mean): --  PCWP: --  SVR: --  SVRI: --  PVR: --  PVRI: --    CAPILLARY BLOOD GLUCOSE      POCT Blood Glucose.: 245 mg/dL (11 Mar 2025 11:31)  POCT Blood Glucose.: 249 mg/dL (11 Mar 2025 08:24)  POCT Blood Glucose.: 166 mg/dL (11 Mar 2025 06:33)  POCT Blood Glucose.: 190 mg/dL (10 Mar 2025 22:38)  POCT Blood Glucose.: 255 mg/dL (10 Mar 2025 16:34)    LABS:                          6.4    8.12  )-----------( 249      ( 11 Mar 2025 04:25 )             19.3     03-11    136  |  99  |  13  ----------------------------<  184[H]  4.6   |  26  |  0.8    Ca    8.1[L]      11 Mar 2025 02:36  Mg     1.9     03-11    TPro  5.8[L]  /  Alb  3.4[L]  /  TBili  0.6  /  DBili  x   /  AST  25  /  ALT  36  /  AlkPhos  136[H]  03-11      Urinalysis Basic - ( 11 Mar 2025 02:36 )    Color: x / Appearance: x / SG: x / pH: x  Gluc: 184 mg/dL / Ketone: x  / Bili: x / Urobili: x   Blood: x / Protein: x / Nitrite: x   Leuk Esterase: x / RBC: x / WBC x   Sq Epi: x / Non Sq Epi: x / Bacteria: x        Culture - Blood (collected 09 Mar 2025 21:12)  Source: Blood Blood  Preliminary Report (11 Mar 2025 04:01):    No growth at 24 hours    Culture - Blood (collected 09 Mar 2025 18:46)  Source: Blood Blood  Preliminary Report (11 Mar 2025 02:03):    No growth at 24 hours      Home Medications:  aspirin 81 mg oral delayed release capsule: orally once a day (05 Mar 2025 06:53)  atorvastatin 80 mg oral tablet: 1 tab(s) orally once a day (05 Mar 2025 06:53)  icosapent 1 g oral capsule: 2 cap(s) orally once a day (05 Mar 2025 06:53)  losartan 25 mg oral tablet: 1 tab(s) orally once a day (05 Mar 2025 06:53)  metFORMIN 500 mg oral tablet: 1 tab(s) orally 2 times a day (05 Mar 2025 06:53)  metoprolol succinate 25 mg oral tablet, extended release: 1 tab(s) orally once a day (05 Mar 2025 06:53)  Ozempic 2 mg/1.5 mL (1 mg dose) subcutaneous solution: subcutaneous once a week (05 Mar 2025 06:53)    HOSPITAL MEDICATIONS:  MEDICATIONS  (STANDING):  ascorbic acid 500 milliGRAM(s) Oral daily  aspirin enteric coated 81 milliGRAM(s) Oral daily  atorvastatin 80 milliGRAM(s) Oral at bedtime  bisacodyl Suppository 10 milliGRAM(s) Rectal once  cefepime   IVPB      cefepime   IVPB 1000 milliGRAM(s) IV Intermittent every 12 hours  chlorhexidine 2% Cloths 1 Application(s) Topical daily  dextrose 5%. 1000 milliLiter(s) (50 mL/Hr) IV Continuous <Continuous>  dextrose 5%. 1000 milliLiter(s) (100 mL/Hr) IV Continuous <Continuous>  dextrose 50% Injectable 50 milliLiter(s) IV Push every 15 minutes  dextrose 50% Injectable 25 milliLiter(s) IV Push every 15 minutes  diltiazem    Tablet 60 milliGRAM(s) Oral every 8 hours  folic acid 1 milliGRAM(s) Oral daily  furosemide   Injectable 20 milliGRAM(s) IV Push every 12 hours  gabapentin 300 milliGRAM(s) Oral three times a day  glucagon  Injectable 1 milliGRAM(s) IntraMuscular once  heparin   Injectable 5000 Unit(s) SubCutaneous every 8 hours  influenza   Vaccine 0.5 milliLiter(s) IntraMuscular once  insulin lispro (ADMELOG) corrective regimen sliding scale   SubCutaneous three times a day before meals  insulin lispro (ADMELOG) corrective regimen sliding scale   SubCutaneous at bedtime  ipratropium    for Nebulization 500 MICROGram(s) Nebulizer every 6 hours  iron sucrose IVPB 200 milliGRAM(s) IV Intermittent every 24 hours  melatonin 5 milliGRAM(s) Oral at bedtime  metoprolol tartrate 50 milliGRAM(s) Oral every 8 hours  pantoprazole    Tablet 40 milliGRAM(s) Oral before breakfast  polyethylene glycol 3350 17 Gram(s) Oral daily  potassium chloride    Tablet ER 40 milliEquivalent(s) Oral daily  senna 2 Tablet(s) Oral at bedtime  tamsulosin 0.4 milliGRAM(s) Oral at bedtime    MEDICATIONS  (PRN):  dextrose Oral Gel 15 Gram(s) Oral once PRN Blood Glucose LESS THAN 70 milliGRAM(s)/deciliter  ibuprofen  Tablet. 400 milliGRAM(s) Oral every 6 hours PRN Temp greater or equal to 38C (100.4F)  melatonin 5 milliGRAM(s) Oral at bedtime PRN Insomnia  ondansetron Injectable 4 milliGRAM(s) IV Push every 4 hours PRN Nausea and/or Vomiting  oxyCODONE    IR 5 milliGRAM(s) Oral every 4 hours PRN Moderate Pain (4 - 6)  oxyCODONE    IR 10 milliGRAM(s) Oral every 4 hours PRN Severe Pain (7 - 10)    RADIOLOGY:  Chest X-ray Reviewed    REVIEW OF SYSTEMS:  CONSTITUTIONAL: [X] all negative; [ ] weakness, [ ] fevers, [ ] chills  EYES/ENT: [X] all negative; [ ] visual changes, [ ] vertigo, [ ] throat pain   NECK: [X] all negative; [ ] pain, [ ] stiffness  RESPIRATORY: [] all negative, [ ] cough, [ ] wheezing, [ ] hemoptysis, [ ] shortness of breath  CARDIOVASCULAR: [] all negative; [ ] chest pain, [ ] palpitations, [ ] orthopnea  GASTROINTESTINAL: [X] all negative; [ ]abdominal pain, [ ] nausea, [ ] vomiting, [ ] hematemesis, [ ] diarrhea, [ ] constipation, [ ] melena, [ ] hematochezia.  GENITOURINARY: [X] all negative; [ ] dysuria, [ ] frequency, [ ] hematuria  NEUROLOGICAL: [X] all negative; [ ] numbness, [ ] weakness  SKIN: [X] all negative; [ ] itching, [ ] burning, [ ] rashes, [ ] lesions   All other review of systems is negative unless indicated above.  [  ] Unable to assess ROS because     PHYSICAL EXAM:          CONSTITUTIONAL: Well-developed; well-nourished; in no acute distress.   	SKIN: warm, dry  	HEAD: Normocephalic; atraumatic.  	EYES: PERRL, EOM, no conj injection, sclera clear  	ENT: No nasal discharge; airway clear.  	NECK: Supple; non tender.   	CARD: S1, S2 normal; no murmurs, gallops, or rubs. Regular rate and rhythm.  no carotid bruits  	RESP: CTA B/L; good air movement No wheezes, rales or rhonchi.  	ABD: Soft, not tender, not distended,  no rebound or guarding, bowel sounds present  	EXT: Normal ROM.  No clubbing, cyanosis or edema.   warm and well perfused.  pulses palpable  	NEURO: Alert, awake, motor 5/5 R, 5/5 L

## 2025-03-11 NOTE — PROGRESS NOTE ADULT - ASSESSMENT
Assessment   s/p CABG POD 6      Plan:    Neuro:  Multimodal pain management  Tylenol, Lidoderm patch, gabapentin, opiates as needed  Monitor neuro status in the post op period    CV:  S/P CABG  ASA bet blockers started Statein  HTN / tachy  Lopressor 50Q8  Cardizem 60Q8  Monitor perfusion, , lactate, UOP, index and MVO2   Maintain MAP > 65  Continue diuresis    Resp:  L lung atelactasis - CT chest with loculated effusion, atelactasis  S/p chest tube by thoracic surgery - not draining - no air leak - will remove today  Supplemental oxygen to maintain sats > 92%  Continuous pulse oximetry monitoring  IS / Chest PT  Nebulizers as needed  Flutter valve  Nebulizers  IPV    GI:  Advance diet - cardiac  Bowel Regimen - escalate as needed  PUD ppx per protocol    Renal  High risk for TERENCE post surgery  Monitor UOP, lytes, creatinine  lasix today - goal negative 1L   Keep K > 4, Mg > 2    Endo:  Tight glucose control per CTICU protocol  SSI    Heme:  Acute blood loss anemia post surgery  IV iron / FA / MVI  Hgb low - monitor  DVT ppx    ID:  Cefepime for possible PNA  F/up cultures  MRSA negative  UA negative  Monitor fever curve and WBC count      Upon my evaluation, the above patient has a high probability of imminent or life threatening deterioration due to a high risk for Shock, Cardiogenic shock, arrythmia, acute blood loss, acute kidney injury and acute pulmonary insufficiency which required my direct attention, intervention, and personal management.  Total critical care time indicated in the note includes review of radiology results, ordering, interpreting and reviewing diagnostic studies / lab tests with immediate assessment and treatment, consultant collaboration on findings and treatment options, monitoring for potential decompensation, obtaining history from family, EMT, nursing home staff and/or treating physicians; directing the titration of pressors, oxygen support devices, fluid resuscitation, interpretation of cardiac output measurements, interpretation of radiological assessments, interpretation of EKG / rhythm strips, telemetry.  This time did not overlap with the management of other patients or performing separately reportable procedures.      Management of this patient was discussed with the CT Surgery/Thoracic surgery/Structural Cardiology attending and mid-level providers.    I acknowledge the use of copied documentation.  All copy forward documentation is my own and has been reviewed and revised as appropriate.  If no changes were made, it is because that information remains the same.

## 2025-03-11 NOTE — PROGRESS NOTE ADULT - SUBJECTIVE AND OBJECTIVE BOX
GENERAL SURGERY PROGRESS NOTE    Patient: BYRON NAJERA , 54y ([birth year])Male   MRN: [id number]  Location: [address]  Visit: 03-05-25 Inpatient  Date: 03-11-25 @ 01:12      Procedure/Dx/Injuries: left pleural effusion, s/p pigtail placement       PAST MEDICAL & SURGICAL HISTORY:  HLD (hyperlipidemia)      Hypertriglyceridemia      DM (diabetes mellitus)      CAD (coronary artery disease)      HTN (hypertension)          Vitals:   T(F): 99.2 (03-10-25 @ 20:00), Max: 99.4 (03-10-25 @ 12:00)  HR: 111 (03-10-25 @ 23:00)  BP: 149/71 (03-10-25 @ 23:00)  RR: 33 (03-10-25 @ 23:00)  SpO2: 100% (03-10-25 @ 23:00)      Diet, DASH/TLC:   Sodium & Cholesterol Restricted  Consistent Carbohydrate Evening Snack (CSTCHOSN)      Fluids:     I & O's:    03-09-25 @ 07:01  -  03-10-25 @ 07:00  --------------------------------------------------------  IN:    IV PiggyBack: 200 mL  Total IN: 200 mL    OUT:    Other (mL): 400 mL    Voided (mL): 3600 mL  Total OUT: 4000 mL    Total NET: -3800 mL        Bowel Movement: : [] YES [] NO  Flatus: : [] YES [] NO    PHYSICAL EXAM:  General: NAD, AAOx3,   Cardiac:  S1, S2,   Respiratory: b/l breath sounds, left pigtail to suction, -airleak  Abdomen: Soft, non-distended, non-tender,   Vascular:  extremities well perfused      MEDICATIONS  (STANDING):  albumin human 25% IVPB 50 milliLiter(s) IV Intermittent every 6 hours  ascorbic acid 500 milliGRAM(s) Oral daily  aspirin enteric coated 81 milliGRAM(s) Oral daily  atorvastatin 80 milliGRAM(s) Oral at bedtime  bisacodyl Suppository 10 milliGRAM(s) Rectal once  cefepime   IVPB      cefepime   IVPB 1000 milliGRAM(s) IV Intermittent every 12 hours  chlorhexidine 2% Cloths 1 Application(s) Topical daily  dextrose 5%. 1000 milliLiter(s) (50 mL/Hr) IV Continuous <Continuous>  dextrose 5%. 1000 milliLiter(s) (100 mL/Hr) IV Continuous <Continuous>  dextrose 50% Injectable 50 milliLiter(s) IV Push every 15 minutes  dextrose 50% Injectable 25 milliLiter(s) IV Push every 15 minutes  diltiazem    Tablet 60 milliGRAM(s) Oral every 8 hours  folic acid 1 milliGRAM(s) Oral daily  furosemide   Injectable 20 milliGRAM(s) IV Push every 12 hours  gabapentin 300 milliGRAM(s) Oral three times a day  glucagon  Injectable 1 milliGRAM(s) IntraMuscular once  heparin   Injectable 5000 Unit(s) SubCutaneous every 8 hours  insulin lispro (ADMELOG) corrective regimen sliding scale   SubCutaneous three times a day before meals  insulin lispro (ADMELOG) corrective regimen sliding scale   SubCutaneous at bedtime  ipratropium    for Nebulization 500 MICROGram(s) Nebulizer every 6 hours  iron sucrose IVPB 200 milliGRAM(s) IV Intermittent every 24 hours  melatonin 5 milliGRAM(s) Oral at bedtime  metoprolol tartrate 50 milliGRAM(s) Oral every 8 hours  pantoprazole    Tablet 40 milliGRAM(s) Oral before breakfast  polyethylene glycol 3350 17 Gram(s) Oral daily  potassium chloride    Tablet ER 40 milliEquivalent(s) Oral daily  senna 2 Tablet(s) Oral at bedtime  sodium chloride 0.9%. 1000 milliLiter(s) (20 mL/Hr) IV Continuous <Continuous>  tamsulosin 0.4 milliGRAM(s) Oral at bedtime    MEDICATIONS  (PRN):  dextrose Oral Gel 15 Gram(s) Oral once PRN Blood Glucose LESS THAN 70 milliGRAM(s)/deciliter  ibuprofen  Tablet. 400 milliGRAM(s) Oral every 6 hours PRN Temp greater or equal to 38C (100.4F)  melatonin 5 milliGRAM(s) Oral at bedtime PRN Insomnia  ondansetron Injectable 4 milliGRAM(s) IV Push every 4 hours PRN Nausea and/or Vomiting  oxyCODONE    IR 5 milliGRAM(s) Oral every 4 hours PRN Moderate Pain (4 - 6)  oxyCODONE    IR 10 milliGRAM(s) Oral every 4 hours PRN Severe Pain (7 - 10)      DVT PROPHYLAXIS: heparin   Injectable 5000 Unit(s) SubCutaneous every 8 hours    GI PROPHYLAXIS: pantoprazole    Tablet 40 milliGRAM(s) Oral before breakfast    ANTICOAGULATION:   ANTIBIOTICS:  cefepime   IVPB    cefepime   IVPB 1000 milliGRAM(s)            LAB/STUDIES:  Labs:  CAPILLARY BLOOD GLUCOSE      POCT Blood Glucose.: 190 mg/dL (10 Mar 2025 22:38)  POCT Blood Glucose.: 255 mg/dL (10 Mar 2025 16:34)  POCT Blood Glucose.: 216 mg/dL (10 Mar 2025 11:26)  POCT Blood Glucose.: 172 mg/dL (10 Mar 2025 07:34)                          6.9    5.83  )-----------( 191      ( 10 Mar 2025 01:21 )             20.5       Auto Immature Granulocyte %: 1.0 % (03-10-25 @ 01:21)    03-10    140  |  101  |  13  ----------------------------<  119[H]  3.8   |  30  |  0.6[L]      Calcium: 8.4 mg/dL (03-10-25 @ 01:21)      LFTs:             5.7  | 0.7  | 17       ------------------[108     ( 10 Mar 2025 01:21 )  3.4  | x    | 18          Lipase:x      Amylase:x           ABG - ( 06 Mar 2025 06:46 )  pH: 7.42  /  pCO2: 38    /  pO2: 165   / HCO3: 25    / Base Excess: 0.2   /  SaO2: 99.8            ABG - ( 06 Mar 2025 04:17 )  pH: 7.34  /  pCO2: 47    /  pO2: 128   / HCO3: 25    / Base Excess: -0.5  /  SaO2: 98.9            ABG - ( 06 Mar 2025 02:23 )  pH: 7.23  /  pCO2: 54    /  pO2: 158   / HCO3: 23    / Base Excess: -4.9  /  SaO2: 99.5        Urinalysis Basic - ( 10 Mar 2025 01:21 )    Color: x / Appearance: x / SG: x / pH: x  Gluc: 119 mg/dL / Ketone: x  / Bili: x / Urobili: x   Blood: x / Protein: x / Nitrite: x   Leuk Esterase: x / RBC: x / WBC x   Sq Epi: x / Non Sq Epi: x / Bacteria: x        IMAGING:  < from: Xray Chest 1 View-PORTABLE IMMEDIATE (Xray Chest 1 View-PORTABLE IMMEDIATE .) (03.10.25 @ 17:28) >    Impression:    No significant change in bilateral pleural effusions, opacities, post   left chest tube placement.    --- End of Report ---    < end of copied text >           GENERAL SURGERY PROGRESS NOTE    Patient: BYRON NAJERA , 54y ([birth year])Male   MRN: [id number]  Location: [address]  Visit: 03-05-25 Inpatient  Date: 03-11-25 @ 01:12      Procedure/Dx/Injuries: left pleural effusion, s/p pigtail placement     24 hour events: No acute overnight events. Hgb decreased to 6.4 for which pt is receiving 2 units PRBC. Otherwise hemodynamically stable.     PAST MEDICAL & SURGICAL HISTORY:  HLD (hyperlipidemia)  Hypertriglyceridemia  DM (diabetes mellitus)  CAD (coronary artery disease)  HTN (hypertension)    Vitals:   T(F): 99.2 (03-10-25 @ 20:00), Max: 99.4 (03-10-25 @ 12:00)  HR: 111 (03-10-25 @ 23:00)  BP: 149/71 (03-10-25 @ 23:00)  RR: 33 (03-10-25 @ 23:00)  SpO2: 100% (03-10-25 @ 23:00)      Diet, DASH/TLC:   Sodium & Cholesterol Restricted  Consistent Carbohydrate Evening Snack (CSTCHOSN)      Fluids:     I & O's:    03-09-25 @ 07:01  -  03-10-25 @ 07:00  --------------------------------------------------------  IN:    IV PiggyBack: 200 mL  Total IN: 200 mL    OUT:    Other (mL): 400 mL    Voided (mL): 3600 mL  Total OUT: 4000 mL    Total NET: -3800 mL           PHYSICAL EXAM:  General: NAD, AAOx3,   Cardiac:  S1, S2,   Respiratory: b/l breath sounds, left pigtail to suction, -airleak  Abdomen: Soft, non-distended, non-tender,   Vascular:  extremities well perfused      MEDICATIONS  (STANDING):  albumin human 25% IVPB 50 milliLiter(s) IV Intermittent every 6 hours  ascorbic acid 500 milliGRAM(s) Oral daily  aspirin enteric coated 81 milliGRAM(s) Oral daily  atorvastatin 80 milliGRAM(s) Oral at bedtime  bisacodyl Suppository 10 milliGRAM(s) Rectal once  cefepime   IVPB      cefepime   IVPB 1000 milliGRAM(s) IV Intermittent every 12 hours  chlorhexidine 2% Cloths 1 Application(s) Topical daily  dextrose 5%. 1000 milliLiter(s) (50 mL/Hr) IV Continuous <Continuous>  dextrose 5%. 1000 milliLiter(s) (100 mL/Hr) IV Continuous <Continuous>  dextrose 50% Injectable 50 milliLiter(s) IV Push every 15 minutes  dextrose 50% Injectable 25 milliLiter(s) IV Push every 15 minutes  diltiazem    Tablet 60 milliGRAM(s) Oral every 8 hours  folic acid 1 milliGRAM(s) Oral daily  furosemide   Injectable 20 milliGRAM(s) IV Push every 12 hours  gabapentin 300 milliGRAM(s) Oral three times a day  glucagon  Injectable 1 milliGRAM(s) IntraMuscular once  heparin   Injectable 5000 Unit(s) SubCutaneous every 8 hours  insulin lispro (ADMELOG) corrective regimen sliding scale   SubCutaneous three times a day before meals  insulin lispro (ADMELOG) corrective regimen sliding scale   SubCutaneous at bedtime  ipratropium    for Nebulization 500 MICROGram(s) Nebulizer every 6 hours  iron sucrose IVPB 200 milliGRAM(s) IV Intermittent every 24 hours  melatonin 5 milliGRAM(s) Oral at bedtime  metoprolol tartrate 50 milliGRAM(s) Oral every 8 hours  pantoprazole    Tablet 40 milliGRAM(s) Oral before breakfast  polyethylene glycol 3350 17 Gram(s) Oral daily  potassium chloride    Tablet ER 40 milliEquivalent(s) Oral daily  senna 2 Tablet(s) Oral at bedtime  sodium chloride 0.9%. 1000 milliLiter(s) (20 mL/Hr) IV Continuous <Continuous>  tamsulosin 0.4 milliGRAM(s) Oral at bedtime    MEDICATIONS  (PRN):  dextrose Oral Gel 15 Gram(s) Oral once PRN Blood Glucose LESS THAN 70 milliGRAM(s)/deciliter  ibuprofen  Tablet. 400 milliGRAM(s) Oral every 6 hours PRN Temp greater or equal to 38C (100.4F)  melatonin 5 milliGRAM(s) Oral at bedtime PRN Insomnia  ondansetron Injectable 4 milliGRAM(s) IV Push every 4 hours PRN Nausea and/or Vomiting  oxyCODONE    IR 5 milliGRAM(s) Oral every 4 hours PRN Moderate Pain (4 - 6)  oxyCODONE    IR 10 milliGRAM(s) Oral every 4 hours PRN Severe Pain (7 - 10)      DVT PROPHYLAXIS: heparin   Injectable 5000 Unit(s) SubCutaneous every 8 hours    GI PROPHYLAXIS: pantoprazole    Tablet 40 milliGRAM(s) Oral before breakfast    ANTICOAGULATION:   ANTIBIOTICS:  cefepime   IVPB    cefepime   IVPB 1000 milliGRAM(s)            LAB/STUDIES:  Labs:  CAPILLARY BLOOD GLUCOSE      POCT Blood Glucose.: 190 mg/dL (10 Mar 2025 22:38)  POCT Blood Glucose.: 255 mg/dL (10 Mar 2025 16:34)  POCT Blood Glucose.: 216 mg/dL (10 Mar 2025 11:26)  POCT Blood Glucose.: 172 mg/dL (10 Mar 2025 07:34)                          6.9    5.83  )-----------( 191      ( 10 Mar 2025 01:21 )             20.5       Auto Immature Granulocyte %: 1.0 % (03-10-25 @ 01:21)    03-10    140  |  101  |  13  ----------------------------<  119[H]  3.8   |  30  |  0.6[L]      Calcium: 8.4 mg/dL (03-10-25 @ 01:21)      LFTs:             5.7  | 0.7  | 17       ------------------[108     ( 10 Mar 2025 01:21 )  3.4  | x    | 18          Lipase:x      Amylase:x           ABG - ( 06 Mar 2025 06:46 )  pH: 7.42  /  pCO2: 38    /  pO2: 165   / HCO3: 25    / Base Excess: 0.2   /  SaO2: 99.8            ABG - ( 06 Mar 2025 04:17 )  pH: 7.34  /  pCO2: 47    /  pO2: 128   / HCO3: 25    / Base Excess: -0.5  /  SaO2: 98.9            ABG - ( 06 Mar 2025 02:23 )  pH: 7.23  /  pCO2: 54    /  pO2: 158   / HCO3: 23    / Base Excess: -4.9  /  SaO2: 99.5        Urinalysis Basic - ( 10 Mar 2025 01:21 )    Color: x / Appearance: x / SG: x / pH: x  Gluc: 119 mg/dL / Ketone: x  / Bili: x / Urobili: x   Blood: x / Protein: x / Nitrite: x   Leuk Esterase: x / RBC: x / WBC x   Sq Epi: x / Non Sq Epi: x / Bacteria: x        IMAGING:  < from: Xray Chest 1 View-PORTABLE IMMEDIATE (Xray Chest 1 View-PORTABLE IMMEDIATE .) (03.10.25 @ 17:28) >    Impression:    No significant change in bilateral pleural effusions, opacities, post   left chest tube placement.    --- End of Report ---    < end of copied text >

## 2025-03-11 NOTE — PROGRESS NOTE ADULT - ASSESSMENT
ASSESSMENT:  54y M w/ PMHx of DLD, HTN, CAD, POD#6 s/p CABG found to have left pleural effusion, s/p pigtail placement       PLAN:  pigtail to suction   Daily AM cxr  monitor output  monitor for airleak  monitor O2 sat  incentive spirometry  pain control  SCDs  DVT/GI prophylaxis    spectra [id number] ASSESSMENT:  54y M w/ PMHx of DLD, HTN, CAD, POD#6 s/p CABG found to have left pleural effusion, s/p pigtail placement with bloody output, no airleak. Pigtail removed on 3/11, encounter bleeding upon post pull however follow up CXR was unremarkable.     PLAN:  monitor O2 sat  incentive spirometry  pain control  SCDs  DVT/GI prophylaxis  thoracic surgery signing off  Recall thoracic surgery prn     spectra [phone]

## 2025-03-11 NOTE — PROGRESS NOTE ADULT - SUBJECTIVE AND OBJECTIVE BOX
OPERATIVE PROCEDURE(s):    CABGx4 + left radial harvest            POD #     6                  54yMale  SURGEON(s): YARED aBbcock  SUBJECTIVE ASSESSMENT: pt seen and examined. no acute complaints at this time.   Vital Signs Last 24 Hrs  T(F): 99.2 (11 Mar 2025 08:00), Max: 99.4 (10 Mar 2025 12:00)  HR: 93 (11 Mar 2025 09:00) (85 - 123)  BP: 116/64 (11 Mar 2025 09:00) (101/58 - 149/71)  BP(mean): 83 (11 Mar 2025 09:00) (75 - 110)  RR: 21 (11 Mar 2025 09:00) (9 - 38)  SpO2: 100% (11 Mar 2025 09:00) (99% - 100%)      I&O's Detail    10 Mar 2025 07:01  -  11 Mar 2025 07:00  --------------------------------------------------------  IN:    IV PiggyBack: 425 mL    Oral Fluid: 850 mL  Total IN: 1275 mL    OUT:    Chest Tube (mL): 190 mL    Voided (mL): 1550 mL  Total OUT: 1740 mL        Net:   I&O's Detail    09 Mar 2025 07:01  -  10 Mar 2025 07:00  --------------------------------------------------------  Total NET: -3800 mL      10 Mar 2025 07:01  -  11 Mar 2025 07:00  --------------------------------------------------------  Total NET: -465 mL        CAPILLARY BLOOD GLUCOSE      POCT Blood Glucose.: 249 mg/dL (11 Mar 2025 08:24)  POCT Blood Glucose.: 166 mg/dL (11 Mar 2025 06:33)  POCT Blood Glucose.: 190 mg/dL (10 Mar 2025 22:38)  POCT Blood Glucose.: 255 mg/dL (10 Mar 2025 16:34)  POCT Blood Glucose.: 216 mg/dL (10 Mar 2025 11:26)    Physical Exam:  General: NAD; A&Ox3  Neuro: pupils equal and reactive, speech clear, no overt sensory or motor deficits  Cardiac: S1/S2, RRR, no murmur, no rubs  Lungs: unlabored shallow respirations, bilateral bases decreased  Abdomen: Soft/NT/ND; positive bowel sounds x 4  Sternum: Intact, no click, incision healing well with no drainage  Incisions: Incisions clean/dry/intact  Extremities: Mild edema b/l lower extremities; good capillary refill; no cyanosis; palpable 1+ pedal pulses b/l      EPICARDIAL WIRES:  YES                                                                         Day # 6  BOWEL MOVEMENT:  [X] YES [] NO, If No, Timing since last BM Day #        LABS:                        6.4[LL]  8.12  )-----------( 249      ( 11 Mar 2025 04:25 )             19.3[L]                        6.6[LL]  8.43  )-----------( 230      ( 11 Mar 2025 02:36 )             19.6[L]    03-11    136  |  99  |  13  ----------------------------<  184[H]  4.6   |  26  |  0.8  03-10    140  |  101  |  13  ----------------------------<  119[H]  3.8   |  30  |  0.6[L]    Ca    8.1[L]      11 Mar 2025 02:36  Mg     1.9     03-11    TPro  5.8[L] [6.0 - 8.0]  /  Alb  3.4[L] [3.5 - 5.2]  /  TBili  0.6 [0.2 - 1.2]  /  DBili  x   /  AST  25 [0 - 41]  /  ALT  36 [0 - 41]  /  AlkPhos  136[H] [30 - 115]  03-11      Urinalysis Basic - ( 11 Mar 2025 02:36 )    Color: x / Appearance: x / SG: x / pH: x  Gluc: 184 mg/dL / Ketone: x  / Bili: x / Urobili: x   Blood: x / Protein: x / Nitrite: x   Leuk Esterase: x / RBC: x / WBC x   Sq Epi: x / Non Sq Epi: x / Bacteria: x        RADIOLOGY & ADDITIONAL TESTS:  CXR: < from: Xray Chest 1 View-PORTABLE IMMEDIATE (Xray Chest 1 View-PORTABLE IMMEDIATE .) (03.10.25 @ 17:28) >    Impression:    No significant change in bilateral pleural effusions, opacities, post   left chest tube placement.    < end of copied text >    EKG: < from: 12 Lead ECG (03.10.25 @ 07:10) >    Ventricular Rate 90 BPM    Atrial Rate 90 BPM    P-R Interval 144 ms    QRS Duration 92 ms    Q-T Interval 374 ms    QTC Calculation(Bazett) 457 ms    P Axis 22 degrees    R Axis -19 degrees    T Axis 22 degrees    Diagnosis Line Normal sinus rhythm  Nonspecific T wave abnormality  Abnormal ECG    < end of copied text >    MEDICATIONS  (STANDING):  albumin human 25% IVPB 50 milliLiter(s) IV Intermittent every 6 hours  ascorbic acid 500 milliGRAM(s) Oral daily  aspirin enteric coated 81 milliGRAM(s) Oral daily  atorvastatin 80 milliGRAM(s) Oral at bedtime  bisacodyl Suppository 10 milliGRAM(s) Rectal once  cefepime   IVPB      cefepime   IVPB 1000 milliGRAM(s) IV Intermittent every 12 hours  chlorhexidine 2% Cloths 1 Application(s) Topical daily  dextrose 5%. 1000 milliLiter(s) (50 mL/Hr) IV Continuous <Continuous>  dextrose 5%. 1000 milliLiter(s) (100 mL/Hr) IV Continuous <Continuous>  dextrose 50% Injectable 50 milliLiter(s) IV Push every 15 minutes  dextrose 50% Injectable 25 milliLiter(s) IV Push every 15 minutes  diltiazem    Tablet 60 milliGRAM(s) Oral every 8 hours  folic acid 1 milliGRAM(s) Oral daily  furosemide   Injectable 20 milliGRAM(s) IV Push every 12 hours  gabapentin 300 milliGRAM(s) Oral three times a day  glucagon  Injectable 1 milliGRAM(s) IntraMuscular once  heparin   Injectable 5000 Unit(s) SubCutaneous every 8 hours  influenza   Vaccine 0.5 milliLiter(s) IntraMuscular once  insulin lispro (ADMELOG) corrective regimen sliding scale   SubCutaneous three times a day before meals  insulin lispro (ADMELOG) corrective regimen sliding scale   SubCutaneous at bedtime  ipratropium    for Nebulization 500 MICROGram(s) Nebulizer every 6 hours  iron sucrose IVPB 200 milliGRAM(s) IV Intermittent every 24 hours  melatonin 5 milliGRAM(s) Oral at bedtime  metoprolol tartrate 50 milliGRAM(s) Oral every 8 hours  pantoprazole    Tablet 40 milliGRAM(s) Oral before breakfast  polyethylene glycol 3350 17 Gram(s) Oral daily  potassium chloride    Tablet ER 40 milliEquivalent(s) Oral daily  senna 2 Tablet(s) Oral at bedtime  sodium chloride 0.9%. 1000 milliLiter(s) (20 mL/Hr) IV Continuous <Continuous>  tamsulosin 0.4 milliGRAM(s) Oral at bedtime    MEDICATIONS  (PRN):  dextrose Oral Gel 15 Gram(s) Oral once PRN Blood Glucose LESS THAN 70 milliGRAM(s)/deciliter  ibuprofen  Tablet. 400 milliGRAM(s) Oral every 6 hours PRN Temp greater or equal to 38C (100.4F)  melatonin 5 milliGRAM(s) Oral at bedtime PRN Insomnia  ondansetron Injectable 4 milliGRAM(s) IV Push every 4 hours PRN Nausea and/or Vomiting  oxyCODONE    IR 5 milliGRAM(s) Oral every 4 hours PRN Moderate Pain (4 - 6)  oxyCODONE    IR 10 milliGRAM(s) Oral every 4 hours PRN Severe Pain (7 - 10)    HEPARIN:  [x] YES [] NO  Dose: 5000 UNITS Q8H  SCD's: YES b/l  GI Prophylaxis: Protonix [x], Pepcid [], None [], (Contra-indication:.....)    Post-Op Beta-Blockers: Yes [x], No[], If No, then contraindication:  Post-Op Aspirin: Yes [x],  No [] If No, then contraindication:  Post-Op Statin: Yes [x], No[], If No, then contraindication:  Allergies    No Known Allergies    Intolerances      Ambulation/Activity Status: ambulate     Assessment/Plan:  54y Male status-post CABG x4               POD #6   - Case and plan discussed with CTU Intensivist and CT Surgeon - Dr. Babcock  - Continue CTU supportive care and ongoing plan of care as per continuing CTU rounds.   - Continue DVT/GI prophylaxis  - Incentive Spirometry 10 times an hour  - Continue to advance physical activity as tolerated and continue PT/OT as directed  1. CAD s/p CABG: Continue ASA, statin, BB. Continue diuresis with Lasix 20mg qd.   2. HTN: Continue metoprolol 25mg bid. cardizem increased 60mg q8h   3. Post-op A. Fib ppx: monitor electrolytes, leave wires.   4. COPD/Hypoxia/Atelectasis on CXR: supplemental o2 needed, continue nebs, encourage cough and deep breathing  5. DM/Glucose Control (A1c 6.6): insulin sliding scale  6. Anemia 2/2 acute blood loss, pt asx: monitor H/H, active T&S, continue folic acid and IV iron  7. Febrile overnight: Tmax 38.1, blood cx and UA sent. CT chest non con ordered.    -d/c wires     Social Service Disposition: anticipate d/c home with home care in 24-48 hours OPERATIVE PROCEDURE(s):    CABGx4 + left radial harvest            POD #     6                  54yMale  SURGEON(s): YARED Babcock  SUBJECTIVE ASSESSMENT: pt seen and examined. no acute complaints at this time.   Vital Signs Last 24 Hrs  T(F): 99.2 (11 Mar 2025 08:00), Max: 99.4 (10 Mar 2025 12:00)  HR: 93 (11 Mar 2025 09:00) (85 - 123)  BP: 116/64 (11 Mar 2025 09:00) (101/58 - 149/71)  BP(mean): 83 (11 Mar 2025 09:00) (75 - 110)  RR: 21 (11 Mar 2025 09:00) (9 - 38)  SpO2: 100% (11 Mar 2025 09:00) (99% - 100%)      I&O's Detail    10 Mar 2025 07:01  -  11 Mar 2025 07:00  --------------------------------------------------------  IN:    IV PiggyBack: 425 mL    Oral Fluid: 850 mL  Total IN: 1275 mL    OUT:    Chest Tube (mL): 190 mL    Voided (mL): 1550 mL  Total OUT: 1740 mL        Net:   I&O's Detail    09 Mar 2025 07:01  -  10 Mar 2025 07:00  --------------------------------------------------------  Total NET: -3800 mL      10 Mar 2025 07:01  -  11 Mar 2025 07:00  --------------------------------------------------------  Total NET: -465 mL        CAPILLARY BLOOD GLUCOSE      POCT Blood Glucose.: 249 mg/dL (11 Mar 2025 08:24)  POCT Blood Glucose.: 166 mg/dL (11 Mar 2025 06:33)  POCT Blood Glucose.: 190 mg/dL (10 Mar 2025 22:38)  POCT Blood Glucose.: 255 mg/dL (10 Mar 2025 16:34)  POCT Blood Glucose.: 216 mg/dL (10 Mar 2025 11:26)    Physical Exam:  General: NAD; A&Ox3  Neuro: pupils equal and reactive, speech clear, no overt sensory or motor deficits  Cardiac: S1/S2, RRR, no murmur, no rubs  Lungs: unlabored shallow respirations, bilateral bases decreased  Abdomen: Soft/NT/ND; positive bowel sounds x 4  Sternum: Intact, no click, incision healing well with no drainage  Incisions: Incisions clean/dry/intact  Extremities: Mild edema b/l lower extremities; good capillary refill; no cyanosis; palpable 1+ pedal pulses b/l      EPICARDIAL WIRES:  YES                                                                         Day # 6  BOWEL MOVEMENT:  [X] YES [] NO, If No, Timing since last BM Day #        LABS:                        6.4[LL]  8.12  )-----------( 249      ( 11 Mar 2025 04:25 )             19.3[L]                        6.6[LL]  8.43  )-----------( 230      ( 11 Mar 2025 02:36 )             19.6[L]    03-11    136  |  99  |  13  ----------------------------<  184[H]  4.6   |  26  |  0.8  03-10    140  |  101  |  13  ----------------------------<  119[H]  3.8   |  30  |  0.6[L]    Ca    8.1[L]      11 Mar 2025 02:36  Mg     1.9     03-11    TPro  5.8[L] [6.0 - 8.0]  /  Alb  3.4[L] [3.5 - 5.2]  /  TBili  0.6 [0.2 - 1.2]  /  DBili  x   /  AST  25 [0 - 41]  /  ALT  36 [0 - 41]  /  AlkPhos  136[H] [30 - 115]  03-11      Urinalysis Basic - ( 11 Mar 2025 02:36 )    Color: x / Appearance: x / SG: x / pH: x  Gluc: 184 mg/dL / Ketone: x  / Bili: x / Urobili: x   Blood: x / Protein: x / Nitrite: x   Leuk Esterase: x / RBC: x / WBC x   Sq Epi: x / Non Sq Epi: x / Bacteria: x        RADIOLOGY & ADDITIONAL TESTS:  CXR: < from: Xray Chest 1 View-PORTABLE IMMEDIATE (Xray Chest 1 View-PORTABLE IMMEDIATE .) (03.10.25 @ 17:28) >    Impression:    No significant change in bilateral pleural effusions, opacities, post   left chest tube placement.    < end of copied text >    < from: Xray Chest 1 View- PORTABLE-Routine (Xray Chest 1 View- PORTABLE-Routine in AM.) (03.11.25 @ 05:31) >    FINDINGS/  IMPRESSION:    Support devices: Left basilar pigtail catheter is stable.    Cardiac/mediastinum/hilum: Stable.    Lung parenchyma/Pleura: Interval decrease of right-sided pleural   effusion. Stable left-sided opacity/effusion. No pneumothorax.    Skeleton/soft tissues: Stable.    --- End of Report ---      < end of copied text >      EKG: < from: 12 Lead ECG (03.10.25 @ 07:10) >    Ventricular Rate 90 BPM    Atrial Rate 90 BPM    P-R Interval 144 ms    QRS Duration 92 ms    Q-T Interval 374 ms    QTC Calculation(Bazett) 457 ms    P Axis 22 degrees    R Axis -19 degrees    T Axis 22 degrees    Diagnosis Line Normal sinus rhythm  Nonspecific T wave abnormality  Abnormal ECG    < end of copied text >    MEDICATIONS  (STANDING):  albumin human 25% IVPB 50 milliLiter(s) IV Intermittent every 6 hours  ascorbic acid 500 milliGRAM(s) Oral daily  aspirin enteric coated 81 milliGRAM(s) Oral daily  atorvastatin 80 milliGRAM(s) Oral at bedtime  bisacodyl Suppository 10 milliGRAM(s) Rectal once  cefepime   IVPB      cefepime   IVPB 1000 milliGRAM(s) IV Intermittent every 12 hours  chlorhexidine 2% Cloths 1 Application(s) Topical daily  dextrose 5%. 1000 milliLiter(s) (50 mL/Hr) IV Continuous <Continuous>  dextrose 5%. 1000 milliLiter(s) (100 mL/Hr) IV Continuous <Continuous>  dextrose 50% Injectable 50 milliLiter(s) IV Push every 15 minutes  dextrose 50% Injectable 25 milliLiter(s) IV Push every 15 minutes  diltiazem    Tablet 60 milliGRAM(s) Oral every 8 hours  folic acid 1 milliGRAM(s) Oral daily  furosemide   Injectable 20 milliGRAM(s) IV Push every 12 hours  gabapentin 300 milliGRAM(s) Oral three times a day  glucagon  Injectable 1 milliGRAM(s) IntraMuscular once  heparin   Injectable 5000 Unit(s) SubCutaneous every 8 hours  influenza   Vaccine 0.5 milliLiter(s) IntraMuscular once  insulin lispro (ADMELOG) corrective regimen sliding scale   SubCutaneous three times a day before meals  insulin lispro (ADMELOG) corrective regimen sliding scale   SubCutaneous at bedtime  ipratropium    for Nebulization 500 MICROGram(s) Nebulizer every 6 hours  iron sucrose IVPB 200 milliGRAM(s) IV Intermittent every 24 hours  melatonin 5 milliGRAM(s) Oral at bedtime  metoprolol tartrate 50 milliGRAM(s) Oral every 8 hours  pantoprazole    Tablet 40 milliGRAM(s) Oral before breakfast  polyethylene glycol 3350 17 Gram(s) Oral daily  potassium chloride    Tablet ER 40 milliEquivalent(s) Oral daily  senna 2 Tablet(s) Oral at bedtime  sodium chloride 0.9%. 1000 milliLiter(s) (20 mL/Hr) IV Continuous <Continuous>  tamsulosin 0.4 milliGRAM(s) Oral at bedtime    MEDICATIONS  (PRN):  dextrose Oral Gel 15 Gram(s) Oral once PRN Blood Glucose LESS THAN 70 milliGRAM(s)/deciliter  ibuprofen  Tablet. 400 milliGRAM(s) Oral every 6 hours PRN Temp greater or equal to 38C (100.4F)  melatonin 5 milliGRAM(s) Oral at bedtime PRN Insomnia  ondansetron Injectable 4 milliGRAM(s) IV Push every 4 hours PRN Nausea and/or Vomiting  oxyCODONE    IR 5 milliGRAM(s) Oral every 4 hours PRN Moderate Pain (4 - 6)  oxyCODONE    IR 10 milliGRAM(s) Oral every 4 hours PRN Severe Pain (7 - 10)    HEPARIN:  [x] YES [] NO  Dose: 5000 UNITS Q8H  SCD's: YES b/l  GI Prophylaxis: Protonix [x], Pepcid [], None [], (Contra-indication:.....)    Post-Op Beta-Blockers: Yes [x], No[], If No, then contraindication:  Post-Op Aspirin: Yes [x],  No [] If No, then contraindication:  Post-Op Statin: Yes [x], No[], If No, then contraindication:  Allergies    No Known Allergies    Intolerances      Ambulation/Activity Status: ambulate     Assessment/Plan:  54y Male status-post CABG x4               POD #6   - Case and plan discussed with CTU Intensivist and CT Surgeon - Dr. Babcock  - Continue CTU supportive care and ongoing plan of care as per continuing CTU rounds.   - Continue DVT/GI prophylaxis  - Incentive Spirometry 10 times an hour  - Continue to advance physical activity as tolerated and continue PT/OT as directed  1. CAD s/p CABG: Continue ASA, statin, BB. Continue diuresis with Lasix 20mg qd.   2. HTN: Continue metoprolol 25mg bid. cardizem increased 60mg q8h   3. Post-op A. Fib ppx: monitor electrolytes  4. COPD/Hypoxia/Atelectasis on CXR: supplemental o2 needed, continue nebs, encourage cough and deep breathing  5. DM/Glucose Control (A1c 6.6): insulin sliding scale  6. Anemia 2/2 acute blood loss, pt asx: monitor H/H, active T&S, continue folic acid and IV iron  7. Febrile overnight: Tmax 38.1, blood cx and UA sent. CT chest non con ordered.    - L pleural effusion s/p L pigtail placement, improving: Continue to monitor CXR, thoracic surgery following, poss d/c pigtail today  -d/c wires today    Social Service Disposition: anticipate d/c home with home care in 24-48 hours

## 2025-03-12 LAB
ALBUMIN SERPL ELPH-MCNC: 3.4 G/DL — LOW (ref 3.5–5.2)
ALP SERPL-CCNC: 125 U/L — HIGH (ref 30–115)
ALT FLD-CCNC: 26 U/L — SIGNIFICANT CHANGE UP (ref 0–41)
ANION GAP SERPL CALC-SCNC: 8 MMOL/L — SIGNIFICANT CHANGE UP (ref 7–14)
AST SERPL-CCNC: 14 U/L — SIGNIFICANT CHANGE UP (ref 0–41)
BASOPHILS # BLD AUTO: 0.02 K/UL — SIGNIFICANT CHANGE UP (ref 0–0.2)
BASOPHILS NFR BLD AUTO: 0.2 % — SIGNIFICANT CHANGE UP (ref 0–1)
BILIRUB SERPL-MCNC: 1 MG/DL — SIGNIFICANT CHANGE UP (ref 0.2–1.2)
BUN SERPL-MCNC: 12 MG/DL — SIGNIFICANT CHANGE UP (ref 10–20)
CALCIUM SERPL-MCNC: 8 MG/DL — LOW (ref 8.4–10.5)
CHLORIDE SERPL-SCNC: 100 MMOL/L — SIGNIFICANT CHANGE UP (ref 98–110)
CO2 SERPL-SCNC: 28 MMOL/L — SIGNIFICANT CHANGE UP (ref 17–32)
CREAT SERPL-MCNC: 0.7 MG/DL — SIGNIFICANT CHANGE UP (ref 0.7–1.5)
EGFR: 110 ML/MIN/1.73M2 — SIGNIFICANT CHANGE UP
EGFR: 110 ML/MIN/1.73M2 — SIGNIFICANT CHANGE UP
EOSINOPHIL # BLD AUTO: 0.39 K/UL — SIGNIFICANT CHANGE UP (ref 0–0.7)
EOSINOPHIL NFR BLD AUTO: 4.4 % — SIGNIFICANT CHANGE UP (ref 0–8)
GLUCOSE BLDC GLUCOMTR-MCNC: 157 MG/DL — HIGH (ref 70–99)
GLUCOSE BLDC GLUCOMTR-MCNC: 235 MG/DL — HIGH (ref 70–99)
GLUCOSE BLDC GLUCOMTR-MCNC: 248 MG/DL — HIGH (ref 70–99)
GLUCOSE BLDC GLUCOMTR-MCNC: 278 MG/DL — HIGH (ref 70–99)
GLUCOSE SERPL-MCNC: 138 MG/DL — HIGH (ref 70–99)
HCT VFR BLD CALC: 23.2 % — LOW (ref 42–52)
HGB BLD-MCNC: 7.7 G/DL — LOW (ref 14–18)
IMM GRANULOCYTES NFR BLD AUTO: 2 % — HIGH (ref 0.1–0.3)
LYMPHOCYTES # BLD AUTO: 1.44 K/UL — SIGNIFICANT CHANGE UP (ref 1.2–3.4)
LYMPHOCYTES # BLD AUTO: 16.1 % — LOW (ref 20.5–51.1)
MAGNESIUM SERPL-MCNC: 2.1 MG/DL — SIGNIFICANT CHANGE UP (ref 1.8–2.4)
MCHC RBC-ENTMCNC: 27.3 PG — SIGNIFICANT CHANGE UP (ref 27–31)
MCHC RBC-ENTMCNC: 33.2 G/DL — SIGNIFICANT CHANGE UP (ref 32–37)
MCV RBC AUTO: 82.3 FL — SIGNIFICANT CHANGE UP (ref 80–94)
MONOCYTES # BLD AUTO: 0.83 K/UL — HIGH (ref 0.1–0.6)
MONOCYTES NFR BLD AUTO: 9.3 % — SIGNIFICANT CHANGE UP (ref 1.7–9.3)
NEUTROPHILS # BLD AUTO: 6.07 K/UL — SIGNIFICANT CHANGE UP (ref 1.4–6.5)
NEUTROPHILS NFR BLD AUTO: 68 % — SIGNIFICANT CHANGE UP (ref 42.2–75.2)
NRBC BLD AUTO-RTO: 0 /100 WBCS — SIGNIFICANT CHANGE UP (ref 0–0)
PLATELET # BLD AUTO: 235 K/UL — SIGNIFICANT CHANGE UP (ref 130–400)
PMV BLD: 8.5 FL — SIGNIFICANT CHANGE UP (ref 7.4–10.4)
POTASSIUM SERPL-MCNC: 4.7 MMOL/L — SIGNIFICANT CHANGE UP (ref 3.5–5)
POTASSIUM SERPL-SCNC: 4.7 MMOL/L — SIGNIFICANT CHANGE UP (ref 3.5–5)
PROT SERPL-MCNC: 5.6 G/DL — LOW (ref 6–8)
RBC # BLD: 2.82 M/UL — LOW (ref 4.7–6.1)
RBC # FLD: 16.8 % — HIGH (ref 11.5–14.5)
SODIUM SERPL-SCNC: 136 MMOL/L — SIGNIFICANT CHANGE UP (ref 135–146)
WBC # BLD: 8.93 K/UL — SIGNIFICANT CHANGE UP (ref 4.8–10.8)
WBC # FLD AUTO: 8.93 K/UL — SIGNIFICANT CHANGE UP (ref 4.8–10.8)

## 2025-03-12 PROCEDURE — 71045 X-RAY EXAM CHEST 1 VIEW: CPT | Mod: 26

## 2025-03-12 PROCEDURE — 93010 ELECTROCARDIOGRAM REPORT: CPT

## 2025-03-12 PROCEDURE — 99291 CRITICAL CARE FIRST HOUR: CPT

## 2025-03-12 RX ORDER — FUROSEMIDE 10 MG/ML
20 INJECTION INTRAMUSCULAR; INTRAVENOUS ONCE
Refills: 0 | Status: COMPLETED | OUTPATIENT
Start: 2025-03-12 | End: 2025-03-12

## 2025-03-12 RX ORDER — FUROSEMIDE 10 MG/ML
40 INJECTION INTRAMUSCULAR; INTRAVENOUS EVERY 12 HOURS
Refills: 0 | Status: DISCONTINUED | OUTPATIENT
Start: 2025-03-12 | End: 2025-03-14

## 2025-03-12 RX ORDER — VANCOMYCIN HCL IN 5 % DEXTROSE 1.5G/250ML
1000 PLASTIC BAG, INJECTION (ML) INTRAVENOUS EVERY 12 HOURS
Refills: 0 | Status: DISCONTINUED | OUTPATIENT
Start: 2025-03-12 | End: 2025-03-13

## 2025-03-12 RX ORDER — ACETYLCYSTEINE 200 MG/ML
4 INHALANT RESPIRATORY (INHALATION) EVERY 6 HOURS
Refills: 0 | Status: DISCONTINUED | OUTPATIENT
Start: 2025-03-12 | End: 2025-03-14

## 2025-03-12 RX ADMIN — Medication 500 MICROGRAM(S): at 13:41

## 2025-03-12 RX ADMIN — HEPARIN SODIUM 5000 UNIT(S): 1000 INJECTION INTRAVENOUS; SUBCUTANEOUS at 06:55

## 2025-03-12 RX ADMIN — CEFEPIME 100 MILLIGRAM(S): 2 INJECTION, POWDER, FOR SOLUTION INTRAVENOUS at 18:09

## 2025-03-12 RX ADMIN — FUROSEMIDE 40 MILLIGRAM(S): 10 INJECTION INTRAMUSCULAR; INTRAVENOUS at 18:09

## 2025-03-12 RX ADMIN — Medication 1 APPLICATION(S): at 06:57

## 2025-03-12 RX ADMIN — Medication 40 MILLIGRAM(S): at 06:55

## 2025-03-12 RX ADMIN — FUROSEMIDE 20 MILLIGRAM(S): 10 INJECTION INTRAMUSCULAR; INTRAVENOUS at 09:55

## 2025-03-12 RX ADMIN — INSULIN LISPRO 3: 100 INJECTION, SOLUTION INTRAVENOUS; SUBCUTANEOUS at 16:06

## 2025-03-12 RX ADMIN — METOPROLOL SUCCINATE 50 MILLIGRAM(S): 50 TABLET, EXTENDED RELEASE ORAL at 06:55

## 2025-03-12 RX ADMIN — Medication 500 MILLIGRAM(S): at 12:13

## 2025-03-12 RX ADMIN — DILTIAZEM HYDROCHLORIDE 60 MILLIGRAM(S): 240 TABLET, EXTENDED RELEASE ORAL at 21:27

## 2025-03-12 RX ADMIN — GABAPENTIN 300 MILLIGRAM(S): 400 CAPSULE ORAL at 21:27

## 2025-03-12 RX ADMIN — DILTIAZEM HYDROCHLORIDE 60 MILLIGRAM(S): 240 TABLET, EXTENDED RELEASE ORAL at 06:56

## 2025-03-12 RX ADMIN — INSULIN LISPRO 0: 100 INJECTION, SOLUTION INTRAVENOUS; SUBCUTANEOUS at 21:37

## 2025-03-12 RX ADMIN — Medication 5 MILLIGRAM(S): at 21:27

## 2025-03-12 RX ADMIN — ATORVASTATIN CALCIUM 80 MILLIGRAM(S): 80 TABLET, FILM COATED ORAL at 21:28

## 2025-03-12 RX ADMIN — METOPROLOL SUCCINATE 50 MILLIGRAM(S): 50 TABLET, EXTENDED RELEASE ORAL at 13:16

## 2025-03-12 RX ADMIN — GABAPENTIN 300 MILLIGRAM(S): 400 CAPSULE ORAL at 13:16

## 2025-03-12 RX ADMIN — Medication 500 MICROGRAM(S): at 20:47

## 2025-03-12 RX ADMIN — Medication 250 MILLIGRAM(S): at 18:08

## 2025-03-12 RX ADMIN — ACETYLCYSTEINE 4 MILLILITER(S): 200 INHALANT RESPIRATORY (INHALATION) at 20:47

## 2025-03-12 RX ADMIN — Medication 40 MILLIEQUIVALENT(S): at 12:12

## 2025-03-12 RX ADMIN — TAMSULOSIN HYDROCHLORIDE 0.4 MILLIGRAM(S): 0.4 CAPSULE ORAL at 21:27

## 2025-03-12 RX ADMIN — OXYCODONE HYDROCHLORIDE 5 MILLIGRAM(S): 30 TABLET ORAL at 13:00

## 2025-03-12 RX ADMIN — IRON SUCROSE 100 MILLIGRAM(S): 20 INJECTION, SOLUTION INTRAVENOUS at 12:11

## 2025-03-12 RX ADMIN — DILTIAZEM HYDROCHLORIDE 60 MILLIGRAM(S): 240 TABLET, EXTENDED RELEASE ORAL at 13:16

## 2025-03-12 RX ADMIN — Medication 40 MILLIEQUIVALENT(S): at 18:09

## 2025-03-12 RX ADMIN — INSULIN LISPRO 2: 100 INJECTION, SOLUTION INTRAVENOUS; SUBCUTANEOUS at 12:13

## 2025-03-12 RX ADMIN — CEFEPIME 100 MILLIGRAM(S): 2 INJECTION, POWDER, FOR SOLUTION INTRAVENOUS at 06:56

## 2025-03-12 RX ADMIN — FOLIC ACID 1 MILLIGRAM(S): 1 TABLET ORAL at 12:12

## 2025-03-12 RX ADMIN — Medication 2 TABLET(S): at 21:27

## 2025-03-12 RX ADMIN — Medication 500 MICROGRAM(S): at 08:30

## 2025-03-12 RX ADMIN — OXYCODONE HYDROCHLORIDE 5 MILLIGRAM(S): 30 TABLET ORAL at 12:12

## 2025-03-12 RX ADMIN — GABAPENTIN 300 MILLIGRAM(S): 400 CAPSULE ORAL at 06:56

## 2025-03-12 RX ADMIN — METOPROLOL SUCCINATE 50 MILLIGRAM(S): 50 TABLET, EXTENDED RELEASE ORAL at 21:28

## 2025-03-12 RX ADMIN — Medication 500 MICROGRAM(S): at 06:25

## 2025-03-12 RX ADMIN — HEPARIN SODIUM 5000 UNIT(S): 1000 INJECTION INTRAVENOUS; SUBCUTANEOUS at 21:27

## 2025-03-12 RX ADMIN — ACETYLCYSTEINE 4 MILLILITER(S): 200 INHALANT RESPIRATORY (INHALATION) at 08:31

## 2025-03-12 RX ADMIN — FUROSEMIDE 20 MILLIGRAM(S): 10 INJECTION INTRAMUSCULAR; INTRAVENOUS at 06:55

## 2025-03-12 RX ADMIN — HEPARIN SODIUM 5000 UNIT(S): 1000 INJECTION INTRAVENOUS; SUBCUTANEOUS at 13:16

## 2025-03-12 RX ADMIN — ACETYLCYSTEINE 4 MILLILITER(S): 200 INHALANT RESPIRATORY (INHALATION) at 13:42

## 2025-03-12 RX ADMIN — OXYCODONE HYDROCHLORIDE 5 MILLIGRAM(S): 30 TABLET ORAL at 00:18

## 2025-03-12 RX ADMIN — Medication 81 MILLIGRAM(S): at 12:12

## 2025-03-12 NOTE — PROGRESS NOTE ADULT - SUBJECTIVE AND OBJECTIVE BOX
OPERATIVE PROCEDURE(s):                POD #                       54yMale  SURGEON(s): YARED Babcock  SUBJECTIVE ASSESSMENT:   Vital Signs Last 24 Hrs  T(F): 98.4 (12 Mar 2025 08:00), Max: 100.4 (11 Mar 2025 18:00)  HR: 95 (12 Mar 2025 09:00) (89 - 111)  BP: 119/63 (12 Mar 2025 09:00) (97/66 - 133/63)  BP(mean): 85 (12 Mar 2025 09:00) (76 - 92)  RR: 26 (12 Mar 2025 09:00) (10 - 41)  SpO2: 98% (12 Mar 2025 09:00) (97% - 100%)    I&O's Detail    11 Mar 2025 07:01  -  12 Mar 2025 07:00  --------------------------------------------------------  IN:    IV PiggyBack: 150 mL    Oral Fluid: 370 mL    PRBCs (Packed Red Blood Cells): 1 mL  Total IN: 521 mL    OUT:    Chest Tube (mL): 0 mL    Voided (mL): 3000 mL  Total OUT: 3000 mL        Net:   I&O's Detail    10 Mar 2025 07:01  -  11 Mar 2025 07:00  --------------------------------------------------------  Total NET: -465 mL      11 Mar 2025 07:01  -  12 Mar 2025 07:00  --------------------------------------------------------  Total NET: -2479 mL        CAPILLARY BLOOD GLUCOSE      POCT Blood Glucose.: 157 mg/dL (12 Mar 2025 07:02)  POCT Blood Glucose.: 180 mg/dL (11 Mar 2025 21:19)  POCT Blood Glucose.: 208 mg/dL (11 Mar 2025 16:05)  POCT Blood Glucose.: 245 mg/dL (11 Mar 2025 11:31)    Physical Exam:  General: NAD; A&Ox3/Patient is intubated and sedated  Cardiac: S1/S2, RRR, no murmur, no rubs  Lungs: unlabored respirations, CTA b/l, no wheeze, no rales, no crackles  Abdomen: Soft/NT/ND; positive bowel sounds x 4  Sternum: Intact, no click, incision healing well with no drainage  Incisions: Incisions clean/dry/intact  Extremities: No edema b/l lower extremities; good capillary refill; no cyanosis; palpable 1+ pedal pulses b/l    Central Venous Catheter: Yes[]  No[] , If Yes indication:           Day #  Moore Catheter: Yes  [] , No  [] , If yes indication:                      Day #  NGT: Yes [] No [] ,    If Yes Placement:                                     Day #  EPICARDIAL WIRES:  [] YES [] NO                                              Day #  BOWEL MOVEMENT:  [] YES [] NO, If No, Timing since last BM:      Day #  CHEST TUBE(Left/Right):  [] YES [] NO, If yes -  AIR LEAKS:  [] YES [] NO        LABS:                        7.7[L]  8.93  )-----------( 235      ( 12 Mar 2025 04:00 )             23.2[L]                        6.4[LL]  8.12  )-----------( 249      ( 11 Mar 2025 04:25 )             19.3[L]    03-12    136  |  100  |  12  ----------------------------<  138[H]  4.7   |  28  |  0.7  03-11    136  |  99  |  13  ----------------------------<  184[H]  4.6   |  26  |  0.8    Ca    8.0[L]      12 Mar 2025 04:00  Mg     2.1     03-12    TPro  5.6[L] [6.0 - 8.0]  /  Alb  3.4[L] [3.5 - 5.2]  /  TBili  1.0 [0.2 - 1.2]  /  DBili  x   /  AST  14 [0 - 41]  /  ALT  26 [0 - 41]  /  AlkPhos  125[H] [30 - 115]  03-12      Urinalysis Basic - ( 12 Mar 2025 04:00 )    Color: x / Appearance: x / SG: x / pH: x  Gluc: 138 mg/dL / Ketone: x  / Bili: x / Urobili: x   Blood: x / Protein: x / Nitrite: x   Leuk Esterase: x / RBC: x / WBC x   Sq Epi: x / Non Sq Epi: x / Bacteria: x        RADIOLOGY & ADDITIONAL TESTS:  CXR:  EKG:  MEDICATIONS  (STANDING):  acetylcysteine 10%  Inhalation 4 milliLiter(s) Inhalation every 6 hours  ascorbic acid 500 milliGRAM(s) Oral daily  aspirin enteric coated 81 milliGRAM(s) Oral daily  atorvastatin 80 milliGRAM(s) Oral at bedtime  bisacodyl Suppository 10 milliGRAM(s) Rectal once  cefepime   IVPB      cefepime   IVPB 1000 milliGRAM(s) IV Intermittent every 12 hours  chlorhexidine 2% Cloths 1 Application(s) Topical daily  dextrose 5%. 1000 milliLiter(s) (50 mL/Hr) IV Continuous <Continuous>  dextrose 5%. 1000 milliLiter(s) (100 mL/Hr) IV Continuous <Continuous>  dextrose 50% Injectable 50 milliLiter(s) IV Push every 15 minutes  dextrose 50% Injectable 25 milliLiter(s) IV Push every 15 minutes  diltiazem    Tablet 60 milliGRAM(s) Oral every 8 hours  folic acid 1 milliGRAM(s) Oral daily  furosemide   Injectable 20 milliGRAM(s) IV Push every 12 hours  gabapentin 300 milliGRAM(s) Oral three times a day  glucagon  Injectable 1 milliGRAM(s) IntraMuscular once  heparin   Injectable 5000 Unit(s) SubCutaneous every 8 hours  influenza   Vaccine 0.5 milliLiter(s) IntraMuscular once  insulin lispro (ADMELOG) corrective regimen sliding scale   SubCutaneous three times a day before meals  insulin lispro (ADMELOG) corrective regimen sliding scale   SubCutaneous at bedtime  ipratropium    for Nebulization 500 MICROGram(s) Nebulizer every 6 hours  iron sucrose IVPB 200 milliGRAM(s) IV Intermittent every 24 hours  melatonin 5 milliGRAM(s) Oral at bedtime  metoprolol tartrate 50 milliGRAM(s) Oral every 8 hours  pantoprazole    Tablet 40 milliGRAM(s) Oral before breakfast  polyethylene glycol 3350 17 Gram(s) Oral daily  potassium chloride    Tablet ER 40 milliEquivalent(s) Oral daily  senna 2 Tablet(s) Oral at bedtime  tamsulosin 0.4 milliGRAM(s) Oral at bedtime    MEDICATIONS  (PRN):  dextrose Oral Gel 15 Gram(s) Oral once PRN Blood Glucose LESS THAN 70 milliGRAM(s)/deciliter  ibuprofen  Tablet. 400 milliGRAM(s) Oral every 6 hours PRN Temp greater or equal to 38C (100.4F)  melatonin 5 milliGRAM(s) Oral at bedtime PRN Insomnia  ondansetron Injectable 4 milliGRAM(s) IV Push every 4 hours PRN Nausea and/or Vomiting  oxyCODONE    IR 5 milliGRAM(s) Oral every 4 hours PRN Moderate Pain (4 - 6)  oxyCODONE    IR 10 milliGRAM(s) Oral every 4 hours PRN Severe Pain (7 - 10)    HEPARIN:  [] YES [] NO  Dose: XX UNITS/HR UNITS Q8H  LOVENOX:[] YES [] NO  Dose: XX mg Q24H  COUMADIN: []  YES [] NO  Dose: XX mg  Q24H  SCD's: YES b/l  GI Prophylaxis: Protonix [], Pepcid [], None [], (Contra-indication:.....)    Post-Op Beta-Blockers: Yes [], No[], If No, then contraindication:  Post-Op Aspirin: Yes [],  No [], If No, then contraindication:  Post-Op Statin: Yes [], No[], If No, then contraindication:  Allergies    No Known Allergies    Intolerances      Ambulation/Activity Status:    Assessment/Plan:  54y Male status-post .....  - Case and plan discussed with CTU Intensivist and CT Surgeon - Dr. Babcock/Nuno/Nina  - Continue CTU supportive care    - Continue DVT/GI prophylaxis  - Incentive Spirometry 10 times an hour  - Continue to advance physical activity as tolerated and continue PT/OT as directed  1. CAD: Continue ASA, statin, BB  2. HTN:   3. A. Fib:   4. COPD/Hypoxia:   5. DM/Glucose Control:     Social Service Disposition:     OPERATIVE PROCEDURE(s):    CABGx4 + left radial harvest            POD #     7                              54yMale  SURGEON(s): YARED Babcock  SUBJECTIVE ASSESSMENT: pt seen and examined. no acute complaints   Vital Signs Last 24 Hrs  T(F): 98.4 (12 Mar 2025 08:00), Max: 100.4 (11 Mar 2025 18:00)  HR: 95 (12 Mar 2025 09:00) (89 - 111)  BP: 119/63 (12 Mar 2025 09:00) (97/66 - 133/63)  BP(mean): 85 (12 Mar 2025 09:00) (76 - 92)  RR: 26 (12 Mar 2025 09:00) (10 - 41)  SpO2: 98% (12 Mar 2025 09:00) (97% - 100%)    I&O's Detail    11 Mar 2025 07:01  -  12 Mar 2025 07:00  --------------------------------------------------------  IN:    IV PiggyBack: 150 mL    Oral Fluid: 370 mL    PRBCs (Packed Red Blood Cells): 1 mL  Total IN: 521 mL    OUT:    Chest Tube (mL): 0 mL    Voided (mL): 3000 mL  Total OUT: 3000 mL        Net:   I&O's Detail    10 Mar 2025 07:01  -  11 Mar 2025 07:00  --------------------------------------------------------  Total NET: -465 mL      11 Mar 2025 07:01  -  12 Mar 2025 07:00  --------------------------------------------------------  Total NET: -2479 mL        CAPILLARY BLOOD GLUCOSE      POCT Blood Glucose.: 157 mg/dL (12 Mar 2025 07:02)  POCT Blood Glucose.: 180 mg/dL (11 Mar 2025 21:19)  POCT Blood Glucose.: 208 mg/dL (11 Mar 2025 16:05)  POCT Blood Glucose.: 245 mg/dL (11 Mar 2025 11:31)    Physical Exam:  General: NAD; A&Ox3  Neuro: pupils equal and reactive, speech clear, no overt sensory or motor deficits  Cardiac: S1/S2, RRR, no murmur, no rubs  Lungs: unlabored shallow respirations, bilateral bases decreased  Abdomen: Soft/NT/ND; positive bowel sounds x 4  Sternum: Intact, no click, incision healing well with no drainage  Incisions: Incisions clean/dry/intact  Extremities: Mild edema b/l lower extremities; good capillary refill; no cyanosis; palpable 1+ pedal pulses b/l      EPICARDIAL WIRES:  YES                                                                         Day # 7  BOWEL MOVEMENT:  [X] YES [] NO, If No, Timing since last BM Day #        LABS:                        7.7[L]  8.93  )-----------( 235      ( 12 Mar 2025 04:00 )             23.2[L]                        6.4[LL]  8.12  )-----------( 249      ( 11 Mar 2025 04:25 )             19.3[L]    03-12    136  |  100  |  12  ----------------------------<  138[H]  4.7   |  28  |  0.7  03-11    136  |  99  |  13  ----------------------------<  184[H]  4.6   |  26  |  0.8    Ca    8.0[L]      12 Mar 2025 04:00  Mg     2.1     03-12    TPro  5.6[L] [6.0 - 8.0]  /  Alb  3.4[L] [3.5 - 5.2]  /  TBili  1.0 [0.2 - 1.2]  /  DBili  x   /  AST  14 [0 - 41]  /  ALT  26 [0 - 41]  /  AlkPhos  125[H] [30 - 115]  03-12      Urinalysis Basic - ( 12 Mar 2025 04:00 )    Color: x / Appearance: x / SG: x / pH: x  Gluc: 138 mg/dL / Ketone: x  / Bili: x / Urobili: x   Blood: x / Protein: x / Nitrite: x   Leuk Esterase: x / RBC: x / WBC x   Sq Epi: x / Non Sq Epi: x / Bacteria: x        RADIOLOGY & ADDITIONAL TESTS:  CXR: < from: Xray Chest 1 View- PORTABLE-Routine (Xray Chest 1 View- PORTABLE-Routine in AM.) (03.12.25 @ 05:21) >  Impression:    Unchanged bilateral opacities, left pleural effusion. No pneumothorax.    < end of copied text >    EKG: < from: 12 Lead ECG (03.11.25 @ 07:06) >    Ventricular Rate 98 BPM    Atrial Rate 98 BPM    P-R Interval 144 ms    QRS Duration 88 ms    Q-T Interval 346 ms    QTC Calculation(Bazett) 441 ms    P Axis 45 degrees    R Axis -10 degrees    T Axis 49 degrees    Diagnosis Line Normal sinus rhythm  Nonspecific T wave abnormality  Abnormal ECG    < end of copied text >    MEDICATIONS  (STANDING):  acetylcysteine 10%  Inhalation 4 milliLiter(s) Inhalation every 6 hours  ascorbic acid 500 milliGRAM(s) Oral daily  aspirin enteric coated 81 milliGRAM(s) Oral daily  atorvastatin 80 milliGRAM(s) Oral at bedtime  bisacodyl Suppository 10 milliGRAM(s) Rectal once  cefepime   IVPB      cefepime   IVPB 1000 milliGRAM(s) IV Intermittent every 12 hours  chlorhexidine 2% Cloths 1 Application(s) Topical daily  dextrose 5%. 1000 milliLiter(s) (50 mL/Hr) IV Continuous <Continuous>  dextrose 5%. 1000 milliLiter(s) (100 mL/Hr) IV Continuous <Continuous>  dextrose 50% Injectable 50 milliLiter(s) IV Push every 15 minutes  dextrose 50% Injectable 25 milliLiter(s) IV Push every 15 minutes  diltiazem    Tablet 60 milliGRAM(s) Oral every 8 hours  folic acid 1 milliGRAM(s) Oral daily  furosemide   Injectable 20 milliGRAM(s) IV Push every 12 hours  gabapentin 300 milliGRAM(s) Oral three times a day  glucagon  Injectable 1 milliGRAM(s) IntraMuscular once  heparin   Injectable 5000 Unit(s) SubCutaneous every 8 hours  influenza   Vaccine 0.5 milliLiter(s) IntraMuscular once  insulin lispro (ADMELOG) corrective regimen sliding scale   SubCutaneous three times a day before meals  insulin lispro (ADMELOG) corrective regimen sliding scale   SubCutaneous at bedtime  ipratropium    for Nebulization 500 MICROGram(s) Nebulizer every 6 hours  iron sucrose IVPB 200 milliGRAM(s) IV Intermittent every 24 hours  melatonin 5 milliGRAM(s) Oral at bedtime  metoprolol tartrate 50 milliGRAM(s) Oral every 8 hours  pantoprazole    Tablet 40 milliGRAM(s) Oral before breakfast  polyethylene glycol 3350 17 Gram(s) Oral daily  potassium chloride    Tablet ER 40 milliEquivalent(s) Oral daily  senna 2 Tablet(s) Oral at bedtime  tamsulosin 0.4 milliGRAM(s) Oral at bedtime    MEDICATIONS  (PRN):  dextrose Oral Gel 15 Gram(s) Oral once PRN Blood Glucose LESS THAN 70 milliGRAM(s)/deciliter  ibuprofen  Tablet. 400 milliGRAM(s) Oral every 6 hours PRN Temp greater or equal to 38C (100.4F)  melatonin 5 milliGRAM(s) Oral at bedtime PRN Insomnia  ondansetron Injectable 4 milliGRAM(s) IV Push every 4 hours PRN Nausea and/or Vomiting  oxyCODONE    IR 5 milliGRAM(s) Oral every 4 hours PRN Moderate Pain (4 - 6)  oxyCODONE    IR 10 milliGRAM(s) Oral every 4 hours PRN Severe Pain (7 - 10)    HEPARIN:  [x] YES [] NO  Dose: 5000 UNITS Q8H  SCD's: YES b/l  GI Prophylaxis: Protonix [x], Pepcid [], None [], (Contra-indication:.....)    Post-Op Beta-Blockers: Yes [x], No[], If No, then contraindication:  Post-Op Aspirin: Yes [x],  No [], If No, then contraindication:  Post-Op Statin: Yes [x], No[], If No, then contraindication:  Allergies    No Known Allergies    Intolerances      Ambulation/Activity Status: ambulate     Assessment/Plan:  54y Male status-post CABG x4               POD #7    - Case and plan discussed with CTU Intensivist and CT Surgeon - Dr. Babcock  - Continue CTU supportive care and ongoing plan of care as per continuing CTU rounds.   - Continue DVT/GI prophylaxis  - Incentive Spirometry 10 times an hour  - Continue to advance physical activity as tolerated and continue PT/OT as directed  1. CAD s/p CABG: Continue ASA, statin, BB. Continue diuresis with Lasix 20mg qd.   2. HTN: Continue metoprolol 25mg bid. cardizem increased 60mg q8h   3. Post-op A. Fib ppx: monitor electrolytes  4. COPD/Hypoxia/Atelectasis on CXR: supplemental o2 needed, continue nebs, encourage cough and deep breathing  5. DM/Glucose Control (A1c 6.6): insulin sliding scale  6. Anemia 2/2 acute blood loss, pt asx: monitor H/H, active T&S, continue folic acid and IV iron  7. Febrile overnight: Tmax 38.1, blood cx and UA sent. CT chest non con ordered.    8, left pleural effusion s/p pig tail which is now removed, stable       Social Service Disposition: anticipate d/c home with home care in 24-48 hours

## 2025-03-12 NOTE — PROGRESS NOTE ADULT - ASSESSMENT
Assessment   s/p CABG POD 7      Plan:    Neuro:  Multimodal pain management  Tylenol, Lidoderm patch, gabapentin, opiates as needed  Monitor neuro status in the post op period    CV:  S/P CABG  ASA bet blockers started Statein  HTN / tachy  Lopressor 50Q8  Cardizem 60Q8  Monitor perfusion, , lactate, UOP, index and MVO2   Maintain MAP > 65  Continue diuresis    Resp:  L lung atelactasis - CT chest with loculated effusion, atelactasis  S/p chest tube by thoracic surgery - not draining - removed  Supplemental oxygen to maintain sats > 92%  Continuous pulse oximetry monitoring  IS / Chest PT  Nebulizers as needed  Flutter valve  Nebulizers  IPV    GI:  Advance diet - cardiac  Bowel Regimen - escalate as needed  PUD ppx per protocol    Renal  High risk for TERENCE post surgery  Monitor UOP, lytes, creatinine  lasix today - goal negative 1L   Add metolazone 2.5  Keep K > 4, Mg > 2    Endo:  Tight glucose control per CTICU protocol  SSI    Heme:  Acute blood loss anemia post surgery  IV iron / FA / MVI  Hgb low s/p 2 PRBC  DVT ppx    ID:  Cefepime for possible PNA  Add vanco  F/up cultures - NGTD  MRSA negative  UA negative  Monitor fever curve and WBC count      Upon my evaluation, the above patient has a high probability of imminent or life threatening deterioration due to a high risk for Shock, Cardiogenic shock, arrythmia, acute blood loss, acute kidney injury and acute pulmonary insufficiency which required my direct attention, intervention, and personal management.  Total critical care time indicated in the note includes review of radiology results, ordering, interpreting and reviewing diagnostic studies / lab tests with immediate assessment and treatment, consultant collaboration on findings and treatment options, monitoring for potential decompensation, obtaining history from family, EMT, nursing home staff and/or treating physicians; directing the titration of pressors, oxygen support devices, fluid resuscitation, interpretation of cardiac output measurements, interpretation of radiological assessments, interpretation of EKG / rhythm strips, telemetry.  This time did not overlap with the management of other patients or performing separately reportable procedures.      Management of this patient was discussed with the CT Surgery/Thoracic surgery/Structural Cardiology attending and mid-level providers.    I acknowledge the use of copied documentation.  All copy forward documentation is my own and has been reviewed and revised as appropriate.  If no changes were made, it is because that information remains the same.

## 2025-03-12 NOTE — PROGRESS NOTE ADULT - SUBJECTIVE AND OBJECTIVE BOX
CTU Attending Progress Daily Note       Procedure: CABG  POD#: 7    He has history of HLD (hyperlipidemia)    Hypertriglyceridemia    DM (diabetes mellitus)    CAD (coronary artery disease)    HTN (hypertension)      HPI:  Mr. BYRON NAJERA is a 54 year M with PMhx  includes DLD, HTN, CAD. Mr. NAJERA was worked up for complaints SOB on exertion. He  had a cardiac catheterization which revealed multivessel disease. On 03/05/25, he underwent surgical myocardial revascularization.    Hospital Course:  3/5 CABG 4 LIMA + Lt Radial  OR C2, Alb1, ,      on Levo and Vaso: Albumin 500 given  post-extubation hypercapnia on BIPAP  chest tube 300ml: Hb 7.7,              1 PRBCs 1 Platelets  3/6 Stable hemodynamics, off pressors, start BB  3/7 started diuresis  3/9 febrile, cultured, thoracentesis done 400cc removed  3/10 CT chest -> chest tube by thoracic surgery, lopressors / cardizem increased   3/11: 2 PRBC given  3/12: started vanco    OBJECTIVE:  ICU Vital Signs Last 24 Hrs  T(C): 36.9 (12 Mar 2025 12:00), Max: 38 (11 Mar 2025 18:00)  T(F): 98.5 (12 Mar 2025 12:00), Max: 100.4 (11 Mar 2025 18:00)  HR: 114 (12 Mar 2025 13:00) (89 - 114)  BP: 131/72 (12 Mar 2025 13:00) (97/66 - 133/63)  BP(mean): 95 (12 Mar 2025 13:00) (76 - 95)  ABP: --  ABP(mean): --  RR: 23 (12 Mar 2025 13:00) (10 - 31)  SpO2: 99% (12 Mar 2025 13:00) (97% - 100%)    O2 Parameters below as of 12 Mar 2025 14:00  Patient On (Oxygen Delivery Method): room air          I&O's Summary    11 Mar 2025 07:01  -  12 Mar 2025 07:00  --------------------------------------------------------  IN: 521 mL / OUT: 3000 mL / NET: -2479 mL    12 Mar 2025 07:01  -  12 Mar 2025 14:21  --------------------------------------------------------  IN: 820 mL / OUT: 2750 mL / NET: -1930 mL      I&O's Detail    11 Mar 2025 07:01  -  12 Mar 2025 07:00  --------------------------------------------------------  IN:    IV PiggyBack: 150 mL    Oral Fluid: 370 mL    PRBCs (Packed Red Blood Cells): 1 mL  Total IN: 521 mL    OUT:    Chest Tube (mL): 0 mL    Voided (mL): 3000 mL  Total OUT: 3000 mL    Total NET: -2479 mL      12 Mar 2025 07:01  -  12 Mar 2025 14:21  --------------------------------------------------------  IN:    IV PiggyBack: 100 mL    Oral Fluid: 720 mL  Total IN: 820 mL    OUT:    Voided (mL): 2750 mL  Total OUT: 2750 mL    Total NET: -1930 mL        Adult Advanced Hemodynamics Last 24 Hrs  CVP(mm Hg): --  CVP(cm H2O): --  CO: --  CI: --  PA: --  PA(mean): --  PCWP: --  SVR: --  SVRI: --  PVR: --  PVRI: --    CAPILLARY BLOOD GLUCOSE      POCT Blood Glucose.: 235 mg/dL (12 Mar 2025 11:58)  POCT Blood Glucose.: 157 mg/dL (12 Mar 2025 07:02)  POCT Blood Glucose.: 180 mg/dL (11 Mar 2025 21:19)  POCT Blood Glucose.: 208 mg/dL (11 Mar 2025 16:05)    LABS:                          7.7    8.93  )-----------( 235      ( 12 Mar 2025 04:00 )             23.2     03-12    136  |  100  |  12  ----------------------------<  138[H]  4.7   |  28  |  0.7    Ca    8.0[L]      12 Mar 2025 04:00  Mg     2.1     03-12    TPro  5.6[L]  /  Alb  3.4[L]  /  TBili  1.0  /  DBili  x   /  AST  14  /  ALT  26  /  AlkPhos  125[H]  03-12      Urinalysis Basic - ( 12 Mar 2025 04:00 )    Color: x / Appearance: x / SG: x / pH: x  Gluc: 138 mg/dL / Ketone: x  / Bili: x / Urobili: x   Blood: x / Protein: x / Nitrite: x   Leuk Esterase: x / RBC: x / WBC x   Sq Epi: x / Non Sq Epi: x / Bacteria: x        Culture - Blood (collected 09 Mar 2025 21:12)  Source: Blood Blood  Preliminary Report (12 Mar 2025 04:01):    No growth at 48 Hours    Culture - Blood (collected 09 Mar 2025 18:46)  Source: Blood Blood  Preliminary Report (12 Mar 2025 02:02):    No growth at 48 Hours      Home Medications:  aspirin 81 mg oral delayed release capsule: orally once a day (05 Mar 2025 06:53)  atorvastatin 80 mg oral tablet: 1 tab(s) orally once a day (05 Mar 2025 06:53)  icosapent 1 g oral capsule: 2 cap(s) orally once a day (05 Mar 2025 06:53)  losartan 25 mg oral tablet: 1 tab(s) orally once a day (05 Mar 2025 06:53)  metFORMIN 500 mg oral tablet: 1 tab(s) orally 2 times a day (05 Mar 2025 06:53)  metoprolol succinate 25 mg oral tablet, extended release: 1 tab(s) orally once a day (05 Mar 2025 06:53)  Ozempic 2 mg/1.5 mL (1 mg dose) subcutaneous solution: subcutaneous once a week (05 Mar 2025 06:53)    HOSPITAL MEDICATIONS:  MEDICATIONS  (STANDING):  acetylcysteine 10%  Inhalation 4 milliLiter(s) Inhalation every 6 hours  ascorbic acid 500 milliGRAM(s) Oral daily  aspirin enteric coated 81 milliGRAM(s) Oral daily  atorvastatin 80 milliGRAM(s) Oral at bedtime  bisacodyl Suppository 10 milliGRAM(s) Rectal once  cefepime   IVPB      cefepime   IVPB 1000 milliGRAM(s) IV Intermittent every 12 hours  chlorhexidine 2% Cloths 1 Application(s) Topical daily  dextrose 5%. 1000 milliLiter(s) (50 mL/Hr) IV Continuous <Continuous>  dextrose 5%. 1000 milliLiter(s) (100 mL/Hr) IV Continuous <Continuous>  dextrose 50% Injectable 50 milliLiter(s) IV Push every 15 minutes  dextrose 50% Injectable 25 milliLiter(s) IV Push every 15 minutes  diltiazem    Tablet 60 milliGRAM(s) Oral every 8 hours  folic acid 1 milliGRAM(s) Oral daily  furosemide   Injectable 40 milliGRAM(s) IV Push every 12 hours  gabapentin 300 milliGRAM(s) Oral three times a day  glucagon  Injectable 1 milliGRAM(s) IntraMuscular once  heparin   Injectable 5000 Unit(s) SubCutaneous every 8 hours  influenza   Vaccine 0.5 milliLiter(s) IntraMuscular once  insulin lispro (ADMELOG) corrective regimen sliding scale   SubCutaneous three times a day before meals  insulin lispro (ADMELOG) corrective regimen sliding scale   SubCutaneous at bedtime  ipratropium    for Nebulization 500 MICROGram(s) Nebulizer every 6 hours  iron sucrose IVPB 200 milliGRAM(s) IV Intermittent every 24 hours  melatonin 5 milliGRAM(s) Oral at bedtime  metoprolol tartrate 50 milliGRAM(s) Oral every 8 hours  pantoprazole    Tablet 40 milliGRAM(s) Oral before breakfast  polyethylene glycol 3350 17 Gram(s) Oral daily  potassium chloride    Tablet ER 40 milliEquivalent(s) Oral daily  senna 2 Tablet(s) Oral at bedtime  tamsulosin 0.4 milliGRAM(s) Oral at bedtime  vancomycin  IVPB 1000 milliGRAM(s) IV Intermittent every 12 hours    MEDICATIONS  (PRN):  dextrose Oral Gel 15 Gram(s) Oral once PRN Blood Glucose LESS THAN 70 milliGRAM(s)/deciliter  ibuprofen  Tablet. 400 milliGRAM(s) Oral every 6 hours PRN Temp greater or equal to 38C (100.4F)  melatonin 5 milliGRAM(s) Oral at bedtime PRN Insomnia  ondansetron Injectable 4 milliGRAM(s) IV Push every 4 hours PRN Nausea and/or Vomiting  oxyCODONE    IR 5 milliGRAM(s) Oral every 4 hours PRN Moderate Pain (4 - 6)  oxyCODONE    IR 10 milliGRAM(s) Oral every 4 hours PRN Severe Pain (7 - 10)    RADIOLOGY:  Chest X-ray Reviewed    REVIEW OF SYSTEMS:  CONSTITUTIONAL: [X] all negative; [ ] weakness, [ ] fevers, [ ] chills  EYES/ENT: [X] all negative; [ ] visual changes, [ ] vertigo, [ ] throat pain   NECK: [X] all negative; [ ] pain, [ ] stiffness  RESPIRATORY: [] all negative, [ ] cough, [ ] wheezing, [ ] hemoptysis, [ ] shortness of breath  CARDIOVASCULAR: [] all negative; [ ] chest pain, [ ] palpitations, [ ] orthopnea  GASTROINTESTINAL: [X] all negative; [ ]abdominal pain, [ ] nausea, [ ] vomiting, [ ] hematemesis, [ ] diarrhea, [ ] constipation, [ ] melena, [ ] hematochezia.  GENITOURINARY: [X] all negative; [ ] dysuria, [ ] frequency, [ ] hematuria  NEUROLOGICAL: [X] all negative; [ ] numbness, [ ] weakness  SKIN: [X] all negative; [ ] itching, [ ] burning, [ ] rashes, [ ] lesions   All other review of systems is negative unless indicated above.  [  ] Unable to assess ROS because     PHYSICAL EXAM:          CONSTITUTIONAL: Well-developed; well-nourished; in no acute distress.   	SKIN: warm, dry  	HEAD: Normocephalic; atraumatic.  	EYES: PERRL, EOM, no conj injection, sclera clear  	ENT: No nasal discharge; airway clear.  	NECK: Supple; non tender.   	CARD: S1, S2 normal; no murmurs, gallops, or rubs. Regular rate and rhythm.  no carotid bruits  	RESP: CTA B/L; good air movement No wheezes, rales or rhonchi.  	ABD: Soft, not tender, not distended,  no rebound or guarding, bowel sounds present  	EXT: Normal ROM.  No clubbing, cyanosis or edema.   warm and well perfused.  pulses palpable  	NEURO: Alert, awake, motor 5/5 R, 5/5 L

## 2025-03-13 LAB
ACANTHOCYTES BLD QL SMEAR: SLIGHT — SIGNIFICANT CHANGE UP
ALBUMIN SERPL ELPH-MCNC: 3.7 G/DL — SIGNIFICANT CHANGE UP (ref 3.5–5.2)
ALP SERPL-CCNC: 143 U/L — HIGH (ref 30–115)
ALT FLD-CCNC: 28 U/L — SIGNIFICANT CHANGE UP (ref 0–41)
ANION GAP SERPL CALC-SCNC: 13 MMOL/L — SIGNIFICANT CHANGE UP (ref 7–14)
AST SERPL-CCNC: 14 U/L — SIGNIFICANT CHANGE UP (ref 0–41)
BASOPHILS # BLD AUTO: 0 K/UL — SIGNIFICANT CHANGE UP (ref 0–0.2)
BASOPHILS NFR BLD AUTO: 0 % — SIGNIFICANT CHANGE UP (ref 0–1)
BILIRUB SERPL-MCNC: 1 MG/DL — SIGNIFICANT CHANGE UP (ref 0.2–1.2)
BUN SERPL-MCNC: 11 MG/DL — SIGNIFICANT CHANGE UP (ref 10–20)
BURR CELLS BLD QL SMEAR: PRESENT — SIGNIFICANT CHANGE UP
CALCIUM SERPL-MCNC: 8.6 MG/DL — SIGNIFICANT CHANGE UP (ref 8.4–10.5)
CHLORIDE SERPL-SCNC: 94 MMOL/L — LOW (ref 98–110)
CO2 SERPL-SCNC: 29 MMOL/L — SIGNIFICANT CHANGE UP (ref 17–32)
CREAT SERPL-MCNC: 0.7 MG/DL — SIGNIFICANT CHANGE UP (ref 0.7–1.5)
DACRYOCYTES BLD QL SMEAR: SLIGHT — SIGNIFICANT CHANGE UP
EGFR: 110 ML/MIN/1.73M2 — SIGNIFICANT CHANGE UP
EGFR: 110 ML/MIN/1.73M2 — SIGNIFICANT CHANGE UP
EOSINOPHIL # BLD AUTO: 0.37 K/UL — SIGNIFICANT CHANGE UP (ref 0–0.7)
EOSINOPHIL NFR BLD AUTO: 4.3 % — SIGNIFICANT CHANGE UP (ref 0–8)
GIANT PLATELETS BLD QL SMEAR: PRESENT — SIGNIFICANT CHANGE UP
GLUCOSE BLDC GLUCOMTR-MCNC: 194 MG/DL — HIGH (ref 70–99)
GLUCOSE BLDC GLUCOMTR-MCNC: 208 MG/DL — HIGH (ref 70–99)
GLUCOSE BLDC GLUCOMTR-MCNC: 260 MG/DL — HIGH (ref 70–99)
GLUCOSE BLDC GLUCOMTR-MCNC: 331 MG/DL — HIGH (ref 70–99)
GLUCOSE SERPL-MCNC: 178 MG/DL — HIGH (ref 70–99)
GRAM STN FLD: ABNORMAL
HCT VFR BLD CALC: 27.4 % — LOW (ref 42–52)
HGB BLD-MCNC: 9.3 G/DL — LOW (ref 14–18)
LYMPHOCYTES # BLD AUTO: 0.83 K/UL — LOW (ref 1.2–3.4)
LYMPHOCYTES # BLD AUTO: 9.6 % — LOW (ref 20.5–51.1)
MAGNESIUM SERPL-MCNC: 2 MG/DL — SIGNIFICANT CHANGE UP (ref 1.8–2.4)
MANUAL SMEAR VERIFICATION: SIGNIFICANT CHANGE UP
MCHC RBC-ENTMCNC: 27.5 PG — SIGNIFICANT CHANGE UP (ref 27–31)
MCHC RBC-ENTMCNC: 33.9 G/DL — SIGNIFICANT CHANGE UP (ref 32–37)
MCV RBC AUTO: 81.1 FL — SIGNIFICANT CHANGE UP (ref 80–94)
MONOCYTES # BLD AUTO: 1.06 K/UL — HIGH (ref 0.1–0.6)
MONOCYTES NFR BLD AUTO: 12.2 % — HIGH (ref 1.7–9.3)
NEUTROPHILS # BLD AUTO: 6.17 K/UL — SIGNIFICANT CHANGE UP (ref 1.4–6.5)
NEUTROPHILS NFR BLD AUTO: 71.3 % — SIGNIFICANT CHANGE UP (ref 42.2–75.2)
PLAT MORPH BLD: ABNORMAL
PLATELET # BLD AUTO: 304 K/UL — SIGNIFICANT CHANGE UP (ref 130–400)
PMV BLD: 8.6 FL — SIGNIFICANT CHANGE UP (ref 7.4–10.4)
POIKILOCYTOSIS BLD QL AUTO: SLIGHT — SIGNIFICANT CHANGE UP
POLYCHROMASIA BLD QL SMEAR: SLIGHT — SIGNIFICANT CHANGE UP
POTASSIUM SERPL-MCNC: 4.4 MMOL/L — SIGNIFICANT CHANGE UP (ref 3.5–5)
POTASSIUM SERPL-SCNC: 4.4 MMOL/L — SIGNIFICANT CHANGE UP (ref 3.5–5)
PROT SERPL-MCNC: 6.4 G/DL — SIGNIFICANT CHANGE UP (ref 6–8)
RBC # BLD: 3.38 M/UL — LOW (ref 4.7–6.1)
RBC # FLD: 17.7 % — HIGH (ref 11.5–14.5)
RBC BLD AUTO: ABNORMAL
SODIUM SERPL-SCNC: 136 MMOL/L — SIGNIFICANT CHANGE UP (ref 135–146)
SPECIMEN SOURCE: SIGNIFICANT CHANGE UP
STOMATOCYTES BLD QL SMEAR: SLIGHT — SIGNIFICANT CHANGE UP
VARIANT LYMPHS # BLD: 2.6 % — SIGNIFICANT CHANGE UP (ref 0–5)
VARIANT LYMPHS NFR BLD MANUAL: 2.6 % — SIGNIFICANT CHANGE UP (ref 0–5)
WBC # BLD: 8.65 K/UL — SIGNIFICANT CHANGE UP (ref 4.8–10.8)
WBC # FLD AUTO: 8.65 K/UL — SIGNIFICANT CHANGE UP (ref 4.8–10.8)

## 2025-03-13 PROCEDURE — 99232 SBSQ HOSP IP/OBS MODERATE 35: CPT

## 2025-03-13 PROCEDURE — 71046 X-RAY EXAM CHEST 2 VIEWS: CPT | Mod: 26

## 2025-03-13 PROCEDURE — 93010 ELECTROCARDIOGRAM REPORT: CPT

## 2025-03-13 RX ORDER — VANCOMYCIN HCL IN 5 % DEXTROSE 1.5G/250ML
1250 PLASTIC BAG, INJECTION (ML) INTRAVENOUS EVERY 12 HOURS
Refills: 0 | Status: DISCONTINUED | OUTPATIENT
Start: 2025-03-13 | End: 2025-03-14

## 2025-03-13 RX ORDER — INSULIN LISPRO 100 U/ML
10 INJECTION, SOLUTION INTRAVENOUS; SUBCUTANEOUS ONCE
Refills: 0 | Status: COMPLETED | OUTPATIENT
Start: 2025-03-13 | End: 2025-03-13

## 2025-03-13 RX ORDER — ACETAMINOPHEN 500 MG/5ML
1000 LIQUID (ML) ORAL ONCE
Refills: 0 | Status: COMPLETED | OUTPATIENT
Start: 2025-03-13 | End: 2025-03-13

## 2025-03-13 RX ORDER — METFORMIN HYDROCHLORIDE 850 MG/1
500 TABLET ORAL EVERY 12 HOURS
Refills: 0 | Status: DISCONTINUED | OUTPATIENT
Start: 2025-03-13 | End: 2025-03-14

## 2025-03-13 RX ADMIN — Medication 1000 MILLIGRAM(S): at 18:30

## 2025-03-13 RX ADMIN — HEPARIN SODIUM 5000 UNIT(S): 1000 INJECTION INTRAVENOUS; SUBCUTANEOUS at 06:22

## 2025-03-13 RX ADMIN — Medication 40 MILLIEQUIVALENT(S): at 11:38

## 2025-03-13 RX ADMIN — ACETYLCYSTEINE 4 MILLILITER(S): 200 INHALANT RESPIRATORY (INHALATION) at 10:00

## 2025-03-13 RX ADMIN — Medication 40 MILLIGRAM(S): at 06:22

## 2025-03-13 RX ADMIN — Medication 1000 MILLIGRAM(S): at 10:45

## 2025-03-13 RX ADMIN — ACETYLCYSTEINE 4 MILLILITER(S): 200 INHALANT RESPIRATORY (INHALATION) at 01:51

## 2025-03-13 RX ADMIN — Medication 400 MILLIGRAM(S): at 21:33

## 2025-03-13 RX ADMIN — Medication 500 MICROGRAM(S): at 10:00

## 2025-03-13 RX ADMIN — IRON SUCROSE 100 MILLIGRAM(S): 20 INJECTION, SOLUTION INTRAVENOUS at 11:39

## 2025-03-13 RX ADMIN — DILTIAZEM HYDROCHLORIDE 60 MILLIGRAM(S): 240 TABLET, EXTENDED RELEASE ORAL at 21:39

## 2025-03-13 RX ADMIN — Medication 166.67 MILLIGRAM(S): at 18:06

## 2025-03-13 RX ADMIN — Medication 500 MICROGRAM(S): at 15:17

## 2025-03-13 RX ADMIN — Medication 400 MILLIGRAM(S): at 10:00

## 2025-03-13 RX ADMIN — METFORMIN HYDROCHLORIDE 500 MILLIGRAM(S): 850 TABLET ORAL at 18:09

## 2025-03-13 RX ADMIN — FOLIC ACID 1 MILLIGRAM(S): 1 TABLET ORAL at 11:38

## 2025-03-13 RX ADMIN — TAMSULOSIN HYDROCHLORIDE 0.4 MILLIGRAM(S): 0.4 CAPSULE ORAL at 21:33

## 2025-03-13 RX ADMIN — Medication 5 MILLIGRAM(S): at 21:34

## 2025-03-13 RX ADMIN — INSULIN LISPRO 10 UNIT(S): 100 INJECTION, SOLUTION INTRAVENOUS; SUBCUTANEOUS at 12:00

## 2025-03-13 RX ADMIN — Medication 1 APPLICATION(S): at 07:05

## 2025-03-13 RX ADMIN — CEFEPIME 100 MILLIGRAM(S): 2 INJECTION, POWDER, FOR SOLUTION INTRAVENOUS at 18:06

## 2025-03-13 RX ADMIN — INSULIN LISPRO 4: 100 INJECTION, SOLUTION INTRAVENOUS; SUBCUTANEOUS at 11:37

## 2025-03-13 RX ADMIN — ATORVASTATIN CALCIUM 80 MILLIGRAM(S): 80 TABLET, FILM COATED ORAL at 21:34

## 2025-03-13 RX ADMIN — METOPROLOL SUCCINATE 50 MILLIGRAM(S): 50 TABLET, EXTENDED RELEASE ORAL at 06:23

## 2025-03-13 RX ADMIN — FUROSEMIDE 40 MILLIGRAM(S): 10 INJECTION INTRAMUSCULAR; INTRAVENOUS at 06:22

## 2025-03-13 RX ADMIN — Medication 400 MILLIGRAM(S): at 18:00

## 2025-03-13 RX ADMIN — DILTIAZEM HYDROCHLORIDE 60 MILLIGRAM(S): 240 TABLET, EXTENDED RELEASE ORAL at 13:10

## 2025-03-13 RX ADMIN — FUROSEMIDE 40 MILLIGRAM(S): 10 INJECTION INTRAMUSCULAR; INTRAVENOUS at 18:08

## 2025-03-13 RX ADMIN — CEFEPIME 100 MILLIGRAM(S): 2 INJECTION, POWDER, FOR SOLUTION INTRAVENOUS at 06:23

## 2025-03-13 RX ADMIN — POLYETHYLENE GLYCOL 3350 17 GRAM(S): 17 POWDER, FOR SOLUTION ORAL at 11:39

## 2025-03-13 RX ADMIN — INSULIN LISPRO 2: 100 INJECTION, SOLUTION INTRAVENOUS; SUBCUTANEOUS at 16:08

## 2025-03-13 RX ADMIN — DILTIAZEM HYDROCHLORIDE 60 MILLIGRAM(S): 240 TABLET, EXTENDED RELEASE ORAL at 06:22

## 2025-03-13 RX ADMIN — Medication 500 MICROGRAM(S): at 01:50

## 2025-03-13 RX ADMIN — HEPARIN SODIUM 5000 UNIT(S): 1000 INJECTION INTRAVENOUS; SUBCUTANEOUS at 13:10

## 2025-03-13 RX ADMIN — GABAPENTIN 300 MILLIGRAM(S): 400 CAPSULE ORAL at 13:10

## 2025-03-13 RX ADMIN — ACETYLCYSTEINE 4 MILLILITER(S): 200 INHALANT RESPIRATORY (INHALATION) at 15:17

## 2025-03-13 RX ADMIN — GABAPENTIN 300 MILLIGRAM(S): 400 CAPSULE ORAL at 06:22

## 2025-03-13 RX ADMIN — GABAPENTIN 300 MILLIGRAM(S): 400 CAPSULE ORAL at 21:33

## 2025-03-13 RX ADMIN — Medication 2 TABLET(S): at 21:33

## 2025-03-13 RX ADMIN — Medication 81 MILLIGRAM(S): at 11:38

## 2025-03-13 RX ADMIN — ACETYLCYSTEINE 4 MILLILITER(S): 200 INHALANT RESPIRATORY (INHALATION) at 19:59

## 2025-03-13 RX ADMIN — Medication 500 MILLIGRAM(S): at 11:38

## 2025-03-13 RX ADMIN — INSULIN LISPRO 1: 100 INJECTION, SOLUTION INTRAVENOUS; SUBCUTANEOUS at 07:07

## 2025-03-13 RX ADMIN — METOPROLOL SUCCINATE 50 MILLIGRAM(S): 50 TABLET, EXTENDED RELEASE ORAL at 13:10

## 2025-03-13 RX ADMIN — METOPROLOL SUCCINATE 50 MILLIGRAM(S): 50 TABLET, EXTENDED RELEASE ORAL at 21:39

## 2025-03-13 RX ADMIN — Medication 166.67 MILLIGRAM(S): at 06:49

## 2025-03-13 RX ADMIN — Medication 400 MILLIGRAM(S): at 23:01

## 2025-03-13 RX ADMIN — Medication 500 MICROGRAM(S): at 19:59

## 2025-03-13 RX ADMIN — HEPARIN SODIUM 5000 UNIT(S): 1000 INJECTION INTRAVENOUS; SUBCUTANEOUS at 21:33

## 2025-03-13 RX ADMIN — INSULIN LISPRO 1: 100 INJECTION, SOLUTION INTRAVENOUS; SUBCUTANEOUS at 21:58

## 2025-03-13 NOTE — PROGRESS NOTE ADULT - SUBJECTIVE AND OBJECTIVE BOX
CTU Attending Progress Daily Note       Procedure: CABG  POD#: 8    He has history of HLD (hyperlipidemia)  Hypertriglyceridemia  DM (diabetes mellitus)  CAD (coronary artery disease)  HTN (hypertension)    HPI:  Mr. BYRON NAJERA is a 54 year M with PMhx  includes DLD, HTN, CAD. Mr. NAJERA was worked up for complaints SOB on exertion. He  had a cardiac catheterization which revealed multivessel disease. On 03/05/25, he underwent surgical myocardial revascularization.    Hospital Course:  3/5 CABG 4 LIMA + Lt Radial  OR C2, Alb1, ,      on Levo and Vaso: Albumin 500 given  post-extubation hypercapnia on BIPAP  chest tube 300ml: Hb 7.7,              1 PRBCs 1 Platelets  3/6 Stable hemodynamics, off pressors, start BB  3/7 started diuresis  3/9 febrile, cultured, thoracentesis done 400cc removed  3/10 CT chest -> chest tube by thoracic surgery, lopressors / cardizem increased   3/11: 2 PRBC given  3/12: started vanco        OBJECTIVE:  ICU Vital Signs Last 24 Hrs  T(C): 37.2 (13 Mar 2025 08:00), Max: 37.3 (13 Mar 2025 00:00)  T(F): 98.9 (13 Mar 2025 08:00), Max: 99.2 (13 Mar 2025 00:00)  HR: 97 (13 Mar 2025 10:00) (88 - 120)  BP: 109/67 (13 Mar 2025 10:00) (97/60 - 137/68)  BP(mean): 83 (13 Mar 2025 10:00) (74 - 95)  ABP: --  ABP(mean): --  RR: 23 (13 Mar 2025 10:00) (12 - 27)  SpO2: 100% (13 Mar 2025 10:00) (95% - 100%)    O2 Parameters below as of 13 Mar 2025 10:00  Patient On (Oxygen Delivery Method): room air          I&O's Summary    12 Mar 2025 07:01  -  13 Mar 2025 07:00  --------------------------------------------------------  IN: 1730 mL / OUT: 6050 mL / NET: -4320 mL    13 Mar 2025 07:01  -  13 Mar 2025 11:06  --------------------------------------------------------  IN: 370 mL / OUT: 0 mL / NET: 370 mL      I&O's Detail    12 Mar 2025 07:01  -  13 Mar 2025 07:00  --------------------------------------------------------  IN:    IV PiggyBack: 450 mL    Oral Fluid: 1280 mL  Total IN: 1730 mL    OUT:    Voided (mL): 6050 mL  Total OUT: 6050 mL    Total NET: -4320 mL      13 Mar 2025 07:01  -  13 Mar 2025 11:06  --------------------------------------------------------  IN:    IV PiggyBack: 250 mL    Oral Fluid: 120 mL  Total IN: 370 mL    OUT:  Total OUT: 0 mL    Total NET: 370 mL      CAPILLARY BLOOD GLUCOSE      POCT Blood Glucose.: 194 mg/dL (13 Mar 2025 06:55)  POCT Blood Glucose.: 248 mg/dL (12 Mar 2025 21:34)  POCT Blood Glucose.: 278 mg/dL (12 Mar 2025 16:01)  POCT Blood Glucose.: 235 mg/dL (12 Mar 2025 11:58)    LABS:                          9.3    8.65  )-----------( 304      ( 13 Mar 2025 02:15 )             27.4     03-13    136  |  94[L]  |  11  ----------------------------<  178[H]  4.4   |  29  |  0.7    Ca    8.6      13 Mar 2025 02:15  Mg     2.0     03-13    TPro  6.4  /  Alb  3.7  /  TBili  1.0  /  DBili  x   /  AST  14  /  ALT  28  /  AlkPhos  143[H]  03-13      Urinalysis Basic - ( 13 Mar 2025 02:15 )    Color: x / Appearance: x / SG: x / pH: x  Gluc: 178 mg/dL / Ketone: x  / Bili: x / Urobili: x   Blood: x / Protein: x / Nitrite: x   Leuk Esterase: x / RBC: x / WBC x   Sq Epi: x / Non Sq Epi: x / Bacteria: x        Culture - Sputum (collected 12 Mar 2025 12:30)  Source: Sputum Sputum  Gram Stain (13 Mar 2025 07:19):    Moderate polymorphonuclear leukocytes per low power field    Few Squamous epithelial cells per low power field    Few Gram Positive Rods seen per oil power field    Few Gram Positive Cocci in Pairs and Chains seen per oil power field    Rare Gram Negative Rods seen per oil power field    Rare Gram Positive Cocci in Clusters seen per oil power field      Home Medications:  aspirin 81 mg oral delayed release capsule: orally once a day (05 Mar 2025 06:53)  atorvastatin 80 mg oral tablet: 1 tab(s) orally once a day (05 Mar 2025 06:53)  icosapent 1 g oral capsule: 2 cap(s) orally once a day (05 Mar 2025 06:53)  losartan 25 mg oral tablet: 1 tab(s) orally once a day (05 Mar 2025 06:53)  metFORMIN 500 mg oral tablet: 1 tab(s) orally 2 times a day (05 Mar 2025 06:53)  metoprolol succinate 25 mg oral tablet, extended release: 1 tab(s) orally once a day (05 Mar 2025 06:53)  Ozempic 2 mg/1.5 mL (1 mg dose) subcutaneous solution: subcutaneous once a week (05 Mar 2025 06:53)    HOSPITAL MEDICATIONS:  MEDICATIONS  (STANDING):  acetaminophen   IVPB .. 1000 milliGRAM(s) IV Intermittent once  acetylcysteine 10%  Inhalation 4 milliLiter(s) Inhalation every 6 hours  ascorbic acid 500 milliGRAM(s) Oral daily  aspirin enteric coated 81 milliGRAM(s) Oral daily  atorvastatin 80 milliGRAM(s) Oral at bedtime  bisacodyl Suppository 10 milliGRAM(s) Rectal once  cefepime   IVPB      cefepime   IVPB 1000 milliGRAM(s) IV Intermittent every 12 hours  chlorhexidine 2% Cloths 1 Application(s) Topical daily  dextrose 5%. 1000 milliLiter(s) (50 mL/Hr) IV Continuous <Continuous>  dextrose 5%. 1000 milliLiter(s) (100 mL/Hr) IV Continuous <Continuous>  dextrose 50% Injectable 50 milliLiter(s) IV Push every 15 minutes  dextrose 50% Injectable 25 milliLiter(s) IV Push every 15 minutes  diltiazem    Tablet 60 milliGRAM(s) Oral every 8 hours  folic acid 1 milliGRAM(s) Oral daily  furosemide   Injectable 40 milliGRAM(s) IV Push every 12 hours  gabapentin 300 milliGRAM(s) Oral three times a day  glucagon  Injectable 1 milliGRAM(s) IntraMuscular once  heparin   Injectable 5000 Unit(s) SubCutaneous every 8 hours  influenza   Vaccine 0.5 milliLiter(s) IntraMuscular once  insulin lispro (ADMELOG) corrective regimen sliding scale   SubCutaneous three times a day before meals  insulin lispro (ADMELOG) corrective regimen sliding scale   SubCutaneous at bedtime  ipratropium    for Nebulization 500 MICROGram(s) Nebulizer every 6 hours  iron sucrose IVPB 200 milliGRAM(s) IV Intermittent every 24 hours  melatonin 5 milliGRAM(s) Oral at bedtime  metoprolol tartrate 50 milliGRAM(s) Oral every 8 hours  pantoprazole    Tablet 40 milliGRAM(s) Oral before breakfast  polyethylene glycol 3350 17 Gram(s) Oral daily  potassium chloride    Tablet ER 40 milliEquivalent(s) Oral daily  senna 2 Tablet(s) Oral at bedtime  tamsulosin 0.4 milliGRAM(s) Oral at bedtime  vancomycin  IVPB 1250 milliGRAM(s) IV Intermittent every 12 hours    MEDICATIONS  (PRN):  dextrose Oral Gel 15 Gram(s) Oral once PRN Blood Glucose LESS THAN 70 milliGRAM(s)/deciliter  ibuprofen  Tablet. 400 milliGRAM(s) Oral every 6 hours PRN Temp greater or equal to 38C (100.4F)  melatonin 5 milliGRAM(s) Oral at bedtime PRN Insomnia  ondansetron Injectable 4 milliGRAM(s) IV Push every 4 hours PRN Nausea and/or Vomiting    RADIOLOGY:  Chest X-ray Reviewed    REVIEW OF SYSTEMS:  CONSTITUTIONAL: [X] all negative; [ ] weakness, [ ] fevers, [ ] chills  EYES/ENT: [X] all negative; [ ] visual changes, [ ] vertigo, [ ] throat pain   NECK: [X] all negative; [ ] pain, [ ] stiffness  RESPIRATORY: [x] all negative, [ ] cough, [ ] wheezing, [ ] hemoptysis, [ ] shortness of breath  CARDIOVASCULAR: [x] all negative; [ ] chest pain, [ ] palpitations, [ ] orthopnea  GASTROINTESTINAL: [X] all negative; [ ]abdominal pain, [ ] nausea, [ ] vomiting, [ ] hematemesis, [ ] diarrhea, [ ] constipation, [ ] melena, [ ] hematochezia.  GENITOURINARY: [X] all negative; [ ] dysuria, [ ] frequency, [ ] hematuria  NEUROLOGICAL: [X] all negative; [ ] numbness, [ ] weakness  SKIN: [X] all negative; [ ] itching, [ ] burning, [ ] rashes, [ ] lesions   All other review of systems is negative unless indicated above.  [  ] Unable to assess ROS because         PHYSICAL EXAM:          CONSTITUTIONAL: Well-developed; well-nourished; in no acute distress.   	SKIN: warm, dry  	HEAD: Normocephalic; atraumatic.  	EYES: PERRL, EOM, no conj injection, sclera clear  	ENT: No nasal discharge; airway clear.  	NECK: Supple; non tender.   	CARD: S1, S2 normal; no murmurs, gallops, or rubs. Regular rate and rhythm.  no carotid bruits  	RESP: CTA B/L; good air movement No wheezes, rales or rhonchi.  	ABD: Soft, not tender, not distended,  no rebound or guarding, bowel sounds present  	EXT: Normal ROM.  No clubbing, cyanosis or edema.   warm and well perfused.  pulses palpable  	NEURO: Alert, awake, motor 5/5 R, 5/5 L

## 2025-03-13 NOTE — PHARMACOTHERAPY INTERVENTION NOTE - COMMENTS
As per policy, ordered a vancomycin trough for 3/13 @2330 in order to assist with vancomycin pharmacokinetic monitoring.      Sanjiv MalikD   Clinical Pharmacy Specialist, Infectious Diseases  Tele-Antimicrobial Stewardship Program (Tele-[facility])  Tele-ASP Phone: [phone]  
Recommended to adjust vancomycin dose from 1000 mg IV q12h to 1250 mg IV q12h. As per [employer] calculations, predicted vancomycin AUC/OLIVA at steady-state is 322 mg/L*h, which is lower than the therapeutic range of 400 - 600 mg/L*h. Increasing the dose is predicted to yield an AUC/OLIVA of 403 mg/L*h. Scr 0.7.      Nancy Garcia PharmD   Clinical Pharmacy Specialist, Infectious Diseases  Tele-Antimicrobial Stewardship Program (Tele-[facility])  Tele-[facility] Phone: [phone]

## 2025-03-13 NOTE — PROGRESS NOTE ADULT - ASSESSMENT
Assessment   s/p CABG POD 8      Plan:    Neuro:  Multimodal pain management  Tylenol, Lidoderm patch, gabapentin, opiates as needed  Monitor neuro status in the post op period    CV:  S/P CABG  ASA, bet blockers, Statins  HTN / tachy  Lopressor 50Q8  Cardizem 60Q8  Monitor perfusion, , lactate, UOP, index and MVO2   Maintain MAP > 65  Continue diuresis    Resp:  L lung atelactasis - CT chest with loculated effusion, atelactasis  S/p chest tube by thoracic surgery - not draining - removed  Supplemental oxygen to maintain sats > 92%  Continuous pulse oximetry monitoring  IS / Chest PT  Nebulizers as needed  Flutter valve  Nebulizers  IPV    GI:  Advance diet - cardiac  Bowel Regimen - escalate as needed  PUD ppx per protocol    Renal  High risk for TERENCE post surgery  Monitor UOP, lytes, creatinine  lasix today - goal negative 1L   Add metolazone 2.5  Keep K > 4, Mg > 2    Endo:  Tight glucose control per CTICU protocol  SSI    Heme:  Acute blood loss anemia post surgery  IV iron / FA / MVI  Hgb low s/p 2 PRBC  DVT ppx    ID:  Cefepime + Vanco for possible PNA  F/up cultures  MRSA negative  UA negative  Monitor fever curve and WBC count      Upon my evaluation, the above patient has a high probability of imminent or life threatening deterioration due to a high risk for Shock, Cardiogenic shock, arrythmia, acute blood loss, acute kidney injury and acute pulmonary insufficiency which required my direct attention, intervention, and personal management.  Total critical care time indicated in the note includes review of radiology results, ordering, interpreting and reviewing diagnostic studies / lab tests with immediate assessment and treatment, consultant collaboration on findings and treatment options, monitoring for potential decompensation, obtaining history from family, EMT, nursing home staff and/or treating physicians; directing the titration of pressors, oxygen support devices, fluid resuscitation, interpretation of cardiac output measurements, interpretation of radiological assessments, interpretation of EKG / rhythm strips, telemetry.  This time did not overlap with the management of other patients or performing separately reportable procedures.      Management of this patient was discussed with the CT Surgery/Thoracic surgery/Structural Cardiology attending and mid-level providers.    I acknowledge the use of copied documentation.  All copy forward documentation is my own and has been reviewed and revised as appropriate.  If no changes were made, it is because that information remains the same.

## 2025-03-13 NOTE — PROGRESS NOTE ADULT - SUBJECTIVE AND OBJECTIVE BOX
OPERATIVE PROCEDURE(s):                POD #                       54yMale  SURGEON(s): YARED Babcock  SUBJECTIVE ASSESSMENT:   Vital Signs Last 24 Hrs  T(F): 98.9 (13 Mar 2025 08:00), Max: 99.2 (13 Mar 2025 00:00)  HR: 88 (13 Mar 2025 08:00) (88 - 120)  BP: 97/60 (13 Mar 2025 08:00) (97/60 - 137/68)  BP(mean): 74 (13 Mar 2025 08:00) (74 - 95)  RR: 25 (13 Mar 2025 08:00) (12 - 27)  SpO2: 95% (13 Mar 2025 08:00) (95% - 100%)      I&O's Detail    12 Mar 2025 07:01  -  13 Mar 2025 07:00  --------------------------------------------------------  IN:    IV PiggyBack: 450 mL    Oral Fluid: 1280 mL  Total IN: 1730 mL    OUT:    Voided (mL): 6050 mL  Total OUT: 6050 mL        Net:   I&O's Detail    11 Mar 2025 07:01  -  12 Mar 2025 07:00  --------------------------------------------------------  Total NET: -2479 mL      12 Mar 2025 07:01  -  13 Mar 2025 07:00  --------------------------------------------------------  Total NET: -4320 mL        CAPILLARY BLOOD GLUCOSE      POCT Blood Glucose.: 194 mg/dL (13 Mar 2025 06:55)  POCT Blood Glucose.: 248 mg/dL (12 Mar 2025 21:34)  POCT Blood Glucose.: 278 mg/dL (12 Mar 2025 16:01)  POCT Blood Glucose.: 235 mg/dL (12 Mar 2025 11:58)    Physical Exam:  General: NAD; A&Ox3/Patient is intubated and sedated  Cardiac: S1/S2, RRR, no murmur, no rubs  Lungs: unlabored respirations, CTA b/l, no wheeze, no rales, no crackles  Abdomen: Soft/NT/ND; positive bowel sounds x 4  Sternum: Intact, no click, incision healing well with no drainage  Incisions: Incisions clean/dry/intact  Extremities: No edema b/l lower extremities; good capillary refill; no cyanosis; palpable 1+ pedal pulses b/l    Central Venous Catheter: Yes[]  No[] , If Yes indication:           Day #  Moore Catheter: Yes  [] , No  [] , If yes indication:                      Day #  NGT: Yes [] No [] ,    If Yes Placement:                                     Day #  EPICARDIAL WIRES:  [] YES [] NO                                              Day #  BOWEL MOVEMENT:  [] YES [] NO, If No, Timing since last BM:      Day #  CHEST TUBE(Left/Right):  [] YES [] NO, If yes -  AIR LEAKS:  [] YES [] NO        LABS:                        9.3[L]  8.65  )-----------( 304      ( 13 Mar 2025 02:15 )             27.4[L]                        7.7[L]  8.93  )-----------( 235      ( 12 Mar 2025 04:00 )             23.2[L]    03-13    136  |  94[L]  |  11  ----------------------------<  178[H]  4.4   |  29  |  0.7  03-12    136  |  100  |  12  ----------------------------<  138[H]  4.7   |  28  |  0.7    Ca    8.6      13 Mar 2025 02:15  Mg     2.0     03-13    TPro  6.4 [6.0 - 8.0]  /  Alb  3.7 [3.5 - 5.2]  /  TBili  1.0 [0.2 - 1.2]  /  DBili  x   /  AST  14 [0 - 41]  /  ALT  28 [0 - 41]  /  AlkPhos  143[H] [30 - 115]  03-13      Urinalysis Basic - ( 13 Mar 2025 02:15 )    Color: x / Appearance: x / SG: x / pH: x  Gluc: 178 mg/dL / Ketone: x  / Bili: x / Urobili: x   Blood: x / Protein: x / Nitrite: x   Leuk Esterase: x / RBC: x / WBC x   Sq Epi: x / Non Sq Epi: x / Bacteria: x        RADIOLOGY & ADDITIONAL TESTS:  CXR:  EKG:  MEDICATIONS  (STANDING):  acetylcysteine 10%  Inhalation 4 milliLiter(s) Inhalation every 6 hours  ascorbic acid 500 milliGRAM(s) Oral daily  aspirin enteric coated 81 milliGRAM(s) Oral daily  atorvastatin 80 milliGRAM(s) Oral at bedtime  bisacodyl Suppository 10 milliGRAM(s) Rectal once  cefepime   IVPB      cefepime   IVPB 1000 milliGRAM(s) IV Intermittent every 12 hours  chlorhexidine 2% Cloths 1 Application(s) Topical daily  dextrose 5%. 1000 milliLiter(s) (50 mL/Hr) IV Continuous <Continuous>  dextrose 5%. 1000 milliLiter(s) (100 mL/Hr) IV Continuous <Continuous>  dextrose 50% Injectable 50 milliLiter(s) IV Push every 15 minutes  dextrose 50% Injectable 25 milliLiter(s) IV Push every 15 minutes  diltiazem    Tablet 60 milliGRAM(s) Oral every 8 hours  folic acid 1 milliGRAM(s) Oral daily  furosemide   Injectable 40 milliGRAM(s) IV Push every 12 hours  gabapentin 300 milliGRAM(s) Oral three times a day  glucagon  Injectable 1 milliGRAM(s) IntraMuscular once  heparin   Injectable 5000 Unit(s) SubCutaneous every 8 hours  influenza   Vaccine 0.5 milliLiter(s) IntraMuscular once  insulin lispro (ADMELOG) corrective regimen sliding scale   SubCutaneous three times a day before meals  insulin lispro (ADMELOG) corrective regimen sliding scale   SubCutaneous at bedtime  ipratropium    for Nebulization 500 MICROGram(s) Nebulizer every 6 hours  iron sucrose IVPB 200 milliGRAM(s) IV Intermittent every 24 hours  melatonin 5 milliGRAM(s) Oral at bedtime  metoprolol tartrate 50 milliGRAM(s) Oral every 8 hours  pantoprazole    Tablet 40 milliGRAM(s) Oral before breakfast  polyethylene glycol 3350 17 Gram(s) Oral daily  potassium chloride    Tablet ER 40 milliEquivalent(s) Oral daily  senna 2 Tablet(s) Oral at bedtime  tamsulosin 0.4 milliGRAM(s) Oral at bedtime  vancomycin  IVPB 1250 milliGRAM(s) IV Intermittent every 12 hours    MEDICATIONS  (PRN):  dextrose Oral Gel 15 Gram(s) Oral once PRN Blood Glucose LESS THAN 70 milliGRAM(s)/deciliter  ibuprofen  Tablet. 400 milliGRAM(s) Oral every 6 hours PRN Temp greater or equal to 38C (100.4F)  melatonin 5 milliGRAM(s) Oral at bedtime PRN Insomnia  ondansetron Injectable 4 milliGRAM(s) IV Push every 4 hours PRN Nausea and/or Vomiting    HEPARIN:  [] YES [] NO  Dose: XX UNITS/HR UNITS Q8H  LOVENOX:[] YES [] NO  Dose: XX mg Q24H  COUMADIN: []  YES [] NO  Dose: XX mg  Q24H  SCD's: YES b/l  GI Prophylaxis: Protonix [], Pepcid [], None [], (Contra-indication:.....)    Post-Op Beta-Blockers: Yes [], No[], If No, then contraindication:  Post-Op Aspirin: Yes [],  No [], If No, then contraindication:  Post-Op Statin: Yes [], No[], If No, then contraindication:  Allergies    No Known Allergies    Intolerances      Ambulation/Activity Status:    Assessment/Plan:  54y Male status-post .....  - Case and plan discussed with CTU Intensivist and CT Surgeon - Dr. Babcock/Nuno/Nina  - Continue CTU supportive care    - Continue DVT/GI prophylaxis  - Incentive Spirometry 10 times an hour  - Continue to advance physical activity as tolerated and continue PT/OT as directed  1. CAD: Continue ASA, statin, BB  2. HTN:   3. A. Fib:   4. COPD/Hypoxia:   5. DM/Glucose Control:     Social Service Disposition:     OPERATIVE PROCEDURE(s):     CABGx4 + left radial harvest            POD #  8                     54yMale  SURGEON(s): YARED Babcock  SUBJECTIVE ASSESSMENT: pt seen and examined. no acute complaints at this time.   Vital Signs Last 24 Hrs  T(F): 98.9 (13 Mar 2025 08:00), Max: 99.2 (13 Mar 2025 00:00)  HR: 88 (13 Mar 2025 08:00) (88 - 120)  BP: 97/60 (13 Mar 2025 08:00) (97/60 - 137/68)  BP(mean): 74 (13 Mar 2025 08:00) (74 - 95)  RR: 25 (13 Mar 2025 08:00) (12 - 27)  SpO2: 95% (13 Mar 2025 08:00) (95% - 100%)      I&O's Detail    12 Mar 2025 07:01  -  13 Mar 2025 07:00  --------------------------------------------------------  IN:    IV PiggyBack: 450 mL    Oral Fluid: 1280 mL  Total IN: 1730 mL    OUT:    Voided (mL): 6050 mL  Total OUT: 6050 mL        Net:   I&O's Detail    11 Mar 2025 07:01  -  12 Mar 2025 07:00  --------------------------------------------------------  Total NET: -2479 mL      12 Mar 2025 07:01  -  13 Mar 2025 07:00  --------------------------------------------------------  Total NET: -4320 mL        CAPILLARY BLOOD GLUCOSE      POCT Blood Glucose.: 194 mg/dL (13 Mar 2025 06:55)  POCT Blood Glucose.: 248 mg/dL (12 Mar 2025 21:34)  POCT Blood Glucose.: 278 mg/dL (12 Mar 2025 16:01)  POCT Blood Glucose.: 235 mg/dL (12 Mar 2025 11:58)    Physical Exam:   General: NAD; A&Ox3  Neuro: pupils equal and reactive, speech clear, no overt sensory or motor deficits  Cardiac: S1/S2, RRR, no murmur, no rubs  Lungs: unlabored shallow respirations, bilateral bases decreased  Abdomen: Soft/NT/ND; positive bowel sounds x 4  Sternum: Intact, no click, incision healing well with no drainage  Incisions: Incisions clean/dry/intact  Extremities: Mild edema b/l lower extremities; good capillary refill; no cyanosis; palpable 1+ pedal pulses b/l      BOWEL MOVEMENT:  [X] YES [] NO, If No, Timing since last BM Day #      LABS:                        9.3[L]  8.65  )-----------( 304      ( 13 Mar 2025 02:15 )             27.4[L]                        7.7[L]  8.93  )-----------( 235      ( 12 Mar 2025 04:00 )             23.2[L]    03-13    136  |  94[L]  |  11  ----------------------------<  178[H]  4.4   |  29  |  0.7  03-12    136  |  100  |  12  ----------------------------<  138[H]  4.7   |  28  |  0.7    Ca    8.6      13 Mar 2025 02:15  Mg     2.0     03-13    TPro  6.4 [6.0 - 8.0]  /  Alb  3.7 [3.5 - 5.2]  /  TBili  1.0 [0.2 - 1.2]  /  DBili  x   /  AST  14 [0 - 41]  /  ALT  28 [0 - 41]  /  AlkPhos  143[H] [30 - 115]  03-13      Urinalysis Basic - ( 13 Mar 2025 02:15 )    Color: x / Appearance: x / SG: x / pH: x  Gluc: 178 mg/dL / Ketone: x  / Bili: x / Urobili: x   Blood: x / Protein: x / Nitrite: x   Leuk Esterase: x / RBC: x / WBC x   Sq Epi: x / Non Sq Epi: x / Bacteria: x        RADIOLOGY & ADDITIONAL TESTS:  CXR: < from: Xray Chest 2 Views PA/Lat (03.13.25 @ 08:08) >  IMPRESSION:    Slightly decreasing left opacity/effusion.    < end of copied text >    EKG: < from: 12 Lead ECG (03.13.25 @ 07:24) >    Ventricular Rate 100 BPM    Atrial Rate 100 BPM    P-R Interval 138 ms    QRS Duration 80 ms    Q-T Interval 344 ms    QTC Calculation(Bazett) 443 ms    P Axis 31 degrees    R Axis -20 degrees    T Axis 32 degrees    Diagnosis Line Normal sinus rhythm  Inferior infarct , age undetermined  Possible Anterior infarct , age undetermined  Abnormal ECG    < end of copied text >    MEDICATIONS  (STANDING):  acetylcysteine 10%  Inhalation 4 milliLiter(s) Inhalation every 6 hours  ascorbic acid 500 milliGRAM(s) Oral daily  aspirin enteric coated 81 milliGRAM(s) Oral daily  atorvastatin 80 milliGRAM(s) Oral at bedtime  bisacodyl Suppository 10 milliGRAM(s) Rectal once  cefepime   IVPB      cefepime   IVPB 1000 milliGRAM(s) IV Intermittent every 12 hours  chlorhexidine 2% Cloths 1 Application(s) Topical daily  dextrose 5%. 1000 milliLiter(s) (50 mL/Hr) IV Continuous <Continuous>  dextrose 5%. 1000 milliLiter(s) (100 mL/Hr) IV Continuous <Continuous>  dextrose 50% Injectable 50 milliLiter(s) IV Push every 15 minutes  dextrose 50% Injectable 25 milliLiter(s) IV Push every 15 minutes  diltiazem    Tablet 60 milliGRAM(s) Oral every 8 hours  folic acid 1 milliGRAM(s) Oral daily  furosemide   Injectable 40 milliGRAM(s) IV Push every 12 hours  gabapentin 300 milliGRAM(s) Oral three times a day  glucagon  Injectable 1 milliGRAM(s) IntraMuscular once  heparin   Injectable 5000 Unit(s) SubCutaneous every 8 hours  influenza   Vaccine 0.5 milliLiter(s) IntraMuscular once  insulin lispro (ADMELOG) corrective regimen sliding scale   SubCutaneous three times a day before meals  insulin lispro (ADMELOG) corrective regimen sliding scale   SubCutaneous at bedtime  ipratropium    for Nebulization 500 MICROGram(s) Nebulizer every 6 hours  iron sucrose IVPB 200 milliGRAM(s) IV Intermittent every 24 hours  melatonin 5 milliGRAM(s) Oral at bedtime  metoprolol tartrate 50 milliGRAM(s) Oral every 8 hours  pantoprazole    Tablet 40 milliGRAM(s) Oral before breakfast  polyethylene glycol 3350 17 Gram(s) Oral daily  potassium chloride    Tablet ER 40 milliEquivalent(s) Oral daily  senna 2 Tablet(s) Oral at bedtime  tamsulosin 0.4 milliGRAM(s) Oral at bedtime  vancomycin  IVPB 1250 milliGRAM(s) IV Intermittent every 12 hours    MEDICATIONS  (PRN):  dextrose Oral Gel 15 Gram(s) Oral once PRN Blood Glucose LESS THAN 70 milliGRAM(s)/deciliter  ibuprofen  Tablet. 400 milliGRAM(s) Oral every 6 hours PRN Temp greater or equal to 38C (100.4F)  melatonin 5 milliGRAM(s) Oral at bedtime PRN Insomnia  ondansetron Injectable 4 milliGRAM(s) IV Push every 4 hours PRN Nausea and/or Vomiting    HEPARIN:  [x] YES [] NO  Dose:5000 UNITS Q8H  SCD's: YES b/l  GI Prophylaxis: Protonix [x], Pepcid [], None [], (Contra-indication:.....)    Post-Op Beta-Blockers: Yes [x], No[], If No, then contraindication:  Post-Op Aspirin: Yes [x],  No [], If No, then contraindication:  Post-Op Statin: Yes [x], No[], If No, then contraindication:  Allergies    No Known Allergies    Intolerances      Ambulation/Activity Status: ambulate     Assessment/Plan:  54y Male status-post CABG x4               POD #8   - Case and plan discussed with CTU Intensivist and CT Surgeon - Dr. Babcock  - Continue CTU supportive care and ongoing plan of care as per continuing CTU rounds.   - Continue DVT/GI prophylaxis  - Incentive Spirometry 10 times an hour  - Continue to advance physical activity as tolerated and continue PT/OT as directed.   1. CAD s/p CABG: Continue ASA, statin, BB. Continue diuresis with Lasix 20mg qd.   2. HTN: Continue metoprolol 25mg bid. cardizem 60mg q8h   3. Post-op A. Fib ppx: monitor electrolytes   4. COPD/Hypoxia/Atelectasis on CXR: supplemental o2 needed, continue nebs, encourage cough and deep breathing  5. DM/Glucose Control (A1c 6.6): insulin sliding scale  6. Anemia 2/2 acute blood loss, pt asx: monitor H/H, active T&S, continue folic acid and IV iron  7. Febrile overnight: Tmax 38.1, blood cx and UA sent. CT chest non con ordered.    8, left pleural effusion s/p pig tail which is now removed, stable       Social Service Disposition: anticipate d/c home tomorrow

## 2025-03-14 ENCOUNTER — TRANSCRIPTION ENCOUNTER (OUTPATIENT)
Age: 55
End: 2025-03-14

## 2025-03-14 VITALS
HEART RATE: 96 BPM | OXYGEN SATURATION: 100 % | SYSTOLIC BLOOD PRESSURE: 106 MMHG | DIASTOLIC BLOOD PRESSURE: 60 MMHG | RESPIRATION RATE: 24 BRPM

## 2025-03-14 LAB
ALBUMIN SERPL ELPH-MCNC: 3.8 G/DL — SIGNIFICANT CHANGE UP (ref 3.5–5.2)
ALP SERPL-CCNC: 134 U/L — HIGH (ref 30–115)
ALT FLD-CCNC: 23 U/L — SIGNIFICANT CHANGE UP (ref 0–41)
ANION GAP SERPL CALC-SCNC: 14 MMOL/L — SIGNIFICANT CHANGE UP (ref 7–14)
AST SERPL-CCNC: 14 U/L — SIGNIFICANT CHANGE UP (ref 0–41)
BASOPHILS # BLD AUTO: 0.03 K/UL — SIGNIFICANT CHANGE UP (ref 0–0.2)
BASOPHILS NFR BLD AUTO: 0.3 % — SIGNIFICANT CHANGE UP (ref 0–1)
BILIRUB SERPL-MCNC: 0.8 MG/DL — SIGNIFICANT CHANGE UP (ref 0.2–1.2)
BUN SERPL-MCNC: 15 MG/DL — SIGNIFICANT CHANGE UP (ref 10–20)
CALCIUM SERPL-MCNC: 8.3 MG/DL — LOW (ref 8.4–10.5)
CHLORIDE SERPL-SCNC: 96 MMOL/L — LOW (ref 98–110)
CO2 SERPL-SCNC: 27 MMOL/L — SIGNIFICANT CHANGE UP (ref 17–32)
CREAT SERPL-MCNC: 0.7 MG/DL — SIGNIFICANT CHANGE UP (ref 0.7–1.5)
CULTURE RESULTS: ABNORMAL
EGFR: 110 ML/MIN/1.73M2 — SIGNIFICANT CHANGE UP
EGFR: 110 ML/MIN/1.73M2 — SIGNIFICANT CHANGE UP
EOSINOPHIL # BLD AUTO: 0.56 K/UL — SIGNIFICANT CHANGE UP (ref 0–0.7)
EOSINOPHIL NFR BLD AUTO: 5.6 % — SIGNIFICANT CHANGE UP (ref 0–8)
GLUCOSE BLDC GLUCOMTR-MCNC: 163 MG/DL — HIGH (ref 70–99)
GLUCOSE BLDC GLUCOMTR-MCNC: 184 MG/DL — HIGH (ref 70–99)
GLUCOSE BLDC GLUCOMTR-MCNC: 195 MG/DL — HIGH (ref 70–99)
GLUCOSE BLDC GLUCOMTR-MCNC: 222 MG/DL — HIGH (ref 70–99)
GLUCOSE SERPL-MCNC: 153 MG/DL — HIGH (ref 70–99)
HCT VFR BLD CALC: 29.7 % — LOW (ref 42–52)
HGB BLD-MCNC: 9.7 G/DL — LOW (ref 14–18)
IMM GRANULOCYTES NFR BLD AUTO: 2.6 % — HIGH (ref 0.1–0.3)
LYMPHOCYTES # BLD AUTO: 1.56 K/UL — SIGNIFICANT CHANGE UP (ref 1.2–3.4)
LYMPHOCYTES # BLD AUTO: 15.6 % — LOW (ref 20.5–51.1)
MAGNESIUM SERPL-MCNC: 2 MG/DL — SIGNIFICANT CHANGE UP (ref 1.8–2.4)
MCHC RBC-ENTMCNC: 26.9 PG — LOW (ref 27–31)
MCHC RBC-ENTMCNC: 32.7 G/DL — SIGNIFICANT CHANGE UP (ref 32–37)
MCV RBC AUTO: 82.5 FL — SIGNIFICANT CHANGE UP (ref 80–94)
MONOCYTES # BLD AUTO: 0.74 K/UL — HIGH (ref 0.1–0.6)
MONOCYTES NFR BLD AUTO: 7.4 % — SIGNIFICANT CHANGE UP (ref 1.7–9.3)
NEUTROPHILS # BLD AUTO: 6.82 K/UL — HIGH (ref 1.4–6.5)
NEUTROPHILS NFR BLD AUTO: 68.5 % — SIGNIFICANT CHANGE UP (ref 42.2–75.2)
NRBC BLD AUTO-RTO: 0 /100 WBCS — SIGNIFICANT CHANGE UP (ref 0–0)
PLATELET # BLD AUTO: 351 K/UL — SIGNIFICANT CHANGE UP (ref 130–400)
PMV BLD: 8.6 FL — SIGNIFICANT CHANGE UP (ref 7.4–10.4)
POTASSIUM SERPL-MCNC: 4.3 MMOL/L — SIGNIFICANT CHANGE UP (ref 3.5–5)
POTASSIUM SERPL-SCNC: 4.3 MMOL/L — SIGNIFICANT CHANGE UP (ref 3.5–5)
PROT SERPL-MCNC: 6.7 G/DL — SIGNIFICANT CHANGE UP (ref 6–8)
RBC # BLD: 3.6 M/UL — LOW (ref 4.7–6.1)
RBC # FLD: 18 % — HIGH (ref 11.5–14.5)
SODIUM SERPL-SCNC: 137 MMOL/L — SIGNIFICANT CHANGE UP (ref 135–146)
SPECIMEN SOURCE: SIGNIFICANT CHANGE UP
VANCOMYCIN TROUGH SERPL-MCNC: 31.7 UG/ML — HIGH (ref 5–10)
WBC # BLD: 9.97 K/UL — SIGNIFICANT CHANGE UP (ref 4.8–10.8)
WBC # FLD AUTO: 9.97 K/UL — SIGNIFICANT CHANGE UP (ref 4.8–10.8)

## 2025-03-14 PROCEDURE — 71045 X-RAY EXAM CHEST 1 VIEW: CPT | Mod: 26

## 2025-03-14 PROCEDURE — 93010 ELECTROCARDIOGRAM REPORT: CPT

## 2025-03-14 PROCEDURE — 99291 CRITICAL CARE FIRST HOUR: CPT

## 2025-03-14 RX ORDER — OXYCODONE HYDROCHLORIDE AND ACETAMINOPHEN 10; 325 MG/1; MG/1
1 TABLET ORAL
Qty: 30 | Refills: 0
Start: 2025-03-14

## 2025-03-14 RX ORDER — GABAPENTIN 400 MG/1
1 CAPSULE ORAL
Qty: 90 | Refills: 0
Start: 2025-03-14

## 2025-03-14 RX ORDER — OXYCODONE HYDROCHLORIDE 30 MG/1
5 TABLET ORAL ONCE
Refills: 0 | Status: DISCONTINUED | OUTPATIENT
Start: 2025-03-14 | End: 2025-03-14

## 2025-03-14 RX ORDER — TAMSULOSIN HYDROCHLORIDE 0.4 MG/1
1 CAPSULE ORAL
Qty: 30 | Refills: 0
Start: 2025-03-14

## 2025-03-14 RX ORDER — METOPROLOL SUCCINATE 50 MG/1
1 TABLET, EXTENDED RELEASE ORAL
Qty: 90 | Refills: 0
Start: 2025-03-14

## 2025-03-14 RX ORDER — AMOXICILLIN AND CLAVULANATE POTASSIUM 500; 125 MG/1; MG/1
1 TABLET, FILM COATED ORAL
Qty: 28 | Refills: 0
Start: 2025-03-14 | End: 2025-03-27

## 2025-03-14 RX ORDER — FUROSEMIDE 10 MG/ML
1 INJECTION INTRAMUSCULAR; INTRAVENOUS
Qty: 10 | Refills: 0
Start: 2025-03-14

## 2025-03-14 RX ORDER — DILTIAZEM HYDROCHLORIDE 240 MG/1
1 TABLET, EXTENDED RELEASE ORAL
Qty: 30 | Refills: 0
Start: 2025-03-14

## 2025-03-14 RX ORDER — ACETAMINOPHEN 500 MG/5ML
650 LIQUID (ML) ORAL EVERY 6 HOURS
Refills: 0 | Status: DISCONTINUED | OUTPATIENT
Start: 2025-03-14 | End: 2025-03-14

## 2025-03-14 RX ORDER — ACETAMINOPHEN 500 MG/5ML
2 LIQUID (ML) ORAL
Qty: 0 | Refills: 0 | DISCHARGE
Start: 2025-03-14

## 2025-03-14 RX ORDER — FOLIC ACID 1 MG/1
1 TABLET ORAL
Qty: 30 | Refills: 0
Start: 2025-03-14

## 2025-03-14 RX ORDER — ASPIRIN 325 MG
325 TABLET ORAL DAILY
Refills: 0 | Status: DISCONTINUED | OUTPATIENT
Start: 2025-03-14 | End: 2025-03-14

## 2025-03-14 RX ORDER — FERROUS SULFATE 137(45) MG
1 TABLET, EXTENDED RELEASE ORAL
Qty: 30 | Refills: 0
Start: 2025-03-14

## 2025-03-14 RX ORDER — ASPIRIN 325 MG
1 TABLET ORAL
Qty: 0 | Refills: 0 | DISCHARGE
Start: 2025-03-14

## 2025-03-14 RX ORDER — AMOXICILLIN AND CLAVULANATE POTASSIUM 500; 125 MG/1; MG/1
1 TABLET, FILM COATED ORAL EVERY 12 HOURS
Refills: 0 | Status: DISCONTINUED | OUTPATIENT
Start: 2025-03-14 | End: 2025-03-14

## 2025-03-14 RX ADMIN — Medication 650 MILLIGRAM(S): at 09:15

## 2025-03-14 RX ADMIN — Medication 325 MILLIGRAM(S): at 12:06

## 2025-03-14 RX ADMIN — INSULIN LISPRO 1: 100 INJECTION, SOLUTION INTRAVENOUS; SUBCUTANEOUS at 08:12

## 2025-03-14 RX ADMIN — METOPROLOL SUCCINATE 50 MILLIGRAM(S): 50 TABLET, EXTENDED RELEASE ORAL at 06:09

## 2025-03-14 RX ADMIN — HEPARIN SODIUM 5000 UNIT(S): 1000 INJECTION INTRAVENOUS; SUBCUTANEOUS at 14:41

## 2025-03-14 RX ADMIN — Medication 650 MILLIGRAM(S): at 08:15

## 2025-03-14 RX ADMIN — AMOXICILLIN AND CLAVULANATE POTASSIUM 1 TABLET(S): 500; 125 TABLET, FILM COATED ORAL at 17:03

## 2025-03-14 RX ADMIN — DILTIAZEM HYDROCHLORIDE 60 MILLIGRAM(S): 240 TABLET, EXTENDED RELEASE ORAL at 06:10

## 2025-03-14 RX ADMIN — Medication 40 MILLIEQUIVALENT(S): at 12:08

## 2025-03-14 RX ADMIN — METFORMIN HYDROCHLORIDE 500 MILLIGRAM(S): 850 TABLET ORAL at 17:03

## 2025-03-14 RX ADMIN — Medication 650 MILLIGRAM(S): at 17:02

## 2025-03-14 RX ADMIN — Medication 40 MILLIGRAM(S): at 08:11

## 2025-03-14 RX ADMIN — GABAPENTIN 300 MILLIGRAM(S): 400 CAPSULE ORAL at 06:10

## 2025-03-14 RX ADMIN — CEFEPIME 100 MILLIGRAM(S): 2 INJECTION, POWDER, FOR SOLUTION INTRAVENOUS at 06:09

## 2025-03-14 RX ADMIN — IRON SUCROSE 100 MILLIGRAM(S): 20 INJECTION, SOLUTION INTRAVENOUS at 12:07

## 2025-03-14 RX ADMIN — Medication 650 MILLIGRAM(S): at 18:02

## 2025-03-14 RX ADMIN — INSULIN LISPRO 2: 100 INJECTION, SOLUTION INTRAVENOUS; SUBCUTANEOUS at 17:03

## 2025-03-14 RX ADMIN — OXYCODONE HYDROCHLORIDE 5 MILLIGRAM(S): 30 TABLET ORAL at 15:20

## 2025-03-14 RX ADMIN — Medication 500 MILLIGRAM(S): at 12:06

## 2025-03-14 RX ADMIN — METFORMIN HYDROCHLORIDE 500 MILLIGRAM(S): 850 TABLET ORAL at 06:10

## 2025-03-14 RX ADMIN — DILTIAZEM HYDROCHLORIDE 60 MILLIGRAM(S): 240 TABLET, EXTENDED RELEASE ORAL at 14:41

## 2025-03-14 RX ADMIN — FUROSEMIDE 40 MILLIGRAM(S): 10 INJECTION INTRAMUSCULAR; INTRAVENOUS at 06:09

## 2025-03-14 RX ADMIN — METOPROLOL SUCCINATE 50 MILLIGRAM(S): 50 TABLET, EXTENDED RELEASE ORAL at 15:46

## 2025-03-14 RX ADMIN — OXYCODONE HYDROCHLORIDE 5 MILLIGRAM(S): 30 TABLET ORAL at 14:20

## 2025-03-14 RX ADMIN — AMOXICILLIN AND CLAVULANATE POTASSIUM 1 TABLET(S): 500; 125 TABLET, FILM COATED ORAL at 12:06

## 2025-03-14 RX ADMIN — FUROSEMIDE 40 MILLIGRAM(S): 10 INJECTION INTRAMUSCULAR; INTRAVENOUS at 17:03

## 2025-03-14 RX ADMIN — Medication 1 APPLICATION(S): at 06:10

## 2025-03-14 RX ADMIN — FOLIC ACID 1 MILLIGRAM(S): 1 TABLET ORAL at 12:07

## 2025-03-14 RX ADMIN — HEPARIN SODIUM 5000 UNIT(S): 1000 INJECTION INTRAVENOUS; SUBCUTANEOUS at 06:09

## 2025-03-14 RX ADMIN — INSULIN LISPRO 1: 100 INJECTION, SOLUTION INTRAVENOUS; SUBCUTANEOUS at 12:10

## 2025-03-14 RX ADMIN — GABAPENTIN 300 MILLIGRAM(S): 400 CAPSULE ORAL at 14:40

## 2025-03-14 NOTE — PROGRESS NOTE ADULT - PROVIDER SPECIALTY LIST ADULT
CT Surgery
Critical Care
Critical Care
CT Surgery
Critical Care
Critical Care
CT Surgery
Critical Care
Thoracic Surgery
Critical Care

## 2025-03-14 NOTE — PROGRESS NOTE ADULT - SUBJECTIVE AND OBJECTIVE BOX
CTU Attending Progress Daily Note       Procedure: CABG  POD#: 9    He has history of HLD (hyperlipidemia)    Hypertriglyceridemia    DM (diabetes mellitus)    CAD (coronary artery disease)    HTN (hypertension)      HPI:  Mr. BYRON NAJERA is a 54 year M with PMhx  includes DLD, HTN, CAD. Mr. NAJERA was worked up for complaints SOB on exertion. He  had a cardiac catheterization which revealed multivessel disease. On 03/05/25, he underwent surgical myocardial revascularization.    Hospital Course:  3/5 CABG 4 LIMA + Lt Radial  OR C2, Alb1, ,      on Levo and Vaso: Albumin 500 given  post-extubation hypercapnia on BIPAP  chest tube 300ml: Hb 7.7,              1 PRBCs 1 Platelets  3/6 Stable hemodynamics, off pressors, start BB  3/7 started diuresis  3/9 febrile, cultured, thoracentesis done 400cc removed  3/10 CT chest -> chest tube by thoracic surgery, lopressors / cardizem increased   3/11: 2 PRBC given  3/12: started vanco    OBJECTIVE:  ICU Vital Signs Last 24 Hrs  T(C): 36.8 (14 Mar 2025 12:00), Max: 37.1 (13 Mar 2025 16:00)  T(F): 98.2 (14 Mar 2025 12:00), Max: 98.7 (13 Mar 2025 16:00)  HR: 97 (14 Mar 2025 11:00) (82 - 111)  BP: 107/65 (14 Mar 2025 11:00) (86/53 - 125/67)  BP(mean): 79 (14 Mar 2025 11:00) (63 - 91)  ABP: --  ABP(mean): --  RR: 20 (14 Mar 2025 11:00) (13 - 28)  SpO2: 100% (14 Mar 2025 11:00) (93% - 100%)    O2 Parameters below as of 14 Mar 2025 12:00  Patient On (Oxygen Delivery Method): room air          I&O's Summary    13 Mar 2025 07:01  -  14 Mar 2025 07:00  --------------------------------------------------------  IN: 1510 mL / OUT: 2650 mL / NET: -1140 mL    14 Mar 2025 07:01  -  14 Mar 2025 11:29  --------------------------------------------------------  IN: 120 mL / OUT: 0 mL / NET: 120 mL      I&O's Detail    13 Mar 2025 07:01  -  14 Mar 2025 07:00  --------------------------------------------------------  IN:    IV PiggyBack: 850 mL    Oral Fluid: 660 mL  Total IN: 1510 mL    OUT:    Voided (mL): 2650 mL  Total OUT: 2650 mL    Total NET: -1140 mL      14 Mar 2025 07:01  -  14 Mar 2025 11:29  --------------------------------------------------------  IN:    Oral Fluid: 120 mL  Total IN: 120 mL    OUT:  Total OUT: 0 mL    Total NET: 120 mL        CAPILLARY BLOOD GLUCOSE      POCT Blood Glucose.: 195 mg/dL (14 Mar 2025 06:21)  POCT Blood Glucose.: 163 mg/dL (14 Mar 2025 06:19)  POCT Blood Glucose.: 260 mg/dL (13 Mar 2025 21:52)  POCT Blood Glucose.: 208 mg/dL (13 Mar 2025 15:56)    LABS:                          9.7    9.97  )-----------( 351      ( 14 Mar 2025 05:08 )             29.7     03-14    137  |  96[L]  |  15  ----------------------------<  153[H]  4.3   |  27  |  0.7    Ca    8.3[L]      14 Mar 2025 05:08  Mg     2.0     03-14    TPro  6.7  /  Alb  3.8  /  TBili  0.8  /  DBili  x   /  AST  14  /  ALT  23  /  AlkPhos  134[H]  03-14      Urinalysis Basic - ( 14 Mar 2025 05:08 )    Color: x / Appearance: x / SG: x / pH: x  Gluc: 153 mg/dL / Ketone: x  / Bili: x / Urobili: x   Blood: x / Protein: x / Nitrite: x   Leuk Esterase: x / RBC: x / WBC x   Sq Epi: x / Non Sq Epi: x / Bacteria: x        Culture - Sputum (collected 12 Mar 2025 12:30)  Source: Sputum Sputum  Gram Stain (13 Mar 2025 07:19):    Moderate polymorphonuclear leukocytes per low power field    Few Squamous epithelial cells per low power field    Few Gram Positive Rods seen per oil power field    Few Gram Positive Cocci in Pairs and Chains seen per oil power field    Rare Gram Negative Rods seen per oil power field    Rare Gram Positive Cocci in Clusters seen per oil power field  Preliminary Report (13 Mar 2025 17:28):    Commensal zackary consistent with body site      Home Medications:  aspirin 81 mg oral delayed release capsule: orally once a day (05 Mar 2025 06:53)  atorvastatin 80 mg oral tablet: 1 tab(s) orally once a day (05 Mar 2025 06:53)  icosapent 1 g oral capsule: 2 cap(s) orally once a day (05 Mar 2025 06:53)  losartan 25 mg oral tablet: 1 tab(s) orally once a day (05 Mar 2025 06:53)  metFORMIN 500 mg oral tablet: 1 tab(s) orally 2 times a day (05 Mar 2025 06:53)  metoprolol succinate 25 mg oral tablet, extended release: 1 tab(s) orally once a day (05 Mar 2025 06:53)  Ozempic 2 mg/1.5 mL (1 mg dose) subcutaneous solution: subcutaneous once a week (05 Mar 2025 06:53)    HOSPITAL MEDICATIONS:  MEDICATIONS  (STANDING):  acetylcysteine 10%  Inhalation 4 milliLiter(s) Inhalation every 6 hours  amoxicillin  875 milliGRAM(s)/clavulanate 1 Tablet(s) Oral every 12 hours  ascorbic acid 500 milliGRAM(s) Oral daily  aspirin enteric coated 325 milliGRAM(s) Oral daily  atorvastatin 80 milliGRAM(s) Oral at bedtime  bisacodyl Suppository 10 milliGRAM(s) Rectal once  chlorhexidine 2% Cloths 1 Application(s) Topical daily  dextrose 5%. 1000 milliLiter(s) (50 mL/Hr) IV Continuous <Continuous>  dextrose 5%. 1000 milliLiter(s) (100 mL/Hr) IV Continuous <Continuous>  dextrose 50% Injectable 50 milliLiter(s) IV Push every 15 minutes  dextrose 50% Injectable 25 milliLiter(s) IV Push every 15 minutes  diltiazem    Tablet 60 milliGRAM(s) Oral every 8 hours  folic acid 1 milliGRAM(s) Oral daily  furosemide   Injectable 40 milliGRAM(s) IV Push every 12 hours  gabapentin 300 milliGRAM(s) Oral three times a day  glucagon  Injectable 1 milliGRAM(s) IntraMuscular once  heparin   Injectable 5000 Unit(s) SubCutaneous every 8 hours  influenza   Vaccine 0.5 milliLiter(s) IntraMuscular once  insulin lispro (ADMELOG) corrective regimen sliding scale   SubCutaneous three times a day before meals  insulin lispro (ADMELOG) corrective regimen sliding scale   SubCutaneous at bedtime  ipratropium    for Nebulization 500 MICROGram(s) Nebulizer every 6 hours  iron sucrose IVPB 200 milliGRAM(s) IV Intermittent every 24 hours  melatonin 5 milliGRAM(s) Oral at bedtime  metFORMIN 500 milliGRAM(s) Oral every 12 hours  metoprolol tartrate 50 milliGRAM(s) Oral every 8 hours  pantoprazole    Tablet 40 milliGRAM(s) Oral before breakfast  polyethylene glycol 3350 17 Gram(s) Oral daily  potassium chloride    Tablet ER 40 milliEquivalent(s) Oral daily  senna 2 Tablet(s) Oral at bedtime  tamsulosin 0.4 milliGRAM(s) Oral at bedtime    MEDICATIONS  (PRN):  acetaminophen     Tablet .. 650 milliGRAM(s) Oral every 6 hours PRN Mild Pain (1 - 3)  dextrose Oral Gel 15 Gram(s) Oral once PRN Blood Glucose LESS THAN 70 milliGRAM(s)/deciliter  melatonin 5 milliGRAM(s) Oral at bedtime PRN Insomnia  ondansetron Injectable 4 milliGRAM(s) IV Push every 4 hours PRN Nausea and/or Vomiting    RADIOLOGY:  Chest X-ray Reviewed    REVIEW OF SYSTEMS:  CONSTITUTIONAL: [X] all negative; [ ] weakness, [ ] fevers, [ ] chills  EYES/ENT: [X] all negative; [ ] visual changes, [ ] vertigo, [ ] throat pain   NECK: [X] all negative; [ ] pain, [ ] stiffness  RESPIRATORY: [] all negative, [ ] cough, [ ] wheezing, [ ] hemoptysis, [ ] shortness of breath  CARDIOVASCULAR: [] all negative; [ ] chest pain, [ ] palpitations, [ ] orthopnea  GASTROINTESTINAL: [X] all negative; [ ]abdominal pain, [ ] nausea, [ ] vomiting, [ ] hematemesis, [ ] diarrhea, [ ] constipation, [ ] melena, [ ] hematochezia.  GENITOURINARY: [X] all negative; [ ] dysuria, [ ] frequency, [ ] hematuria  NEUROLOGICAL: [X] all negative; [ ] numbness, [ ] weakness  SKIN: [X] all negative; [ ] itching, [ ] burning, [ ] rashes, [ ] lesions   All other review of systems is negative unless indicated above.  [  ] Unable to assess ROS because   RADIOLOGY:  Chest X-ray Reviewed    REVIEW OF SYSTEMS:  CONSTITUTIONAL: [X] all negative; [ ] weakness, [ ] fevers, [ ] chills  EYES/ENT: [X] all negative; [ ] visual changes, [ ] vertigo, [ ] throat pain   NECK: [X] all negative; [ ] pain, [ ] stiffness  RESPIRATORY: [] all negative, [ ] cough, [ ] wheezing, [ ] hemoptysis, [ ] shortness of breath  CARDIOVASCULAR: [] all negative; [ ] chest pain, [ ] palpitations, [ ] orthopnea  GASTROINTESTINAL: [X] all negative; [ ]abdominal pain, [ ] nausea, [ ] vomiting, [ ] hematemesis, [ ] diarrhea, [ ] constipation, [ ] melena, [ ] hematochezia.  GENITOURINARY: [X] all negative; [ ] dysuria, [ ] frequency, [ ] hematuria  NEUROLOGICAL: [X] all negative; [ ] numbness, [ ] weakness  SKIN: [X] all negative; [ ] itching, [ ] burning, [ ] rashes, [ ] lesions   All other review of systems is negative unless indicated above.  [  ] Unable to assess ROS because     PHYSICAL EXAM:          CONSTITUTIONAL: Well-developed; well-nourished; in no acute distress.   	SKIN: warm, dry  	HEAD: Normocephalic; atraumatic.  	EYES: PERRL, EOM, no conj injection, sclera clear  	ENT: No nasal discharge; airway clear.  	NECK: Supple; non tender.   	CARD: S1, S2 normal; no murmurs, gallops, or rubs. Regular rate and rhythm.  no carotid bruits  	RESP: CTA B/L; good air movement No wheezes, rales or rhonchi.  	ABD: Soft, not tender, not distended,  no rebound or guarding, bowel sounds present  	EXT: Normal ROM.  No clubbing, cyanosis or edema.   warm and well perfused.  pulses palpable  	NEURO: Alert, awake, motor 5/5 R, 5/5 L

## 2025-03-14 NOTE — DISCHARGE NOTE NURSING/CASE MANAGEMENT/SOCIAL WORK - PATIENT PORTAL LINK FT
You can access the FollowMyHealth Patient Portal offered by [facility] by registering at the following website: [email or web address]. By joining [employer]’s FollowMyHealth portal, you will also be able to view your health information using other applications (apps) compatible with our system.

## 2025-03-14 NOTE — DISCHARGE NOTE NURSING/CASE MANAGEMENT/SOCIAL WORK - FINANCIAL ASSISTANCE
[facility] provides services at a reduced cost to those who are determined to be eligible through [facility]’s financial assistance program. Information regarding [facility]’s financial assistance program can be found by going to [email or web address] or by calling [phone].

## 2025-03-14 NOTE — PROGRESS NOTE ADULT - ASSESSMENT
Assessment   s/p CABG POD 9      Plan:    Neuro:  Multimodal pain management  Tylenol, Lidoderm patch, gabapentin, opiates as needed  Monitor neuro status in the post op period    CV:  S/P CABG  ASA bet blockers  Statein  HTN / tachy  Lopressor 50Q8  Cardizem 60Q8 change to 120 cd on discharge  Monitor perfusion, , lactate, UOP, index and MVO2   Maintain MAP > 65  Continue diuresis    Resp:  L lung atelactasis - CT chest with loculated effusion, atelactasis  S/p chest tube by thoracic surgery - not draining - removed  Supplemental oxygen to maintain sats > 92%  Continuous pulse oximetry monitoring  IS / Chest PT  Nebulizers as needed  Flutter valve  Nebulizers  IPV    GI:  Advance diet - cardiac  Bowel Regimen - escalate as needed  PUD ppx per protocol    Renal  High risk for TERENCE post surgery  Monitor UOP, lytes, creatinine  lasix today - goal negative 1L   Add metolazone 2.5  Keep K > 4, Mg > 2    Endo:  Tight glucose control per CTICU protocol  SSI    Heme:  Acute blood loss anemia post surgery  IV iron / FA / MVI  Hgb low s/p 2 PRBC  DVT ppx    ID:  Cefepime for possible PNA  Add vanco  F/up cultures - NGTD  MRSA negative  UA negative  Monitor fever curve and WBC count    change atb to augmentin po on dischrge  today    Upon my evaluation, the above patient has a high probability of imminent or life threatening deterioration due to a high risk for Shock, Cardiogenic shock, arrythmia, acute blood loss, acute kidney injury and acute pulmonary insufficiency which required my direct attention, intervention, and personal management.  Total critical care time indicated in the note includes review of radiology results, ordering, interpreting and reviewing diagnostic studies / lab tests with immediate assessment and treatment, consultant collaboration on findings and treatment options, monitoring for potential decompensation, obtaining history from family, EMT, nursing home staff and/or treating physicians; directing the titration of pressors, oxygen support devices, fluid resuscitation, interpretation of cardiac output measurements, interpretation of radiological assessments, interpretation of EKG / rhythm strips, telemetry.  This time did not overlap with the management of other patients or performing separately reportable procedures.      Management of this patient was discussed with the CT Surgery/Thoracic surgery/Structural Cardiology attending and mid-level providers.    I acknowledge the use of copied documentation.  All copy forward documentation is my own and has been reviewed and revised as appropriate.  If no changes were made, it is because that information remains the same.

## 2025-03-14 NOTE — PROGRESS NOTE ADULT - SUBJECTIVE AND OBJECTIVE BOX
OPERATIVE PROCEDURE(s): CABG x 4, left radial               POD # 9                       SURGEON(s): YARED Babcock      SUBJECTIVE ASSESSMENT:54yMale patient seen and examined at bedside. No complaints at this time.     Vital Signs Last 24 Hrs  T(F): 98 (14 Mar 2025 08:00), Max: 98.7 (13 Mar 2025 16:00)  HR: 98 (14 Mar 2025 09:00) (82 - 111)  BP: 107/66 (14 Mar 2025 09:00) (86/53 - 125/67)  BP(mean): 81 (14 Mar 2025 09:00) (63 - 91)  RR: 22 (14 Mar 2025 09:00) (13 - 28)  SpO2: 100% (14 Mar 2025 09:00) (93% - 100%)      I&O's Detail    13 Mar 2025 07:01  -  14 Mar 2025 07:00  --------------------------------------------------------  IN:    IV PiggyBack: 850 mL    Oral Fluid: 660 mL  Total IN: 1510 mL    OUT:    Voided (mL): 2650 mL  Total OUT: 2650 mL        Net: I&O's Detail    12 Mar 2025 07:01  -  13 Mar 2025 07:00  --------------------------------------------------------  Total NET: -4320 mL      13 Mar 2025 07:01  -  14 Mar 2025 07:00  --------------------------------------------------------  Total NET: -1140 mL        CAPILLARY BLOOD GLUCOSE      POCT Blood Glucose.: 195 mg/dL (14 Mar 2025 06:21)  POCT Blood Glucose.: 163 mg/dL (14 Mar 2025 06:19)  POCT Blood Glucose.: 260 mg/dL (13 Mar 2025 21:52)  POCT Blood Glucose.: 208 mg/dL (13 Mar 2025 15:56)  POCT Blood Glucose.: 331 mg/dL (13 Mar 2025 11:07)    A1C with Estimated Average Glucose Result: 6.6 % (02-28-25 @ 07:39)      Physical Exam:  General: NAD; A&Ox3  Neuro: pupils equal and reactive, speech clear, no overt sensory or motor deficts  Cardiac: S1/S2, RRR, no murmur, no rubs  Lungs: unlabored shallow respirations, bilateral bases decreased  Abdomen: Soft/NT/ND; positive bowel sounds x 4  Sternum: Intact, no click, incision healing well with no drainage  Incisions: Incisions clean/dry/intact  Extremities: Mild edema b/l lower extremities; good capillary refill; no cyanosis; palpable 1+ pedal pulses b/l      BOWEL MOVEMENT:  [X] YES [] NO, If No, Timing since last BM Day #          LABS:                        9.7[L]  9.97  )-----------( 351      ( 14 Mar 2025 05:08 )             29.7[L]                        9.3[L]  8.65  )-----------( 304      ( 13 Mar 2025 02:15 )             27.4[L]    03-14    137  |  96[L]  |  15  ----------------------------<  153[H]  4.3   |  27  |  0.7  03-13    136  |  94[L]  |  11  ----------------------------<  178[H]  4.4   |  29  |  0.7    Ca    8.3[L]      14 Mar 2025 05:08  Mg     2.0     03-14    TPro  6.7 [6.0 - 8.0]  /  Alb  3.8 [3.5 - 5.2]  /  TBili  0.8 [0.2 - 1.2]  /  DBili  x   /  AST  14 [0 - 41]  /  ALT  23 [0 - 41]  /  AlkPhos  134[H] [30 - 115]  03-14        RADIOLOGY & ADDITIONAL TESTS:  CXR:  < from: Xray Chest 1 View- PORTABLE-Routine (Xray Chest 1 View- PORTABLE-Routine in AM.) (03.14.25 @ 06:04) >    INTERPRETATION:  CLINICAL HISTORY: Follow-up.    COMPARISON: March 13, 2025.    TECHNIQUE: Portable frontal chest radiograph. Low lung volume.    FINDINGS:    Support devices: None.    Cardiac/mediastinum/hilum: Stable.    Lung parenchyma/Pleura: Left basilar opacity, unchanged. No pneumothorax   is seen.    Skeleton/soft tissues: Stable.      IMPRESSION: Low lung volume.    Left basilar opacity, unchanged.         EKG:  < from: 12 Lead ECG (03.13.25 @ 07:24) >  Ventricular Rate 100 BPM    Atrial Rate 100 BPM    P-R Interval 138 ms    QRS Duration 80 ms    Q-T Interval 344 ms    QTC Calculation(Bazett) 443 ms    P Axis 31 degrees    R Axis -20 degrees    T Axis 32 degrees    Diagnosis Line Normal sinus rhythm  Inferior infarct , age undetermined  Possible Anterior infarct , age undetermined  Abnormal ECG        Allergies    No Known Allergies    Intolerances      MEDICATIONS  (STANDING):  acetylcysteine 10%  Inhalation 4 milliLiter(s) Inhalation every 6 hours  ascorbic acid 500 milliGRAM(s) Oral daily  aspirin enteric coated 81 milliGRAM(s) Oral daily  atorvastatin 80 milliGRAM(s) Oral at bedtime  bisacodyl Suppository 10 milliGRAM(s) Rectal once  cefepime   IVPB      cefepime   IVPB 1000 milliGRAM(s) IV Intermittent every 12 hours  chlorhexidine 2% Cloths 1 Application(s) Topical daily  dextrose 5%. 1000 milliLiter(s) (50 mL/Hr) IV Continuous <Continuous>  dextrose 5%. 1000 milliLiter(s) (100 mL/Hr) IV Continuous <Continuous>  dextrose 50% Injectable 50 milliLiter(s) IV Push every 15 minutes  dextrose 50% Injectable 25 milliLiter(s) IV Push every 15 minutes  diltiazem    Tablet 60 milliGRAM(s) Oral every 8 hours  folic acid 1 milliGRAM(s) Oral daily  furosemide   Injectable 40 milliGRAM(s) IV Push every 12 hours  gabapentin 300 milliGRAM(s) Oral three times a day  glucagon  Injectable 1 milliGRAM(s) IntraMuscular once  heparin   Injectable 5000 Unit(s) SubCutaneous every 8 hours  influenza   Vaccine 0.5 milliLiter(s) IntraMuscular once  insulin lispro (ADMELOG) corrective regimen sliding scale   SubCutaneous three times a day before meals  insulin lispro (ADMELOG) corrective regimen sliding scale   SubCutaneous at bedtime  ipratropium    for Nebulization 500 MICROGram(s) Nebulizer every 6 hours  iron sucrose IVPB 200 milliGRAM(s) IV Intermittent every 24 hours  melatonin 5 milliGRAM(s) Oral at bedtime  metFORMIN 500 milliGRAM(s) Oral every 12 hours  metoprolol tartrate 50 milliGRAM(s) Oral every 8 hours  pantoprazole    Tablet 40 milliGRAM(s) Oral before breakfast  polyethylene glycol 3350 17 Gram(s) Oral daily  potassium chloride    Tablet ER 40 milliEquivalent(s) Oral daily  senna 2 Tablet(s) Oral at bedtime  tamsulosin 0.4 milliGRAM(s) Oral at bedtime  vancomycin  IVPB 1250 milliGRAM(s) IV Intermittent every 12 hours    MEDICATIONS  (PRN):  dextrose Oral Gel 15 Gram(s) Oral once PRN Blood Glucose LESS THAN 70 milliGRAM(s)/deciliter  melatonin 5 milliGRAM(s) Oral at bedtime PRN Insomnia  ondansetron Injectable 4 milliGRAM(s) IV Push every 4 hours PRN Nausea and/or Vomiting    Home Medications:  aspirin 81 mg oral delayed release capsule: orally once a day (05 Mar 2025 06:53)  atorvastatin 80 mg oral tablet: 1 tab(s) orally once a day (05 Mar 2025 06:53)  icosapent 1 g oral capsule: 2 cap(s) orally once a day (05 Mar 2025 06:53)  losartan 25 mg oral tablet: 1 tab(s) orally once a day (05 Mar 2025 06:53)  metFORMIN 500 mg oral tablet: 1 tab(s) orally 2 times a day (05 Mar 2025 06:53)  metoprolol succinate 25 mg oral tablet, extended release: 1 tab(s) orally once a day (05 Mar 2025 06:53)  Ozempic 2 mg/1.5 mL (1 mg dose) subcutaneous solution: subcutaneous once a week (05 Mar 2025 06:53)      Pharmacologic DVT Prophylaxis [X] YES, [] NO: Contraindication:  SCD's: YES b/l    GI Prophylaxis: protonix    Post-Op Beta-Blockers: [X] Yes, [] No: contraindication:  Post-Op Aspirin:  [X] Yes, [] No: contraindication:  Post-Op Statin:  [X] Yes, [] No: contraindication:    Ambulation/Activity Status: ambulate as tolerated    Assessment/Plan:  54y Male status-post CABG x3      POD #9  - Case and plan discussed with CTU Intensivist and CT Surgeon - Dr. Babcock  - Continue CTU supportive care and ongoing plan of care as per continuing CTU rounds.   - Continue DVT/GI prophylaxis  - Incentive Spirometry 10 times an hour  - Continue to advance physical activity as tolerated and continue PT/OT as directed  1. CAD s/p CABG: Continue ASA, statin, BB. Continue diuresis with Lasix 20mg qd.   2. HTN: Continue metoprolol 25mg bid. cardizem 60mg q8h   3. Post-op A. Fib ppx: monitor electrolytes   4. COPD/Hypoxia/Atelectasis on CXR: CXR improving, on room air, continue nebs, encourage cough and deep breathing  5. DM/Glucose Control (A1c 6.6): metformin PO started, patient to restart home ozempic outpatient.   6. Anemia 2/2 acute blood loss, pt asx: monitor H/H, active T&S, continue folic acid and IV iron    - No fevers overnight, WBC stable, plan to go home with Augmentin PO x 7 days      Social Service Disposition: anticipate d/c home today   OPERATIVE PROCEDURE(s): CABG x 4, left radial               POD # 9                       SURGEON(s): YARED Babcock      SUBJECTIVE ASSESSMENT:54yMale patient seen and examined at bedside. No complaints at this time.     Vital Signs Last 24 Hrs  T(F): 98 (14 Mar 2025 08:00), Max: 98.7 (13 Mar 2025 16:00)  HR: 98 (14 Mar 2025 09:00) (82 - 111)  BP: 107/66 (14 Mar 2025 09:00) (86/53 - 125/67)  BP(mean): 81 (14 Mar 2025 09:00) (63 - 91)  RR: 22 (14 Mar 2025 09:00) (13 - 28)  SpO2: 100% (14 Mar 2025 09:00) (93% - 100%)      I&O's Detail    13 Mar 2025 07:01  -  14 Mar 2025 07:00  --------------------------------------------------------  IN:    IV PiggyBack: 850 mL    Oral Fluid: 660 mL  Total IN: 1510 mL    OUT:    Voided (mL): 2650 mL  Total OUT: 2650 mL        Net: I&O's Detail    12 Mar 2025 07:01  -  13 Mar 2025 07:00  --------------------------------------------------------  Total NET: -4320 mL      13 Mar 2025 07:01  -  14 Mar 2025 07:00  --------------------------------------------------------  Total NET: -1140 mL        CAPILLARY BLOOD GLUCOSE      POCT Blood Glucose.: 195 mg/dL (14 Mar 2025 06:21)  POCT Blood Glucose.: 163 mg/dL (14 Mar 2025 06:19)  POCT Blood Glucose.: 260 mg/dL (13 Mar 2025 21:52)  POCT Blood Glucose.: 208 mg/dL (13 Mar 2025 15:56)  POCT Blood Glucose.: 331 mg/dL (13 Mar 2025 11:07)    A1C with Estimated Average Glucose Result: 6.6 % (02-28-25 @ 07:39)      Physical Exam:  General: NAD; A&Ox3  Neuro: pupils equal and reactive, speech clear, no overt sensory or motor deficts  Cardiac: S1/S2, RRR, no murmur, no rubs  Lungs: unlabored shallow respirations, bilateral bases decreased  Abdomen: Soft/NT/ND; positive bowel sounds x 4  Sternum: Intact, no click, incision healing well with trace sero-sanguinous drainage from proximal sternum  Incisions: Incisions clean/dry/intact  Extremities: Mild edema b/l lower extremities; good capillary refill; no cyanosis; palpable 1+ pedal pulses b/l      BOWEL MOVEMENT:  [X] YES [] NO, If No, Timing since last BM Day #          LABS:                        9.7[L]  9.97  )-----------( 351      ( 14 Mar 2025 05:08 )             29.7[L]                        9.3[L]  8.65  )-----------( 304      ( 13 Mar 2025 02:15 )             27.4[L]    03-14    137  |  96[L]  |  15  ----------------------------<  153[H]  4.3   |  27  |  0.7  03-13    136  |  94[L]  |  11  ----------------------------<  178[H]  4.4   |  29  |  0.7    Ca    8.3[L]      14 Mar 2025 05:08  Mg     2.0     03-14    TPro  6.7 [6.0 - 8.0]  /  Alb  3.8 [3.5 - 5.2]  /  TBili  0.8 [0.2 - 1.2]  /  DBili  x   /  AST  14 [0 - 41]  /  ALT  23 [0 - 41]  /  AlkPhos  134[H] [30 - 115]  03-14        RADIOLOGY & ADDITIONAL TESTS:  CXR:  < from: Xray Chest 1 View- PORTABLE-Routine (Xray Chest 1 View- PORTABLE-Routine in AM.) (03.14.25 @ 06:04) >    INTERPRETATION:  CLINICAL HISTORY: Follow-up.    COMPARISON: March 13, 2025.    TECHNIQUE: Portable frontal chest radiograph. Low lung volume.    FINDINGS:    Support devices: None.    Cardiac/mediastinum/hilum: Stable.    Lung parenchyma/Pleura: Left basilar opacity, unchanged. No pneumothorax   is seen.    Skeleton/soft tissues: Stable.      IMPRESSION: Low lung volume.    Left basilar opacity, unchanged.         EKG:  < from: 12 Lead ECG (03.13.25 @ 07:24) >  Ventricular Rate 100 BPM    Atrial Rate 100 BPM    P-R Interval 138 ms    QRS Duration 80 ms    Q-T Interval 344 ms    QTC Calculation(Bazett) 443 ms    P Axis 31 degrees    R Axis -20 degrees    T Axis 32 degrees    Diagnosis Line Normal sinus rhythm  Inferior infarct , age undetermined  Possible Anterior infarct , age undetermined  Abnormal ECG        Allergies    No Known Allergies    Intolerances      MEDICATIONS  (STANDING):  acetylcysteine 10%  Inhalation 4 milliLiter(s) Inhalation every 6 hours  ascorbic acid 500 milliGRAM(s) Oral daily  aspirin enteric coated 81 milliGRAM(s) Oral daily  atorvastatin 80 milliGRAM(s) Oral at bedtime  bisacodyl Suppository 10 milliGRAM(s) Rectal once  cefepime   IVPB      cefepime   IVPB 1000 milliGRAM(s) IV Intermittent every 12 hours  chlorhexidine 2% Cloths 1 Application(s) Topical daily  dextrose 5%. 1000 milliLiter(s) (50 mL/Hr) IV Continuous <Continuous>  dextrose 5%. 1000 milliLiter(s) (100 mL/Hr) IV Continuous <Continuous>  dextrose 50% Injectable 50 milliLiter(s) IV Push every 15 minutes  dextrose 50% Injectable 25 milliLiter(s) IV Push every 15 minutes  diltiazem    Tablet 60 milliGRAM(s) Oral every 8 hours  folic acid 1 milliGRAM(s) Oral daily  furosemide   Injectable 40 milliGRAM(s) IV Push every 12 hours  gabapentin 300 milliGRAM(s) Oral three times a day  glucagon  Injectable 1 milliGRAM(s) IntraMuscular once  heparin   Injectable 5000 Unit(s) SubCutaneous every 8 hours  influenza   Vaccine 0.5 milliLiter(s) IntraMuscular once  insulin lispro (ADMELOG) corrective regimen sliding scale   SubCutaneous three times a day before meals  insulin lispro (ADMELOG) corrective regimen sliding scale   SubCutaneous at bedtime  ipratropium    for Nebulization 500 MICROGram(s) Nebulizer every 6 hours  iron sucrose IVPB 200 milliGRAM(s) IV Intermittent every 24 hours  melatonin 5 milliGRAM(s) Oral at bedtime  metFORMIN 500 milliGRAM(s) Oral every 12 hours  metoprolol tartrate 50 milliGRAM(s) Oral every 8 hours  pantoprazole    Tablet 40 milliGRAM(s) Oral before breakfast  polyethylene glycol 3350 17 Gram(s) Oral daily  potassium chloride    Tablet ER 40 milliEquivalent(s) Oral daily  senna 2 Tablet(s) Oral at bedtime  tamsulosin 0.4 milliGRAM(s) Oral at bedtime  vancomycin  IVPB 1250 milliGRAM(s) IV Intermittent every 12 hours    MEDICATIONS  (PRN):  dextrose Oral Gel 15 Gram(s) Oral once PRN Blood Glucose LESS THAN 70 milliGRAM(s)/deciliter  melatonin 5 milliGRAM(s) Oral at bedtime PRN Insomnia  ondansetron Injectable 4 milliGRAM(s) IV Push every 4 hours PRN Nausea and/or Vomiting    Home Medications:  aspirin 81 mg oral delayed release capsule: orally once a day (05 Mar 2025 06:53)  atorvastatin 80 mg oral tablet: 1 tab(s) orally once a day (05 Mar 2025 06:53)  icosapent 1 g oral capsule: 2 cap(s) orally once a day (05 Mar 2025 06:53)  losartan 25 mg oral tablet: 1 tab(s) orally once a day (05 Mar 2025 06:53)  metFORMIN 500 mg oral tablet: 1 tab(s) orally 2 times a day (05 Mar 2025 06:53)  metoprolol succinate 25 mg oral tablet, extended release: 1 tab(s) orally once a day (05 Mar 2025 06:53)  Ozempic 2 mg/1.5 mL (1 mg dose) subcutaneous solution: subcutaneous once a week (05 Mar 2025 06:53)      Pharmacologic DVT Prophylaxis [X] YES, [] NO: Contraindication:  SCD's: YES b/l    GI Prophylaxis: protonix    Post-Op Beta-Blockers: [X] Yes, [] No: contraindication:  Post-Op Aspirin:  [X] Yes, [] No: contraindication:  Post-Op Statin:  [X] Yes, [] No: contraindication:    Ambulation/Activity Status: ambulate as tolerated    Assessment/Plan:  54y Male status-post CABG x3      POD #9  - Case and plan discussed with CTU Intensivist and CT Surgeon - Dr. Babcock  - Continue CTU supportive care and ongoing plan of care as per continuing CTU rounds.   - Continue DVT/GI prophylaxis  - Incentive Spirometry 10 times an hour  - Continue to advance physical activity as tolerated and continue PT/OT as directed  1. CAD s/p CABG: Continue ASA, statin, BB. Continue diuresis with Lasix 20mg qd.   2. HTN: Continue metoprolol 25mg bid. cardizem 60mg q8h   3. Post-op A. Fib ppx: monitor electrolytes   4. COPD/Hypoxia/Atelectasis on CXR: CXR improving, on room air, continue nebs, encourage cough and deep breathing  5. DM/Glucose Control (A1c 6.6): metformin PO started, patient to restart home ozempic outpatient.   6. Anemia 2/2 acute blood loss, pt asx: monitor H/H, active T&S, continue folic acid and IV iron    - No fevers overnight, WBC stable, plan to go home with Augmentin PO x 7 days      Social Service Disposition: anticipate d/c home today

## 2025-03-14 NOTE — DISCHARGE NOTE NURSING/CASE MANAGEMENT/SOCIAL WORK - [id number]_GEN_ALL_CORE
For information on Fall & Injury Prevention, visit: [email or web address] OR  [email or web address] OR  https://www.cdc.gov/steadi/patient.html

## 2025-03-15 ENCOUNTER — TRANSCRIPTION ENCOUNTER (OUTPATIENT)
Age: 55
End: 2025-03-15

## 2025-03-15 LAB
CULTURE RESULTS: SIGNIFICANT CHANGE UP
CULTURE RESULTS: SIGNIFICANT CHANGE UP
SPECIMEN SOURCE: SIGNIFICANT CHANGE UP
SPECIMEN SOURCE: SIGNIFICANT CHANGE UP

## 2025-03-17 ENCOUNTER — APPOINTMENT (OUTPATIENT)
Dept: CARE COORDINATION | Facility: HOME HEALTH | Age: 55
End: 2025-03-17
Payer: COMMERCIAL

## 2025-03-17 VITALS
RESPIRATION RATE: 14 BRPM | DIASTOLIC BLOOD PRESSURE: 68 MMHG | HEART RATE: 90 BPM | SYSTOLIC BLOOD PRESSURE: 122 MMHG | OXYGEN SATURATION: 97 %

## 2025-03-17 PROCEDURE — 99024 POSTOP FOLLOW-UP VISIT: CPT

## 2025-03-17 RX ORDER — AMOXICILLIN AND CLAVULANATE POTASSIUM 875; 125 MG/1; MG/1
875-125 TABLET, COATED ORAL
Refills: 0 | Status: ACTIVE | COMMUNITY
Start: 2025-03-17

## 2025-03-17 RX ORDER — OXYCODONE HYDROCHLORIDE AND ACETAMINOPHEN 5; 325 MG/1; MG/1
5-325 TABLET ORAL
Refills: 0 | Status: ACTIVE | COMMUNITY
Start: 2025-03-17

## 2025-03-17 RX ORDER — PANTOPRAZOLE 40 MG/1
40 TABLET, DELAYED RELEASE ORAL
Refills: 0 | Status: ACTIVE | COMMUNITY
Start: 2025-03-17

## 2025-03-17 RX ORDER — ASPIRIN 325 MG/1
325 TABLET, FILM COATED ORAL
Refills: 0 | Status: ACTIVE | COMMUNITY
Start: 2025-03-17

## 2025-03-17 RX ORDER — DILTIAZEM HYDROCHLORIDE 120 MG/1
120 CAPSULE, COATED, EXTENDED RELEASE ORAL
Refills: 0 | Status: ACTIVE | COMMUNITY
Start: 2025-03-17

## 2025-03-17 RX ORDER — CHLORHEXIDINE GLUCONATE 4 %
325 (65 FE) LIQUID (ML) TOPICAL
Refills: 0 | Status: ACTIVE | COMMUNITY
Start: 2025-03-17

## 2025-03-17 RX ORDER — POTASSIUM CHLORIDE 1500 MG/1
20 TABLET, EXTENDED RELEASE ORAL
Refills: 0 | Status: ACTIVE | COMMUNITY
Start: 2025-03-17

## 2025-03-17 RX ORDER — GABAPENTIN 300 MG/1
300 CAPSULE ORAL
Refills: 0 | Status: ACTIVE | COMMUNITY
Start: 2025-03-17

## 2025-03-17 RX ORDER — FUROSEMIDE 40 MG/1
40 TABLET ORAL
Refills: 0 | Status: ACTIVE | COMMUNITY
Start: 2025-03-17

## 2025-03-17 RX ORDER — TAMSULOSIN HYDROCHLORIDE 0.4 MG/1
0.4 CAPSULE ORAL
Refills: 0 | Status: ACTIVE | COMMUNITY
Start: 2025-03-17

## 2025-03-20 ENCOUNTER — RESULT REVIEW (OUTPATIENT)
Age: 55
End: 2025-03-20

## 2025-03-20 ENCOUNTER — OUTPATIENT (OUTPATIENT)
Dept: OUTPATIENT SERVICES | Facility: HOSPITAL | Age: 55
LOS: 1 days | End: 2025-03-20
Payer: COMMERCIAL

## 2025-03-20 ENCOUNTER — APPOINTMENT (OUTPATIENT)
Dept: CARDIOTHORACIC SURGERY | Facility: CLINIC | Age: 55
End: 2025-03-20

## 2025-03-20 VITALS
OXYGEN SATURATION: 98 % | TEMPERATURE: 98.6 F | BODY MASS INDEX: 24.62 KG/M2 | HEART RATE: 94 BPM | RESPIRATION RATE: 16 BRPM | DIASTOLIC BLOOD PRESSURE: 75 MMHG | HEIGHT: 70 IN | WEIGHT: 172 LBS | SYSTOLIC BLOOD PRESSURE: 110 MMHG

## 2025-03-20 DIAGNOSIS — I80.8 PHLEBITIS AND THROMBOPHLEBITIS OF OTHER SITES: ICD-10-CM

## 2025-03-20 DIAGNOSIS — R06.02 SHORTNESS OF BREATH: ICD-10-CM

## 2025-03-20 PROBLEM — Z95.1 S/P CABG (CORONARY ARTERY BYPASS GRAFT): Status: ACTIVE | Noted: 2025-03-17

## 2025-03-20 PROCEDURE — 71046 X-RAY EXAM CHEST 2 VIEWS: CPT

## 2025-03-20 PROCEDURE — 99024 POSTOP FOLLOW-UP VISIT: CPT

## 2025-03-20 PROCEDURE — 71046 X-RAY EXAM CHEST 2 VIEWS: CPT | Mod: 26

## 2025-03-20 RX ORDER — ASPIRIN 325 MG/1
325 TABLET ORAL DAILY
Refills: 0 | Status: ACTIVE | COMMUNITY

## 2025-03-20 RX ORDER — TAMSULOSIN HCL 0.4 MG
0.4 CAPSULE ORAL
Refills: 0 | Status: ACTIVE | COMMUNITY

## 2025-03-20 RX ORDER — SEMAGLUTIDE 1.34 MG/ML
2 INJECTION, SOLUTION SUBCUTANEOUS WEEKLY
Refills: 0 | Status: ACTIVE | COMMUNITY

## 2025-03-20 RX ORDER — CEPHALEXIN 500 MG/1
500 CAPSULE ORAL 3 TIMES DAILY
Qty: 15 | Refills: 0 | Status: ACTIVE | COMMUNITY
Start: 2025-03-20 | End: 1900-01-01

## 2025-03-20 RX ORDER — MULTIVIT-MIN/IRON/FOLIC ACID/K 18-600-40
500 CAPSULE ORAL DAILY
Refills: 0 | Status: ACTIVE | COMMUNITY

## 2025-03-21 DIAGNOSIS — R06.02 SHORTNESS OF BREATH: ICD-10-CM

## 2025-03-24 DIAGNOSIS — E87.79 OTHER FLUID OVERLOAD: ICD-10-CM

## 2025-03-24 DIAGNOSIS — E78.5 HYPERLIPIDEMIA, UNSPECIFIED: ICD-10-CM

## 2025-03-24 DIAGNOSIS — J90 PLEURAL EFFUSION, NOT ELSEWHERE CLASSIFIED: ICD-10-CM

## 2025-03-24 DIAGNOSIS — R33.9 RETENTION OF URINE, UNSPECIFIED: ICD-10-CM

## 2025-03-24 DIAGNOSIS — J94.2 HEMOTHORAX: ICD-10-CM

## 2025-03-24 DIAGNOSIS — Z79.4 LONG TERM (CURRENT) USE OF INSULIN: ICD-10-CM

## 2025-03-24 DIAGNOSIS — I25.10 ATHEROSCLEROTIC HEART DISEASE OF NATIVE CORONARY ARTERY WITHOUT ANGINA PECTORIS: ICD-10-CM

## 2025-03-24 DIAGNOSIS — E11.9 TYPE 2 DIABETES MELLITUS WITHOUT COMPLICATIONS: ICD-10-CM

## 2025-03-24 DIAGNOSIS — Z79.82 LONG TERM (CURRENT) USE OF ASPIRIN: ICD-10-CM

## 2025-03-24 DIAGNOSIS — I10 ESSENTIAL (PRIMARY) HYPERTENSION: ICD-10-CM

## 2025-03-24 DIAGNOSIS — J98.11 ATELECTASIS: ICD-10-CM

## 2025-03-24 DIAGNOSIS — D62 ACUTE POSTHEMORRHAGIC ANEMIA: ICD-10-CM

## 2025-03-24 DIAGNOSIS — J18.9 PNEUMONIA, UNSPECIFIED ORGANISM: ICD-10-CM

## 2025-03-24 DIAGNOSIS — J44.9 CHRONIC OBSTRUCTIVE PULMONARY DISEASE, UNSPECIFIED: ICD-10-CM

## 2025-03-25 ENCOUNTER — APPOINTMENT (OUTPATIENT)
Dept: CARDIOTHORACIC SURGERY | Facility: CLINIC | Age: 55
End: 2025-03-25
Payer: COMMERCIAL

## 2025-03-25 VITALS
DIASTOLIC BLOOD PRESSURE: 76 MMHG | RESPIRATION RATE: 16 BRPM | OXYGEN SATURATION: 98 % | HEIGHT: 70 IN | HEART RATE: 98 BPM | BODY MASS INDEX: 24.77 KG/M2 | TEMPERATURE: 98.9 F | WEIGHT: 173 LBS | SYSTOLIC BLOOD PRESSURE: 114 MMHG

## 2025-03-25 DIAGNOSIS — Z95.1 PRESENCE OF AORTOCORONARY BYPASS GRAFT: ICD-10-CM

## 2025-03-25 PROCEDURE — 99212 OFFICE O/P EST SF 10 MIN: CPT | Mod: 25

## 2025-04-01 LAB
ALBUMIN SERPL ELPH-MCNC: 4.2 G/DL
ALP BLD-CCNC: 119 U/L
ALT SERPL-CCNC: 14 U/L
ANION GAP SERPL CALC-SCNC: 18 MMOL/L
AST SERPL-CCNC: 11 U/L
BILIRUB SERPL-MCNC: 0.5 MG/DL
BUN SERPL-MCNC: 12 MG/DL
CALCIUM SERPL-MCNC: 9.7 MG/DL
CHLORIDE SERPL-SCNC: 94 MMOL/L
CO2 SERPL-SCNC: 24 MMOL/L
CREAT SERPL-MCNC: 0.8 MG/DL
EGFRCR SERPLBLD CKD-EPI 2021: 106 ML/MIN/1.73M2
GLUCOSE SERPL-MCNC: 136 MG/DL
HCT VFR BLD CALC: 37.7 %
HGB BLD-MCNC: 12.2 G/DL
MCHC RBC-ENTMCNC: 26.3 PG
MCHC RBC-ENTMCNC: 32.4 G/DL
MCV RBC AUTO: 81.3 FL
PLATELET # BLD AUTO: 509 K/UL
PMV BLD AUTO: 0 /100 WBCS
PMV BLD: 8.7 FL
POTASSIUM SERPL-SCNC: 4.7 MMOL/L
PROT SERPL-MCNC: 8.5 G/DL
RBC # BLD: 4.64 M/UL
RBC # FLD: 15.2 %
SODIUM SERPL-SCNC: 136 MMOL/L
WBC # FLD AUTO: 6.7 K/UL

## 2025-04-10 ENCOUNTER — APPOINTMENT (OUTPATIENT)
Dept: CARDIOLOGY | Facility: CLINIC | Age: 55
End: 2025-04-10
Payer: COMMERCIAL

## 2025-04-10 ENCOUNTER — APPOINTMENT (OUTPATIENT)
Dept: CARDIOTHORACIC SURGERY | Facility: CLINIC | Age: 55
End: 2025-04-10
Payer: COMMERCIAL

## 2025-04-10 VITALS
BODY MASS INDEX: 25.48 KG/M2 | DIASTOLIC BLOOD PRESSURE: 77 MMHG | WEIGHT: 178 LBS | HEART RATE: 90 BPM | HEIGHT: 70 IN | SYSTOLIC BLOOD PRESSURE: 114 MMHG

## 2025-04-10 VITALS
OXYGEN SATURATION: 99 % | HEART RATE: 96 BPM | DIASTOLIC BLOOD PRESSURE: 80 MMHG | SYSTOLIC BLOOD PRESSURE: 119 MMHG | BODY MASS INDEX: 24.91 KG/M2 | TEMPERATURE: 98.6 F | WEIGHT: 174 LBS | RESPIRATION RATE: 16 BRPM | HEIGHT: 70 IN

## 2025-04-10 DIAGNOSIS — Z82.49 FAMILY HISTORY OF ISCHEMIC HEART DISEASE AND OTHER DISEASES OF THE CIRCULATORY SYSTEM: ICD-10-CM

## 2025-04-10 DIAGNOSIS — E11.9 TYPE 2 DIABETES MELLITUS W/OUT COMPLICATIONS: ICD-10-CM

## 2025-04-10 DIAGNOSIS — Z95.1 PRESENCE OF AORTOCORONARY BYPASS GRAFT: ICD-10-CM

## 2025-04-10 DIAGNOSIS — R06.00 DYSPNEA, UNSPECIFIED: ICD-10-CM

## 2025-04-10 DIAGNOSIS — I25.118 ATHEROSCLEROTIC HEART DISEASE OF NATIVE CORONARY ARTERY WITH OTHER FORMS OF ANGINA PECTORIS: ICD-10-CM

## 2025-04-10 DIAGNOSIS — Z86.39 PERSONAL HISTORY OF OTHER ENDOCRINE, NUTRITIONAL AND METABOLIC DISEASE: ICD-10-CM

## 2025-04-10 DIAGNOSIS — E78.1 PURE HYPERGLYCERIDEMIA: ICD-10-CM

## 2025-04-10 PROCEDURE — 99024 POSTOP FOLLOW-UP VISIT: CPT

## 2025-04-10 PROCEDURE — 93000 ELECTROCARDIOGRAM COMPLETE: CPT

## 2025-04-10 PROCEDURE — 99214 OFFICE O/P EST MOD 30 MIN: CPT | Mod: 25

## 2025-04-10 RX ORDER — ASCORBIC ACID 500 MG
500 TABLET ORAL
Refills: 0 | Status: ACTIVE | COMMUNITY

## 2025-04-10 RX ORDER — ICOSAPENT ETHYL 1 G/1
1 CAPSULE ORAL
Refills: 0 | Status: ACTIVE | COMMUNITY

## 2025-04-15 ENCOUNTER — TRANSCRIPTION ENCOUNTER (OUTPATIENT)
Age: 55
End: 2025-04-15

## 2025-04-15 RX ORDER — MULTIVIT WITH MIN/ALA/HERBS 50 MG
TABLET ORAL
Refills: 0 | Status: ACTIVE | COMMUNITY

## 2025-04-16 RX ORDER — LOSARTAN POTASSIUM 25 MG/1
25 TABLET, FILM COATED ORAL DAILY
Qty: 90 | Refills: 3 | Status: ACTIVE | COMMUNITY
Start: 1900-01-01 | End: 1900-01-01

## 2025-05-13 ENCOUNTER — RX RENEWAL (OUTPATIENT)
Age: 55
End: 2025-05-13

## 2025-05-13 RX ORDER — METOPROLOL TARTRATE 25 MG/1
25 TABLET ORAL
Qty: 60 | Refills: 3 | Status: ACTIVE | COMMUNITY
Start: 2025-05-13 | End: 1900-01-01

## 2025-05-29 ENCOUNTER — RESULT CHARGE (OUTPATIENT)
Age: 55
End: 2025-05-29

## 2025-05-29 ENCOUNTER — APPOINTMENT (OUTPATIENT)
Dept: CARDIOLOGY | Facility: CLINIC | Age: 55
End: 2025-05-29
Payer: COMMERCIAL

## 2025-05-29 VITALS
WEIGHT: 179 LBS | DIASTOLIC BLOOD PRESSURE: 84 MMHG | BODY MASS INDEX: 25.62 KG/M2 | HEIGHT: 70 IN | SYSTOLIC BLOOD PRESSURE: 128 MMHG

## 2025-05-29 DIAGNOSIS — I25.118 ATHEROSCLEROTIC HEART DISEASE OF NATIVE CORONARY ARTERY WITH OTHER FORMS OF ANGINA PECTORIS: ICD-10-CM

## 2025-05-29 DIAGNOSIS — Z86.39 PERSONAL HISTORY OF OTHER ENDOCRINE, NUTRITIONAL AND METABOLIC DISEASE: ICD-10-CM

## 2025-05-29 DIAGNOSIS — Z95.1 PRESENCE OF AORTOCORONARY BYPASS GRAFT: ICD-10-CM

## 2025-05-29 DIAGNOSIS — E11.9 TYPE 2 DIABETES MELLITUS W/OUT COMPLICATIONS: ICD-10-CM

## 2025-05-29 DIAGNOSIS — E78.1 PURE HYPERGLYCERIDEMIA: ICD-10-CM

## 2025-05-29 PROCEDURE — 99214 OFFICE O/P EST MOD 30 MIN: CPT | Mod: 25

## 2025-05-29 PROCEDURE — 93000 ELECTROCARDIOGRAM COMPLETE: CPT

## 2025-05-29 RX ORDER — MULTIVIT WITH MIN/ALA/HERBS 50 MG
TABLET ORAL
Refills: 0 | Status: ACTIVE | COMMUNITY

## 2025-06-05 RX ORDER — ALIROCUMAB 75 MG/ML
75 INJECTION, SOLUTION SUBCUTANEOUS
Qty: 3 | Refills: 5 | Status: ACTIVE | COMMUNITY
Start: 2025-05-29 | End: 1900-01-01

## 2025-09-04 ENCOUNTER — APPOINTMENT (OUTPATIENT)
Dept: CARDIOLOGY | Facility: CLINIC | Age: 55
End: 2025-09-04
Payer: COMMERCIAL

## 2025-09-04 VITALS
DIASTOLIC BLOOD PRESSURE: 77 MMHG | WEIGHT: 183 LBS | HEART RATE: 91 BPM | HEIGHT: 70 IN | BODY MASS INDEX: 26.2 KG/M2 | SYSTOLIC BLOOD PRESSURE: 127 MMHG

## 2025-09-04 DIAGNOSIS — E11.9 TYPE 2 DIABETES MELLITUS W/OUT COMPLICATIONS: ICD-10-CM

## 2025-09-04 DIAGNOSIS — I25.118 ATHEROSCLEROTIC HEART DISEASE OF NATIVE CORONARY ARTERY WITH OTHER FORMS OF ANGINA PECTORIS: ICD-10-CM

## 2025-09-04 DIAGNOSIS — Z86.39 PERSONAL HISTORY OF OTHER ENDOCRINE, NUTRITIONAL AND METABOLIC DISEASE: ICD-10-CM

## 2025-09-04 DIAGNOSIS — E78.1 PURE HYPERGLYCERIDEMIA: ICD-10-CM

## 2025-09-04 DIAGNOSIS — Z95.1 PRESENCE OF AORTOCORONARY BYPASS GRAFT: ICD-10-CM

## 2025-09-04 PROCEDURE — 99214 OFFICE O/P EST MOD 30 MIN: CPT | Mod: 25

## 2025-09-04 PROCEDURE — 93000 ELECTROCARDIOGRAM COMPLETE: CPT
